# Patient Record
Sex: MALE | Race: WHITE | NOT HISPANIC OR LATINO | Employment: OTHER | URBAN - METROPOLITAN AREA
[De-identification: names, ages, dates, MRNs, and addresses within clinical notes are randomized per-mention and may not be internally consistent; named-entity substitution may affect disease eponyms.]

---

## 2018-02-15 ENCOUNTER — OFFICE VISIT (OUTPATIENT)
Dept: UROLOGY | Facility: CLINIC | Age: 38
End: 2018-02-15
Payer: MEDICARE

## 2018-02-15 VITALS
WEIGHT: 170.19 LBS | BODY MASS INDEX: 25.79 KG/M2 | DIASTOLIC BLOOD PRESSURE: 75 MMHG | SYSTOLIC BLOOD PRESSURE: 109 MMHG | HEIGHT: 68 IN | HEART RATE: 91 BPM

## 2018-02-15 DIAGNOSIS — R39.89 BLADDER PAIN: Primary | ICD-10-CM

## 2018-02-15 DIAGNOSIS — R30.0 DYSURIA: ICD-10-CM

## 2018-02-15 LAB
BILIRUB SERPL-MCNC: ABNORMAL MG/DL
BLOOD URINE, POC: ABNORMAL
COLOR, POC UA: ABNORMAL
GLUCOSE UR QL STRIP: 500
KETONES UR QL STRIP: 15
LEUKOCYTE ESTERASE URINE, POC: ABNORMAL
NITRITE, POC UA: ABNORMAL
PH, POC UA: 5
PROTEIN, POC: 300
SPECIFIC GRAVITY, POC UA: 1
UROBILINOGEN, POC UA: 8

## 2018-02-15 PROCEDURE — 99204 OFFICE O/P NEW MOD 45 MIN: CPT | Mod: S$PBB,,, | Performed by: UROLOGY

## 2018-02-15 PROCEDURE — 87086 URINE CULTURE/COLONY COUNT: CPT

## 2018-02-15 PROCEDURE — 99214 OFFICE O/P EST MOD 30 MIN: CPT | Mod: PBBFAC,PO | Performed by: UROLOGY

## 2018-02-15 PROCEDURE — 81002 URINALYSIS NONAUTO W/O SCOPE: CPT | Mod: PBBFAC,PO | Performed by: UROLOGY

## 2018-02-15 PROCEDURE — 99999 PR PBB SHADOW E&M-EST. PATIENT-LVL IV: CPT | Mod: PBBFAC,,, | Performed by: UROLOGY

## 2018-02-15 RX ORDER — OLANZAPINE 15 MG/1
15 TABLET ORAL NIGHTLY
Status: ON HOLD | COMMUNITY
Start: 2018-01-30 | End: 2018-12-12

## 2018-02-15 RX ORDER — CYCLOBENZAPRINE HCL 10 MG
10 TABLET ORAL 2 TIMES DAILY
COMMUNITY
Start: 2017-12-02 | End: 2020-08-01 | Stop reason: CLARIF

## 2018-02-15 RX ORDER — GABAPENTIN 600 MG/1
900 TABLET ORAL 3 TIMES DAILY
Status: ON HOLD | COMMUNITY
Start: 2015-06-30 | End: 2018-12-12 | Stop reason: CLARIF

## 2018-02-15 RX ORDER — DOCUSATE SODIUM AND SENNOSIDES 8.6; 5 MG/1; MG/1
TABLET ORAL
Refills: 0 | Status: ON HOLD | COMMUNITY
Start: 2018-01-30 | End: 2018-12-12

## 2018-02-15 RX ORDER — DOXYCYCLINE HYCLATE 100 MG
TABLET ORAL
COMMUNITY
Start: 2018-02-06 | End: 2018-03-02

## 2018-02-15 RX ORDER — OXCARBAZEPINE 600 MG/1
300 TABLET, FILM COATED ORAL 2 TIMES DAILY
Status: ON HOLD | COMMUNITY
Start: 2018-01-30 | End: 2018-12-16 | Stop reason: SDUPTHER

## 2018-02-15 RX ORDER — SERTRALINE HYDROCHLORIDE 100 MG/1
100 TABLET, FILM COATED ORAL NIGHTLY
Status: ON HOLD | COMMUNITY
Start: 2018-01-30 | End: 2018-12-12

## 2018-02-15 RX ORDER — IBUPROFEN 400 MG/1
400 TABLET ORAL 2 TIMES DAILY PRN
Status: ON HOLD | COMMUNITY
Start: 2018-01-30 | End: 2018-12-12 | Stop reason: CLARIF

## 2018-02-15 RX ORDER — ACYCLOVIR 400 MG/1
400 TABLET ORAL 3 TIMES DAILY
Status: ON HOLD | COMMUNITY
Start: 2018-01-30 | End: 2018-12-16 | Stop reason: HOSPADM

## 2018-02-15 RX ORDER — PROPRANOLOL HYDROCHLORIDE 10 MG/1
10 TABLET ORAL DAILY
Status: ON HOLD | COMMUNITY
Start: 2018-01-30 | End: 2018-12-12

## 2018-02-15 RX ORDER — NAPROXEN 500 MG/1
500 TABLET ORAL DAILY
Status: ON HOLD | COMMUNITY
Start: 2018-01-29 | End: 2018-12-12

## 2018-02-15 RX ORDER — AMITRIPTYLINE HYDROCHLORIDE 50 MG/1
100 TABLET, FILM COATED ORAL NIGHTLY PRN
Status: ON HOLD | COMMUNITY
End: 2018-12-12 | Stop reason: CLARIF

## 2018-02-15 RX ORDER — MULTIVIT-MINS/IRON/FOLIC/LYCOP 8-200-600
TABLET ORAL
Refills: 0 | Status: ON HOLD | COMMUNITY
Start: 2018-01-30 | End: 2018-12-12 | Stop reason: CLARIF

## 2018-02-15 RX ORDER — BUPROPION HYDROCHLORIDE 300 MG/1
450 TABLET ORAL DAILY
Status: ON HOLD | COMMUNITY
Start: 2018-01-30 | End: 2018-12-16 | Stop reason: SDUPTHER

## 2018-02-15 RX ORDER — IRON,CARB/VIT C/VIT B12/FOLIC 100-250-1
TABLET ORAL
Refills: 0 | Status: ON HOLD | COMMUNITY
Start: 2018-01-31 | End: 2018-12-12 | Stop reason: CLARIF

## 2018-02-15 RX ORDER — DULOXETIN HYDROCHLORIDE 30 MG/1
30 CAPSULE, DELAYED RELEASE ORAL DAILY
Status: ON HOLD | COMMUNITY
Start: 2018-01-30 | End: 2018-12-12

## 2018-02-15 NOTE — PROGRESS NOTES
"Subjective:       Patient ID: Ashwin Madrid is a 37 y.o. male.    Chief Complaint: Advice Only (bladder pain)    HPI     37 year old with "bladder pain"  This has been an ongoing problem  He was previously treated for an infection with a course of Cipro.  He was seeing a a Urologist in [place] but has relocated.  Cystoscopy apparently was normal.  He says there is pain at the base of his penis and radiates through the length of the penis.  He was seen in the ER and is now taking Doxycyline.  Seen in ER [facility].  He also complains of painful urination.  He is taking AZO which he feels is helpful.  He denies gross hematuria.   GC and chlamydia cultures are neg.  AUA symptom score is 13.     UA on AZO,  Urine  Micro exam: 0-2 RBC's per HPF.      Past Medical History:   Diagnosis Date    ADHD (attention deficit hyperactivity disorder) 2/7/2012    Anxiety     Bipolar 1 disorder 2/7/2012    Depression      History reviewed. No pertinent surgical history.      Current Outpatient Prescriptions:     acyclovir (ZOVIRAX) 400 MG tablet, , Disp: , Rfl:     amitriptyline (ELAVIL) 50 MG tablet, Take 50 mg by mouth., Disp: , Rfl:     buPROPion (WELLBUTRIN XL) 300 MG 24 hr tablet, , Disp: , Rfl:     CENTRUM MEN 8 mg iron- 200 mcg-600 mcg Tab, TK 1 T PO D, Disp: , Rfl: 0    cyclobenzaprine (FLEXERIL) 10 MG tablet, , Disp: , Rfl:     doxycycline (VIBRA-TABS) 100 MG tablet, , Disp: , Rfl:     DULoxetine (CYMBALTA) 30 MG capsule, , Disp: , Rfl:     FE C PLUS Tab, TK 1 T PO  D, Disp: , Rfl: 0    gabapentin (NEURONTIN) 600 MG tablet, Take by mouth., Disp: , Rfl:     ibuprofen (ADVIL,MOTRIN) 400 MG tablet, , Disp: , Rfl:     naproxen (NAPROSYN) 500 MG tablet, , Disp: , Rfl:     OLANZapine (ZYPREXA) 15 MG Tab, , Disp: , Rfl:     OXcarbazepine (TRILEPTAL) 600 MG Tab, , Disp: , Rfl:     propranolol (INDERAL) 10 MG tablet, , Disp: , Rfl:     SENNA-S 8.6-50 mg per tablet, TK 1 T PO QD, Disp: , Rfl: 0    sertraline " (ZOLOFT) 100 MG tablet, , Disp: , Rfl:       Review of Systems   Constitutional: Negative for fever.   Eyes: Negative for visual disturbance.   Respiratory: Negative for shortness of breath.    Cardiovascular: Negative for chest pain.   Gastrointestinal: Negative for nausea.   Genitourinary: Positive for dysuria. Negative for hematuria.   Musculoskeletal: Negative for gait problem.   Skin: Negative for rash.   Neurological: Negative for seizures.   Psychiatric/Behavioral: Negative for confusion.       Objective:      Physical Exam   Constitutional: He is oriented to person, place, and time. He appears well-developed and well-nourished.   HENT:   Head: Normocephalic and atraumatic.   Eyes: Conjunctivae are normal.   Cardiovascular: Normal rate.    Pulmonary/Chest: Effort normal.   Abdominal: Hernia confirmed negative in the right inguinal area and confirmed negative in the left inguinal area.   Genitourinary: Testes normal and penis normal.         Musculoskeletal: Normal range of motion.   Neurological: He is alert and oriented to person, place, and time.   Skin: Skin is warm and dry. No rash noted.   Psychiatric: He has a normal mood and affect.   Vitals reviewed.      Assessment:       1. Bladder pain    2. Dysuria        Plan:       Bladder pain  -     POCT URINE DIPSTICK WITHOUT MICROSCOPE  -     US Retroperitoneal Complete (Kidney and; Future; Expected date: 02/15/2018    Dysuria  -     Urine culture      Plan Renal US.  Continue Doxycyline.  F/u urine culture.

## 2018-02-17 LAB — BACTERIA UR CULT: NO GROWTH

## 2018-02-19 ENCOUNTER — HOSPITAL ENCOUNTER (OUTPATIENT)
Dept: RADIOLOGY | Facility: HOSPITAL | Age: 38
Discharge: HOME | End: 2018-02-19
Attending: UROLOGY
Payer: MEDICARE

## 2018-02-19 DIAGNOSIS — R39.89 BLADDER PAIN: ICD-10-CM

## 2018-02-19 PROCEDURE — 76770 US EXAM ABDO BACK WALL COMP: CPT | Mod: 26,,, | Performed by: RADIOLOGY

## 2018-02-19 PROCEDURE — 76770 US EXAM ABDO BACK WALL COMP: CPT | Mod: TC,PO

## 2018-03-02 ENCOUNTER — OFFICE VISIT (OUTPATIENT)
Dept: UROLOGY | Facility: CLINIC | Age: 38
End: 2018-03-02
Payer: MEDICARE

## 2018-03-02 VITALS
BODY MASS INDEX: 24.89 KG/M2 | DIASTOLIC BLOOD PRESSURE: 69 MMHG | HEIGHT: 68 IN | HEART RATE: 80 BPM | SYSTOLIC BLOOD PRESSURE: 113 MMHG | WEIGHT: 164.19 LBS

## 2018-03-02 DIAGNOSIS — R39.89 BLADDER PAIN: Primary | ICD-10-CM

## 2018-03-02 DIAGNOSIS — R30.0 DYSURIA: ICD-10-CM

## 2018-03-02 LAB
BILIRUB SERPL-MCNC: NORMAL MG/DL
BLOOD URINE, POC: NORMAL
COLOR, POC UA: NORMAL
GLUCOSE UR QL STRIP: NORMAL
KETONES UR QL STRIP: NORMAL
LEUKOCYTE ESTERASE URINE, POC: NORMAL
NITRITE, POC UA: POSITIVE
PH, POC UA: 6
PROTEIN, POC: NORMAL
SPECIFIC GRAVITY, POC UA: 1.02
UROBILINOGEN, POC UA: 0.2

## 2018-03-02 PROCEDURE — 81002 URINALYSIS NONAUTO W/O SCOPE: CPT | Mod: PBBFAC,PO | Performed by: UROLOGY

## 2018-03-02 PROCEDURE — 99999 PR PBB SHADOW E&M-EST. PATIENT-LVL III: CPT | Mod: PBBFAC,,, | Performed by: UROLOGY

## 2018-03-02 PROCEDURE — 99213 OFFICE O/P EST LOW 20 MIN: CPT | Mod: S$PBB,,, | Performed by: UROLOGY

## 2018-03-02 PROCEDURE — 99213 OFFICE O/P EST LOW 20 MIN: CPT | Mod: PBBFAC,PO | Performed by: UROLOGY

## 2018-03-02 NOTE — PROGRESS NOTES
Subjective:       Patient ID: Ashwin Madrid is a 37 y.o. male.    Chief Complaint: Bladder Pain    HPI     He completed the antibiotics and still has persistent pain which is constant.  The pain is worse in the morning.  Sometime taking a bath relieves the pain briefly.  More painful when he voids.  No change with cough and sneeze.  Urine culture was negative.  Renal US noted a right renal cyst with septation.  UA today reviewed.  Nitrate positive but he has been taking AZO.  Micro noted crystals and micro blood.    Review of Systems   Constitutional: Negative for fever.   Genitourinary: Positive for dysuria. Negative for hematuria.       Objective:      Physical Exam   Constitutional: He is oriented to person, place, and time. He appears well-developed and well-nourished.   HENT:   Head: Normocephalic and atraumatic.   Eyes: Conjunctivae are normal.   Cardiovascular: Normal rate.    Pulmonary/Chest: Effort normal.   Abdominal: Hernia confirmed negative in the right inguinal area and confirmed negative in the left inguinal area.   Genitourinary: Testes normal and penis normal. Rectal exam shows no mass and anal tone normal. Prostate is enlarged. Prostate is not tender.   Musculoskeletal: Normal range of motion.   Neurological: He is alert and oriented to person, place, and time.   Skin: Skin is warm and dry. No rash noted.   Psychiatric: He has a normal mood and affect.   Vitals reviewed.      Assessment:       1. Bladder pain    2. Dysuria        Plan:       Bladder pain  -     POCT URINE DIPSTICK WITHOUT MICROSCOPE  -     Cystoscopy; Future    Dysuria      f/u for cystoscopy.

## 2018-11-14 ENCOUNTER — HOSPITAL ENCOUNTER (EMERGENCY)
Facility: HOSPITAL | Age: 38
Discharge: HOME | End: 2018-11-14
Attending: EMERGENCY MEDICINE
Payer: MEDICARE

## 2018-11-14 VITALS
RESPIRATION RATE: 18 BRPM | DIASTOLIC BLOOD PRESSURE: 90 MMHG | HEART RATE: 95 BPM | OXYGEN SATURATION: 99 % | SYSTOLIC BLOOD PRESSURE: 130 MMHG | TEMPERATURE: 99 F

## 2018-11-14 DIAGNOSIS — N50.819 TESTICULAR PAIN: Primary | ICD-10-CM

## 2018-11-14 DIAGNOSIS — Z87.440 HISTORY OF UTI: ICD-10-CM

## 2018-11-14 LAB
BILIRUB UR QL STRIP: NEGATIVE
BILIRUB UR QL STRIP: NEGATIVE
CLARITY UR: CLEAR
CLARITY UR: CLEAR
COLOR UR: YELLOW
COLOR UR: YELLOW
GLUCOSE UR QL STRIP: NEGATIVE
GLUCOSE UR QL STRIP: NEGATIVE
HGB UR QL STRIP: NEGATIVE
HGB UR QL STRIP: NEGATIVE
KETONES UR QL STRIP: ABNORMAL
KETONES UR QL STRIP: ABNORMAL
LEUKOCYTE ESTERASE UR QL STRIP: NEGATIVE
LEUKOCYTE ESTERASE UR QL STRIP: NEGATIVE
NITRITE UR QL STRIP: NEGATIVE
NITRITE UR QL STRIP: NEGATIVE
PH UR STRIP: 7 [PH] (ref 5–8)
PH UR STRIP: 7 [PH] (ref 5–8)
PROT UR QL STRIP: NEGATIVE
PROT UR QL STRIP: NEGATIVE
SP GR UR STRIP: 1.01 (ref 1–1.03)
SP GR UR STRIP: 1.01 (ref 1–1.03)
URN SPEC COLLECT METH UR: ABNORMAL
URN SPEC COLLECT METH UR: ABNORMAL
UROBILINOGEN UR STRIP-ACNC: NEGATIVE EU/DL
UROBILINOGEN UR STRIP-ACNC: NEGATIVE EU/DL

## 2018-11-14 PROCEDURE — 81003 URINALYSIS AUTO W/O SCOPE: CPT

## 2018-11-14 PROCEDURE — 99284 EMERGENCY DEPT VISIT MOD MDM: CPT | Mod: 25

## 2018-11-14 RX ORDER — IBUPROFEN 600 MG/1
600 TABLET ORAL EVERY 6 HOURS PRN
Qty: 20 TABLET | Refills: 0 | Status: ON HOLD | OUTPATIENT
Start: 2018-11-14 | End: 2018-12-12

## 2018-11-14 RX ORDER — HYDROCODONE BITARTRATE AND ACETAMINOPHEN 10; 325 MG/1; MG/1
1 TABLET ORAL
Status: DISCONTINUED | OUTPATIENT
Start: 2018-11-14 | End: 2018-11-14

## 2018-11-14 NOTE — ED PROVIDER NOTES
SCRIBE #1 NOTE: I, Solange Wright, am scribing for, and in the presence of, Luke Harper NP . I have scribed the entire note.       History     Chief Complaint   Patient presents with    Testicle Pain     recently seen for same. [place] x3h. Received 80mcg fentanyl en route     Review of patient's allergies indicates:  No Known Allergies      History of Present Illness     HPI    11/14/2018, 5:47 PM  History obtained from the patient      History of Present Illness: Ashwin Madrid is a 38 y.o. male patient with a PMHx including anxiety who presents to the Emergency Department for evaluation of testicular pain which onset suddenly today. Pt states his testicle went up to his stomach. Symptoms are constant and moderate in severity. Pt was worked up at [facility] on 11/11/18 with negative chlamydia test, nitrite positive urine, and elevated WBC count of 148[address]. Pt's CT showed nonspecific mild thickening of the wall of the urinary bladder, raising the possibility of mild cystitis and herniation of a small amount fat along the right inguinal canal.  Pt states he did not get any prescriptions filled.  No mitigating or exacerbating factors reported. Associated sxs include nausea and emesis. Patient denies any dysuria, hematuria, penile pain/discharge, scrotal swelling, abd pain, diarrhea, fever, chills, and all other sxs at this time. Prior Tx includes 80 mcg fentanyl en route via EMS. No further complaints or concerns at this time.         Arrival mode:  EMS      PCP: Primary Doctor No        Past Medical History:  Past Medical History:   Diagnosis Date    ADHD (attention deficit hyperactivity disorder) 2/7/2012    Anxiety     Bipolar 1 disorder 2/7/2012    Depression        Past Surgical History:  Past Surgical History:   Procedure Laterality Date    EGD (ESOPHAGOGASTRODUODENOSCOPY) N/A 9/14/2012    Performed by Juancarlos So Jr., MD at [facility]         Family History:  Family History   Problem Relation Age of Onset     ADD / ADHD Neg Hx     Alcohol abuse Neg Hx     Anxiety disorder Neg Hx     Bipolar disorder Neg Hx     Dementia Neg Hx     Depression Neg Hx     Drug abuse Neg Hx     OCD Neg Hx     Paranoid behavior Neg Hx     Physical abuse Neg Hx     Schizophrenia Neg Hx     Seizures Neg Hx     Self injury Neg Hx     Sexual abuse Neg Hx     Suicide Neg Hx        Social History:  Social History     Tobacco Use    Smoking status: Current Every Day Smoker     Packs/day: 1.00     Years: 10.00     Pack years: 10.00     Types: Cigarettes    Smokeless tobacco: Never Used   Substance and Sexual Activity    Alcohol use: No    Drug use: No     Comment: history of MJ at age 17, pain pills, mushrooms, Sober for last 1.5 years    Sexual activity: Yes     Partners: Female        Review of Systems     Review of Systems   Constitutional: Negative for chills and fever.   HENT: Negative for congestion and sore throat.    Respiratory: Negative for cough and shortness of breath.    Cardiovascular: Negative for chest pain and leg swelling.   Gastrointestinal: Positive for nausea and vomiting. Negative for abdominal pain, blood in stool, constipation and diarrhea.   Genitourinary: Positive for testicular pain. Negative for discharge, dysuria, flank pain, hematuria, penile pain, penile swelling and scrotal swelling.   Musculoskeletal: Negative for back pain and myalgias.   Skin: Negative for rash and wound.   Neurological: Negative for dizziness, light-headedness and headaches.   Psychiatric/Behavioral: Negative for agitation and confusion.   All other systems reviewed and are negative.       Physical Exam     Initial Vitals [11/14/18 1631]   BP Pulse Resp Temp SpO2   (!) 130/90 95 18 98.8 °F (37.1 °C) 99 %      MAP       --          Physical Exam  Nursing Notes and Vital Signs Reviewed.  Constitutional: Patient is in no apparent distress. Well-developed and well-nourished.  Head: Atraumatic. Normocephalic.  Eyes: PERRL. EOM  intact. Conjunctivae are not pale. No scleral icterus.  ENT: Mucous membranes are moist. Oropharynx is clear and symmetric.    Neck: Supple. Full ROM. No lymphadenopathy.  Cardiovascular: Regular rate. Regular rhythm. No murmurs, rubs, or gallops. Distal pulses are 2+ and symmetric.  Pulmonary/Chest: No respiratory distress. Clear to auscultation bilaterally. No wheezing or rales.  Abdominal: Soft and non-distended.  There is no tenderness.  No rebound, guarding, or rigidity. Good bowel sounds.  Genitourinary: No CVA tenderness  : External inspection is normal.   No erythema, rash, or lesions. No penile discharge. Normal bilateral testicular lie and position. Scrotum and testes appear normal with no discoloration. No scrotal, testicular, or epididymal tenderness. No masses or hernias around the scrotum, testicles, or inguinal canal.  Musculoskeletal: Moves all extremities. No obvious deformities. No edema. No calf tenderness.  Skin: Warm and dry.  Neurological:  Alert, awake, and appropriate.  Normal speech.  No acute focal neurological deficits are appreciated.  Psychiatric: Normal affect. Good eye contact. Appropriate in content.     ED Course   Procedures  ED Vital Signs:  Vitals:    11/14/18 1631   BP: (!) 130/90   Pulse: 95   Resp: 18   Temp: 98.8 °F (37.1 °C)   TempSrc: Oral   SpO2: 99%       Abnormal Lab Results:  Labs Reviewed   URINALYSIS - Abnormal; Notable for the following components:       Result Value    Ketones, UA 1+ (*)     All other components within normal limits    Narrative:     Urine Macroscopic was cancelled on 11/14/2018 at 17:14 by SEG;   absorbed by other test UAR   URINALYSIS, REFLEX TO URINE CULTURE - Abnormal; Notable for the following components:    Ketones, UA 1+ (*)     All other components within normal limits   URINALYSIS, REFLEX TO URINE CULTURE        All Lab Results:  Results for orders placed or performed during the hospital encounter of 11/14/18   Urinalysis   Result Value  Ref Range    Specimen UA Urine, Clean Catch     Color, UA Yellow Yellow, Straw, Aniya    Appearance, UA Clear Clear    pH, UA 7.0 5.0 - 8.0    Specific Gravity, UA 1.010 1.005 - 1.030    Protein, UA Negative Negative    Glucose, UA Negative Negative    Ketones, UA 1+ (A) Negative    Bilirubin (UA) Negative Negative    Occult Blood UA Negative Negative    Nitrite, UA Negative Negative    Urobilinogen, UA Negative <2.0 EU/dL    Leukocytes, UA Negative Negative   Urinalysis, Reflex to Urine Culture   Result Value Ref Range    Specimen UA Urine, Clean Catch     Color, UA Yellow Yellow, Straw, Aniya    Appearance, UA Clear Clear    pH, UA 7.0 5.0 - 8.0    Specific Gravity, UA 1.010 1.005 - 1.030    Protein, UA Negative Negative    Glucose, UA Negative Negative    Ketones, UA 1+ (A) Negative    Bilirubin (UA) Negative Negative    Occult Blood UA Negative Negative    Nitrite, UA Negative Negative    Urobilinogen, UA Negative <2.0 EU/dL    Leukocytes, UA Negative Negative         Imaging Results:  Imaging Results          US Scrotum And Testicles (Final result)  Result time 11/14/18 18:46:43    Final result by Winston Dillard MD (11/14/18 18:46:43)                 Impression:      Normal testicular ultrasound.  No evidence for testicular torsion or mass.      Electronically signed by: Winston Dillard MD  Date:    11/14/2018  Time:    18:46             Narrative:    EXAMINATION:  US SCROTUM AND TESTICLES    CLINICAL HISTORY:  Testicular pain, unspecified    TECHNIQUE:  Sonography of the scrotum and testes.    COMPARISON:  None.    FINDINGS:  Right Testicle:    Size: 4.2 x 3.0 x 2.4 cm    Appearance: Normal    Flow: Normal arterial and venous flow    Epididymis: Normal    Hydrocele: None    Varicocele: None    Left Testicle:    Size: 4.6 x 2.6 x 2.5 cm    Appearance: Normal    Flow: Normal arterial and venous flow    Epididymis: Normal    Hydrocele: None    Varicocele: None                                      The Emergency  Provider reviewed the vital signs and test results, which are outlined above.     ED Discussion     6:54 PM: Discussed with pt all pertinent ED information and results. Discussed pt dx of and plan of tx. Advised pt to f/u with PCP in one week. Gave pt all f/u and return to the ED instructions. All questions and concerns were addressed at this time. Pt expresses understanding of information and instructions, and is comfortable with plan to discharge. Pt is stable for discharge.      I discussed with patient and/or family/caretaker that evaluation in the ED does not suggest any emergent or life threatening medical conditions requiring immediate intervention beyond what was provided in the ED, and I believe patient is safe for discharge.  Regardless, an unremarkable evaluation in the ED does not preclude the development or presence of a serious of life threatening condition. As such, patient was instructed to return immediately for any worsening or change in current symptoms.      ED Medication(s):  Medications   HYDROcodone-acetaminophen  mg per tablet 1 tablet (not administered)              Current Discharge Medication List      START taking these medications    Details   !! ibuprofen (ADVIL,MOTRIN) 600 MG tablet Take 1 tablet (600 mg total) by mouth every 6 (six) hours as needed for Pain.  Qty: 20 tablet, Refills: 0                  Follow-up Information     Primary doctor In 1 week.                        Medical Decision Making     Medical Decision Making:   Clinical Tests:   Lab Tests: Ordered and Reviewed  Radiological Study: Ordered and Reviewed             Scribe Attestation:   Scribe #1: I performed the above scribed service and the documentation accurately describes the services I performed. I attest to the accuracy of the note. 11/14/2018 5:47 PM    Attending:   Physician Attestation Statement for Scribe #1: I, Luke Harper NP, personally performed the services described in this documentation,  as scribed by Solange Wright, in my presence, and it is both accurate and complete.           Clinical Impression       ICD-10-CM ICD-9-CM   1. Testicular pain N50.819 608.9   2. History of UTI Z87.440 V13.02       Disposition:   Disposition: Discharged  Condition: Stable               Luke Harper Jr., [facility]  11/14/18 1958

## 2018-11-15 NOTE — ED NOTES
"Pt came by ambulance and states he will get a cab voucher to go home. Pt also requested a sandwich. Informed pt of NPO status. Luke MENG made aware of pt having "no ride home" Told to hold the hydrocodone  "

## 2018-12-08 PROBLEM — J18.9 PNEUMONIA: Status: ACTIVE | Noted: 2018-12-08

## 2018-12-09 ENCOUNTER — HOSPITAL ENCOUNTER (INPATIENT)
Facility: HOSPITAL | Age: 38
LOS: 7 days | Discharge: HOME | DRG: 208 | End: 2018-12-16
Attending: INTERNAL MEDICINE | Admitting: INTERNAL MEDICINE
Payer: MEDICARE

## 2018-12-09 ENCOUNTER — HOSPITAL ENCOUNTER (OUTPATIENT)
Dept: RADIOLOGY | Facility: HOSPITAL | Age: 38
Discharge: HOME | DRG: 208 | End: 2018-12-09
Attending: INTERNAL MEDICINE
Payer: MEDICARE

## 2018-12-09 DIAGNOSIS — K75.9 HEPATITIS: ICD-10-CM

## 2018-12-09 DIAGNOSIS — J80 ARDS (ADULT RESPIRATORY DISTRESS SYNDROME): ICD-10-CM

## 2018-12-09 DIAGNOSIS — J18.9 PNEUMONIA: ICD-10-CM

## 2018-12-09 DIAGNOSIS — R94.31 QT PROLONGATION: ICD-10-CM

## 2018-12-09 PROBLEM — E87.20 METABOLIC ACIDOSIS: Status: ACTIVE | Noted: 2018-12-09

## 2018-12-09 PROBLEM — F51.01 PRIMARY INSOMNIA: Status: ACTIVE | Noted: 2018-12-09

## 2018-12-09 PROBLEM — R74.01 TRANSAMINITIS: Status: ACTIVE | Noted: 2018-12-09

## 2018-12-09 PROBLEM — E78.1 HYPERTRIGLYCERIDEMIA: Status: ACTIVE | Noted: 2018-12-09

## 2018-12-09 PROBLEM — E87.29 RESPIRATORY ACIDOSIS: Status: ACTIVE | Noted: 2018-12-09

## 2018-12-09 PROBLEM — G44.009 CLUSTER HEADACHES: Status: ACTIVE | Noted: 2018-12-09

## 2018-12-09 PROBLEM — F41.9 ANXIETY: Status: ACTIVE | Noted: 2018-12-09

## 2018-12-09 LAB
ALBUMIN SERPL BCP-MCNC: 2.2 G/DL
ALLENS TEST: ABNORMAL
ALP SERPL-CCNC: 64 U/L
ALT SERPL W/O P-5'-P-CCNC: 59 U/L
ANION GAP SERPL CALC-SCNC: 9 MMOL/L
AST SERPL-CCNC: 159 U/L
BASOPHILS # BLD AUTO: 0.01 K/UL
BASOPHILS NFR BLD: 0.1 %
BILIRUB SERPL-MCNC: 0.3 MG/DL
BUN SERPL-MCNC: 14 MG/DL
CALCIUM SERPL-MCNC: 7.9 MG/DL
CHLORIDE SERPL-SCNC: 107 MMOL/L
CHOLEST SERPL-MCNC: 127 MG/DL
CHOLEST/HDLC SERPL: 9.1 {RATIO}
CO2 SERPL-SCNC: 21 MMOL/L
CREAT SERPL-MCNC: 0.7 MG/DL
DELSYS: ABNORMAL
DIFFERENTIAL METHOD: ABNORMAL
EOSINOPHIL # BLD AUTO: 0 K/UL
EOSINOPHIL NFR BLD: 0.1 %
ERYTHROCYTE [DISTWIDTH] IN BLOOD BY AUTOMATED COUNT: 12.7 %
ERYTHROCYTE [SEDIMENTATION RATE] IN BLOOD BY WESTERGREN METHOD: 30 MM/H
ERYTHROCYTE [SEDIMENTATION RATE] IN BLOOD BY WESTERGREN METHOD: 32 MM/H
EST. GFR  (AFRICAN AMERICAN): >60 ML/MIN/1.73 M^2
EST. GFR  (NON AFRICAN AMERICAN): >60 ML/MIN/1.73 M^2
ESTIMATED AVG GLUCOSE: 80 MG/DL
ETHANOL SERPL-MCNC: <10 MG/DL
FIO2: 100
FIO2: 35
FIO2: 40
FIO2: 45
GLUCOSE SERPL-MCNC: 141 MG/DL
HBA1C MFR BLD HPLC: 4.4 %
HCO3 UR-SCNC: 23 MMOL/L (ref 24–28)
HCO3 UR-SCNC: 24.7 MMOL/L (ref 24–28)
HCO3 UR-SCNC: 24.9 MMOL/L (ref 24–28)
HCO3 UR-SCNC: 27.1 MMOL/L (ref 24–28)
HCO3 UR-SCNC: 27.7 MMOL/L (ref 24–28)
HCO3 UR-SCNC: 28.1 MMOL/L (ref 24–28)
HCT VFR BLD AUTO: 30.6 %
HDLC SERPL-MCNC: 14 MG/DL
HDLC SERPL: 11 %
HGB BLD-MCNC: 10 G/DL
HIV1+2 IGG SERPL QL IA.RAPID: NEGATIVE
HYPOCHROMIA BLD QL SMEAR: ABNORMAL
IMM GRANULOCYTES # BLD AUTO: 0.27 K/UL
IMM GRANULOCYTES NFR BLD AUTO: 1.8 %
INR PPP: 1
LACTATE SERPL-SCNC: 0.8 MMOL/L
LDLC SERPL CALC-MCNC: ABNORMAL MG/DL
LIPASE SERPL-CCNC: 4 U/L
LYMPHOCYTES # BLD AUTO: 0.3 K/UL
LYMPHOCYTES NFR BLD: 1.8 %
MAGNESIUM SERPL-MCNC: 2.2 MG/DL
MCH RBC QN AUTO: 31.7 PG
MCHC RBC AUTO-ENTMCNC: 32.7 G/DL
MCV RBC AUTO: 97 FL
MIN VOL: 11.3
MIN VOL: 11.4
MIN VOL: 11.9
MODE: ABNORMAL
MONOCYTES # BLD AUTO: 0.5 K/UL
MONOCYTES NFR BLD: 3.1 %
NEUTROPHILS # BLD AUTO: 13.7 K/UL
NEUTROPHILS NFR BLD: 93.1 %
NONHDLC SERPL-MCNC: 113 MG/DL
NRBC BLD-RTO: 0 /100 WBC
OSMOLALITY SERPL: 293 MOSM/KG
PCO2 BLDA: 54.9 MMHG (ref 35–45)
PCO2 BLDA: 58.1 MMHG (ref 35–45)
PCO2 BLDA: 59.1 MMHG (ref 35–45)
PCO2 BLDA: 61.9 MMHG (ref 35–45)
PCO2 BLDA: 61.9 MMHG (ref 35–45)
PCO2 BLDA: 63 MMHG (ref 35–45)
PEEP: 10
PEEP: 15
PH SMN: 7.21 [PH] (ref 7.35–7.45)
PH SMN: 7.23 [PH] (ref 7.35–7.45)
PH SMN: 7.23 [PH] (ref 7.35–7.45)
PH SMN: 7.25 [PH] (ref 7.35–7.45)
PH SMN: 7.26 [PH] (ref 7.35–7.45)
PH SMN: 7.28 [PH] (ref 7.35–7.45)
PHOSPHATE SERPL-MCNC: 1.8 MG/DL
PIP: 29
PIP: 30
PIP: 30
PIP: 31
PIP: 31
PIP: 32
PLATELET # BLD AUTO: 148 K/UL
PLATELET BLD QL SMEAR: ABNORMAL
PMV BLD AUTO: 9.7 FL
PO2 BLDA: 204 MMHG (ref 80–100)
PO2 BLDA: 57 MMHG (ref 80–100)
PO2 BLDA: 68 MMHG (ref 80–100)
PO2 BLDA: 69 MMHG (ref 80–100)
PO2 BLDA: 81 MMHG (ref 80–100)
PO2 BLDA: 97 MMHG (ref 80–100)
POC BE: -3 MMOL/L
POC BE: -3 MMOL/L
POC BE: -4 MMOL/L
POC BE: 0 MMOL/L
POC BE: 1 MMOL/L
POC BE: 1 MMOL/L
POC SATURATED O2: 100 % (ref 95–100)
POC SATURATED O2: 82 % (ref 95–100)
POC SATURATED O2: 89 % (ref 95–100)
POC SATURATED O2: 90 % (ref 95–100)
POC SATURATED O2: 94 % (ref 95–100)
POC SATURATED O2: 96 % (ref 95–100)
POC TCO2: 25 MMOL/L (ref 23–27)
POC TCO2: 26 MMOL/L (ref 23–27)
POC TCO2: 27 MMOL/L (ref 23–27)
POC TCO2: 29 MMOL/L (ref 23–27)
POC TCO2: 30 MMOL/L (ref 23–27)
POC TCO2: 30 MMOL/L (ref 23–27)
POTASSIUM SERPL-SCNC: 4.4 MMOL/L
PROCALCITONIN SERPL IA-MCNC: 5.07 NG/ML
PROT SERPL-MCNC: 6 G/DL
PROTHROMBIN TIME: 10.6 SEC
RBC # BLD AUTO: 3.15 M/UL
SAMPLE: ABNORMAL
SITE: ABNORMAL
SODIUM SERPL-SCNC: 137 MMOL/L
SODIUM UR-SCNC: <20 MMOL/L
SP02: 100
SP02: 92
SP02: 92
SP02: 97
SP02: 97
SP02: 98
T4 FREE SERPL-MCNC: 0.85 NG/DL
TRIGL SERPL-MCNC: 481 MG/DL
TSH SERPL DL<=0.005 MIU/L-ACNC: 0.1 UIU/ML
UUN UR-MCNC: 1038 MG/DL
VANCOMYCIN SERPL-MCNC: 12.7 UG/ML
VANCOMYCIN SERPL-MCNC: 6.7 UG/ML
VT: 380
VT: 400
VT: 400
WBC # BLD AUTO: 14.72 K/UL

## 2018-12-09 PROCEDURE — 87205 SMEAR GRAM STAIN: CPT

## 2018-12-09 PROCEDURE — 99292 CRITICAL CARE ADDL 30 MIN: CPT | Mod: GC,,, | Performed by: INTERNAL MEDICINE

## 2018-12-09 PROCEDURE — 25000003 PHARM REV CODE 250: Performed by: STUDENT IN AN ORGANIZED HEALTH CARE EDUCATION/TRAINING PROGRAM

## 2018-12-09 PROCEDURE — 84300 ASSAY OF URINE SODIUM: CPT

## 2018-12-09 PROCEDURE — 37799 UNLISTED PX VASCULAR SURGERY: CPT

## 2018-12-09 PROCEDURE — 84100 ASSAY OF PHOSPHORUS: CPT

## 2018-12-09 PROCEDURE — 99900035 HC TECH TIME PER 15 MIN (STAT)

## 2018-12-09 PROCEDURE — 94761 N-INVAS EAR/PLS OXIMETRY MLT: CPT

## 2018-12-09 PROCEDURE — 80061 LIPID PANEL: CPT

## 2018-12-09 PROCEDURE — 87040 BLOOD CULTURE FOR BACTERIA: CPT | Mod: 59

## 2018-12-09 PROCEDURE — S0028 INJECTION, FAMOTIDINE, 20 MG: HCPCS | Performed by: STUDENT IN AN ORGANIZED HEALTH CARE EDUCATION/TRAINING PROGRAM

## 2018-12-09 PROCEDURE — 71045 X-RAY EXAM CHEST 1 VIEW: CPT | Mod: TC,FY

## 2018-12-09 PROCEDURE — 84443 ASSAY THYROID STIM HORMONE: CPT

## 2018-12-09 PROCEDURE — 71045 X-RAY EXAM CHEST 1 VIEW: CPT | Mod: 26,,, | Performed by: RADIOLOGY

## 2018-12-09 PROCEDURE — 80202 ASSAY OF VANCOMYCIN: CPT | Mod: 91

## 2018-12-09 PROCEDURE — 80053 COMPREHEN METABOLIC PANEL: CPT

## 2018-12-09 PROCEDURE — 84540 ASSAY OF URINE/UREA-N: CPT

## 2018-12-09 PROCEDURE — 94002 VENT MGMT INPAT INIT DAY: CPT

## 2018-12-09 PROCEDURE — 83605 ASSAY OF LACTIC ACID: CPT

## 2018-12-09 PROCEDURE — 83690 ASSAY OF LIPASE: CPT

## 2018-12-09 PROCEDURE — 63600175 PHARM REV CODE 636 W HCPCS: Performed by: STUDENT IN AN ORGANIZED HEALTH CARE EDUCATION/TRAINING PROGRAM

## 2018-12-09 PROCEDURE — 85610 PROTHROMBIN TIME: CPT

## 2018-12-09 PROCEDURE — 87070 CULTURE OTHR SPECIMN AEROBIC: CPT

## 2018-12-09 PROCEDURE — 83930 ASSAY OF BLOOD OSMOLALITY: CPT

## 2018-12-09 PROCEDURE — 82803 BLOOD GASES ANY COMBINATION: CPT

## 2018-12-09 PROCEDURE — 5A1945Z RESPIRATORY VENTILATION, 24-96 CONSECUTIVE HOURS: ICD-10-PCS | Performed by: INTERNAL MEDICINE

## 2018-12-09 PROCEDURE — 86703 HIV-1/HIV-2 1 RESULT ANTBDY: CPT

## 2018-12-09 PROCEDURE — 80202 ASSAY OF VANCOMYCIN: CPT

## 2018-12-09 PROCEDURE — 99291 CRITICAL CARE FIRST HOUR: CPT | Mod: GC,,, | Performed by: INTERNAL MEDICINE

## 2018-12-09 PROCEDURE — 63600175 PHARM REV CODE 636 W HCPCS

## 2018-12-09 PROCEDURE — 84145 PROCALCITONIN (PCT): CPT

## 2018-12-09 PROCEDURE — 83036 HEMOGLOBIN GLYCOSYLATED A1C: CPT

## 2018-12-09 PROCEDURE — 80320 DRUG SCREEN QUANTALCOHOLS: CPT

## 2018-12-09 PROCEDURE — 85025 COMPLETE CBC W/AUTO DIFF WBC: CPT

## 2018-12-09 PROCEDURE — 84439 ASSAY OF FREE THYROXINE: CPT

## 2018-12-09 PROCEDURE — 36415 COLL VENOUS BLD VENIPUNCTURE: CPT

## 2018-12-09 PROCEDURE — 25000003 PHARM REV CODE 250: Performed by: INTERNAL MEDICINE

## 2018-12-09 PROCEDURE — 99900026 HC AIRWAY MAINTENANCE (STAT)

## 2018-12-09 PROCEDURE — 20000000 HC ICU ROOM

## 2018-12-09 PROCEDURE — 83735 ASSAY OF MAGNESIUM: CPT

## 2018-12-09 RX ORDER — FAMOTIDINE 10 MG/ML
20 INJECTION INTRAVENOUS 2 TIMES DAILY
Status: DISCONTINUED | OUTPATIENT
Start: 2018-12-09 | End: 2018-12-12

## 2018-12-09 RX ORDER — OSELTAMIVIR PHOSPHATE 6 MG/ML
75 FOR SUSPENSION ORAL 2 TIMES DAILY
Status: DISCONTINUED | OUTPATIENT
Start: 2018-12-09 | End: 2018-12-10

## 2018-12-09 RX ORDER — FENTANYL CITRATE-0.9 % NACL/PF 10 MCG/ML
25 PLASTIC BAG, INJECTION (ML) INTRAVENOUS CONTINUOUS
Status: DISCONTINUED | OUTPATIENT
Start: 2018-12-09 | End: 2018-12-09

## 2018-12-09 RX ORDER — FENTANYL CITRATE 50 UG/ML
50 INJECTION, SOLUTION INTRAMUSCULAR; INTRAVENOUS
Status: DISCONTINUED | OUTPATIENT
Start: 2018-12-22 | End: 2018-12-13

## 2018-12-09 RX ORDER — PROPOFOL 10 MG/ML
INJECTION, EMULSION INTRAVENOUS
Status: DISPENSED
Start: 2018-12-09 | End: 2018-12-09

## 2018-12-09 RX ORDER — ENOXAPARIN SODIUM 100 MG/ML
40 INJECTION SUBCUTANEOUS EVERY 24 HOURS
Status: DISCONTINUED | OUTPATIENT
Start: 2018-12-09 | End: 2018-12-16 | Stop reason: HOSPADM

## 2018-12-09 RX ORDER — NOREPINEPHRINE BITARTRATE/D5W 4MG/250ML
0.02 PLASTIC BAG, INJECTION (ML) INTRAVENOUS CONTINUOUS
Status: DISCONTINUED | OUTPATIENT
Start: 2018-12-09 | End: 2018-12-10

## 2018-12-09 RX ORDER — FENTANYL CITRATE-0.9 % NACL/PF 10 MCG/ML
25 PLASTIC BAG, INJECTION (ML) INTRAVENOUS CONTINUOUS
Status: DISCONTINUED | OUTPATIENT
Start: 2018-12-09 | End: 2018-12-12

## 2018-12-09 RX ORDER — HEPARIN SODIUM 5000 [USP'U]/ML
5000 INJECTION, SOLUTION INTRAVENOUS; SUBCUTANEOUS EVERY 8 HOURS
Status: DISCONTINUED | OUTPATIENT
Start: 2018-12-09 | End: 2018-12-09

## 2018-12-09 RX ORDER — METHYLPREDNISOLONE SOD SUCC 125 MG
80 VIAL (EA) INJECTION EVERY 8 HOURS
Status: DISCONTINUED | OUTPATIENT
Start: 2018-12-09 | End: 2018-12-12

## 2018-12-09 RX ORDER — FOLIC ACID 1 MG/1
1 TABLET ORAL DAILY
Status: DISCONTINUED | OUTPATIENT
Start: 2018-12-09 | End: 2018-12-12

## 2018-12-09 RX ORDER — METHYLPREDNISOLONE SOD SUCC 125 MG
125 VIAL (EA) INJECTION EVERY 8 HOURS
Status: DISCONTINUED | OUTPATIENT
Start: 2018-12-09 | End: 2018-12-09

## 2018-12-09 RX ORDER — PROPOFOL 10 MG/ML
5 INJECTION, EMULSION INTRAVENOUS CONTINUOUS
Status: DISCONTINUED | OUTPATIENT
Start: 2018-12-09 | End: 2018-12-09

## 2018-12-09 RX ORDER — DEXMEDETOMIDINE HYDROCHLORIDE 4 UG/ML
0.2 INJECTION, SOLUTION INTRAVENOUS CONTINUOUS
Status: DISCONTINUED | OUTPATIENT
Start: 2018-12-09 | End: 2018-12-09

## 2018-12-09 RX ORDER — CHLORHEXIDINE GLUCONATE ORAL RINSE 1.2 MG/ML
15 SOLUTION DENTAL 2 TIMES DAILY
Status: DISCONTINUED | OUTPATIENT
Start: 2018-12-09 | End: 2018-12-12

## 2018-12-09 RX ORDER — FENTANYL CITRATE 50 UG/ML
50 INJECTION, SOLUTION INTRAMUSCULAR; INTRAVENOUS
Status: DISCONTINUED | OUTPATIENT
Start: 2018-12-09 | End: 2018-12-13

## 2018-12-09 RX ADMIN — PIPERACILLIN AND TAZOBACTAM 4.5 G: 4; .5 INJECTION, POWDER, LYOPHILIZED, FOR SOLUTION INTRAVENOUS; PARENTERAL at 02:12

## 2018-12-09 RX ADMIN — MINERAL OIL AND WHITE PETROLATUM: 150; 830 OINTMENT OPHTHALMIC at 09:12

## 2018-12-09 RX ADMIN — PIPERACILLIN AND TAZOBACTAM 4.5 G: 4; .5 INJECTION, POWDER, LYOPHILIZED, FOR SOLUTION INTRAVENOUS; PARENTERAL at 06:12

## 2018-12-09 RX ADMIN — CISATRACURIUM BESYLATE 5 MCG/KG/MIN: 10 INJECTION INTRAVENOUS at 05:12

## 2018-12-09 RX ADMIN — FAMOTIDINE 20 MG: 10 INJECTION, SOLUTION INTRAVENOUS at 08:12

## 2018-12-09 RX ADMIN — ENOXAPARIN SODIUM 40 MG: 100 INJECTION SUBCUTANEOUS at 05:12

## 2018-12-09 RX ADMIN — Medication 350 MCG/HR: at 06:12

## 2018-12-09 RX ADMIN — CISATRACURIUM BESYLATE 2 MCG/KG/MIN: 10 INJECTION INTRAVENOUS at 05:12

## 2018-12-09 RX ADMIN — HEPARIN SODIUM 5000 UNITS: 5000 INJECTION, SOLUTION INTRAVENOUS; SUBCUTANEOUS at 06:12

## 2018-12-09 RX ADMIN — VANCOMYCIN HYDROCHLORIDE 1250 MG: 10 INJECTION, POWDER, LYOPHILIZED, FOR SOLUTION INTRAVENOUS at 08:12

## 2018-12-09 RX ADMIN — OSELTAMIVIR PHOSPHATE 75 MG: 6 POWDER, FOR SUSPENSION ORAL at 08:12

## 2018-12-09 RX ADMIN — Medication 200 MCG/HR: at 05:12

## 2018-12-09 RX ADMIN — Medication 300 MCG/HR: at 11:12

## 2018-12-09 RX ADMIN — CHLORHEXIDINE GLUCONATE 0.12% ORAL RINSE 15 ML: 1.2 LIQUID ORAL at 08:12

## 2018-12-09 RX ADMIN — PROPOFOL 50 MCG/KG/MIN: 10 INJECTION, EMULSION INTRAVENOUS at 06:12

## 2018-12-09 RX ADMIN — MINERAL OIL AND WHITE PETROLATUM: 150; 830 OINTMENT OPHTHALMIC at 06:12

## 2018-12-09 RX ADMIN — METHYLPREDNISOLONE SODIUM SUCCINATE 80 MG: 125 INJECTION, POWDER, FOR SOLUTION INTRAMUSCULAR; INTRAVENOUS at 02:12

## 2018-12-09 RX ADMIN — AZITHROMYCIN MONOHYDRATE 500 MG: 500 INJECTION, POWDER, LYOPHILIZED, FOR SOLUTION INTRAVENOUS at 09:12

## 2018-12-09 RX ADMIN — THIAMINE HYDROCHLORIDE 500 MG: 100 INJECTION, SOLUTION INTRAMUSCULAR; INTRAVENOUS at 09:12

## 2018-12-09 RX ADMIN — MIDAZOLAM 0.5 MG/HR: 5 INJECTION INTRAMUSCULAR; INTRAVENOUS at 07:12

## 2018-12-09 RX ADMIN — MINERAL OIL AND WHITE PETROLATUM: 150; 830 OINTMENT OPHTHALMIC at 02:12

## 2018-12-09 RX ADMIN — FOLIC ACID 1 MG: 1 TABLET ORAL at 08:12

## 2018-12-09 RX ADMIN — PIPERACILLIN AND TAZOBACTAM 4.5 G: 4; .5 INJECTION, POWDER, LYOPHILIZED, FOR SOLUTION INTRAVENOUS; PARENTERAL at 10:12

## 2018-12-09 RX ADMIN — METHYLPREDNISOLONE SODIUM SUCCINATE 80 MG: 125 INJECTION, POWDER, FOR SOLUTION INTRAMUSCULAR; INTRAVENOUS at 09:12

## 2018-12-09 RX ADMIN — Medication 0.1 MCG/KG/MIN: at 05:12

## 2018-12-09 NOTE — H&P
[facility]  Critical Care Medicine  History & Physical    Patient Name: Ashwin Madrid  MRN: [id number]  Admission Date: 12/9/2018  Hospital Length of Stay: 0 days  Code Status: No Order  Attending Physician: Chester Morton MD   Primary Care Provider: Primary Doctor No   Principal Problem: ARDS (adult respiratory distress syndrome)    Subjective:     HPI:  Mr Madrid is a 37 yo M w PMHx significant for COPD (nil PFT), bipolar disorder, ADHD, polysubstance abuse and GERD who was transferred to [facility] from [facility] d/t concern for ARDS requiring proning.     As pt was intubated on arrival, the majority of the history was collected from transfer paperwork and chart review. Pt presented to OSH with 3-day history of progressively worsening cough, fever and shortness of breath with associated decrease in PO intake and urine output. On admission, he denied IVDU and stated that he had abstained from his drug of choice (methamphetamine). On admission to OSH, he was noted to have leukocytosis with WBC of 21.4, fever of 102.7F, tachycardia of 127, tachypnea of 41 and hypoxemia with SpO2 of 84%. Imaging studies revealed RLL infiltrate concerning for community-acquired pneumonia.     Over the course of his 24 hours at OSH, his supplementary oxygen requirements steadily increased from nasal cannula to nonrebreather to BiPAP to ultimately intubation. Repeat CXR revealed development of bilateral infiltrates concerning for ARDS; FwT3-vf-VbO5 ratio was 54 and he was paralyzed with intermittent rocuronium.     Labs at OSH also revealed a transaminitis with AST of 186 and ALT of 48.     Hospital/ICU Course:  No notes on file     Past Medical History:   Diagnosis Date    ADHD (attention deficit hyperactivity disorder) 2/7/2012    Anxiety     Bipolar 1 disorder 2/7/2012    Depression     GERD (gastroesophageal reflux disease)        Past Surgical History:   Procedure Laterality Date    EGD  (ESOPHAGOGASTRODUODENOSCOPY) N/A 9/14/2012    Performed by Juancarlos So Jr., [facility] at [facility] ENDO    ESOPHAGOGASTRODUODENOSCOPY         Review of patient's allergies indicates:  No Known Allergies    Family History     None        Tobacco Use    Smoking status: Current Every Day Smoker     Packs/day: 1.00     Years: 10.00     Pack years: 10.00     Types: Cigarettes    Smokeless tobacco: Never Used   Substance and Sexual Activity    Alcohol use: No    Drug use: No     Comment: history of MJ at age 17, pain pills, mushrooms, Sober for last 1.5 years    Sexual activity: Yes     Partners: Female      Review of Systems   Unable to perform ROS: Intubated     Objective:     Vital Signs (Most Recent):  Temp: 97.9 °F (36.6 °C) (12/09/18 0515)  Pulse: 104 (12/09/18 0526)  Resp: (!) 32 (12/09/18 0515)  BP: 118/77 (12/09/18 0515)  SpO2: 97 % (12/09/18 0526) Vital Signs (24h Range):  Temp:  [97.9 °F (36.6 °C)-100.3 °F (37.9 °C)] 97.9 °F (36.6 °C)  Pulse:  [] 104  Resp:  [20-32] 32  SpO2:  [90 %-99 %] 97 %  BP: (110-129)/(66-77) 118/77  Arterial Line BP: (113)/(64) 113/64   Weight: 74 kg (163 lb 2.3 oz)  Body mass index is 22.75 kg/m².      Intake/Output Summary (Last 24 hours) at 12/9/2018 0546  Last data filed at 12/9/2018 0500  Gross per 24 hour   Intake --   Output 200 ml   Net -200 ml       Physical Exam   Constitutional: He is oriented to person, place, and time. He appears well-developed and well-nourished. He appears ill. He is sedated and intubated.   HENT:   Head: Normocephalic and atraumatic.   Eyes: Conjunctivae and EOM are normal. Pupils are equal, round, and reactive to light.   Neck: Normal range of motion. Neck supple.   Cardiovascular: Normal rate, regular rhythm, normal heart sounds and intact distal pulses.   No murmur heard.  Pulmonary/Chest: Effort normal and breath sounds normal. He is intubated. No respiratory distress. He has no wheezes.   Abdominal: Soft. Bowel sounds are normal. He exhibits no  distension and no mass.   Lymphadenopathy:     He has no cervical adenopathy.   Neurological: He is oriented to person, place, and time. He is unresponsive.   Skin: Skin is warm and dry.   Vitals reviewed.      Vents:  Vent Mode: A/C (12/09/18 0526)  Ventilator Initiated: Yes (12/09/18 0200)  Set Rate: 32 bmp (12/09/18 0526)  Vt Set: 400 mL (12/09/18 0526)  Pressure Support: 0 cmH20 (12/09/18 0526)  PEEP/CPAP: 10 cmH20 (12/09/18 0526)  Oxygen Concentration (%): 45 (12/09/18 0526)  Peak Airway Pressure: 31 cmH2O (12/09/18 0526)  Plateau Pressure: 27 cmH20 (12/09/18 0526)  Total Ve: 12 mL (12/09/18 0526)  Lines/Drains/Airways     Central Venous Catheter Line                 Percutaneous Central Line Insertion/Assessment - triple lumen  12/09/18 0525 right internal jugular less than 1 day          Drain                 NG/OG Tube 12/09/18 0528 [place] sump 14 Fr. Right mouth less than 1 day         Urethral Catheter 12/09/18 0523 Latex 18 Fr. less than 1 day          Airway                 Airway - Non-Surgical 12/09/18 0530 Endotracheal Tube less than 1 day          Arterial Line                 Arterial Line 12/09/18 0521 Left Radial less than 1 day          Peripheral Intravenous Line                 Peripheral IV - Single Lumen 12/09/18 0522 Left Hand less than 1 day         Peripheral IV - Single Lumen 12/09/18 0523 Left Antecubital less than 1 day         Peripheral IV - Single Lumen 12/09/18 0532 Right Hand less than 1 day              Significant Labs:    CBC/Anemia Profile:  No results for input(s): WBC, HGB, HCT, PLT, MCV, RDW, IRON, FERRITIN, RETIC, FOLATE, [id number], OCCULTBLOOD in the last 48 hours.     Chemistries:  Recent Labs   Lab 12/09/18  0245      K 4.4      CO2 21*   BUN 14   CREATININE 0.7   CALCIUM 7.9*   ALBUMIN 2.2*   PROT 6.0   BILITOT 0.3   ALKPHOS 64   ALT 59*   *   MG 2.2   PHOS 1.8*       All pertinent labs within the past 24 hours have been reviewed.    Significant  Imaging: I have reviewed all pertinent imaging results/findings within the past 24 hours.    Assessment/Plan:     Psychiatric   Anxiety    Home regimen includes propranolol and escitalopram    - Will hold while sedated     Bipolar 1 disorder    Pt's outpatient regimen includes oxcarbazepine 300 mg BID and olanzapine 10 mg QHS.     - Will hold while sedated      Pulmonary   * ARDS (adult respiratory distress syndrome)    PNEUMONIA    Pt presented to OSH with 3 day history of shortness of breath, fever and cough concerning with new RLL opacification noted on CXR. Over the course of first day of admission, respiratory distress worsened until he ultimately required intubation. CXR after intubation revealed bilateral diffuse infiltrates concerning for ARDS and P-to-F ratio of 54. He was transferred to [facility] for possible prone positioning to increase recruitment.     - F/u cultures from OSH  - Sputum cultures; blood cultures x2  - Respiratory viral panel  - Vancomycin 1250 mg IV Q12  - Pip/tazo 4.5 mg IV Q8  - Azithromycin 500 mg daily   - Oseltamivir 75 mg oral suspension per OG tube BID  - IV solumedrol 125 mg Q8 for CAP   - Invasive mechanical ventilation; Vt of 6cc/kg of ideal body weight (roughly 400 cc); high-PEEP, low FiO2 strategy for oxygenation; permissive hypercapnea with goal pH of at least 7.2; initial P-to-F ratio of 200;  - If P:F ratio drops below 150 for at least 12 hours, will initiate prone positioning  - Continue cisatracurium (as P:F is near 150) to decrease transpulmonary pressures and for potential anti-inflammatory benefit  - No role for VV ECMO at this time     Cardiac/Vascular   Hypertriglyceridemia    Pt presents with ARDS and transaminitis consistent with alcoholic etiology. Initial triglycerides of 481 raise concern for possible pancreatitis.     - Lipase, pending  - Abdomen ultrasound complete pending  - Nil propofol for sedation      Renal/   Respiratory acidosis    METABOLIC ACIDOSIS    Pt  presents with ARDS and acute hypercapnic respiratory failure; AB.229 / 59.1 / 204 / 100%; bicarbonate of 21. Pt without diarrhea or other electrolyte abnormalities. Suspect iatrogenic hyperchloremic metabolic acidosis on top of respiratory acidosis.    - Permissive hypercapnia and pH goal >7.2      GI   Transaminitis    AST of 159 and ALT of 59; suggestive of hepatitis d/t alcoholic etiology.     - EtOH serum  - Thiamine 500 mg IV x3 days  - Folate 1 mg IV daily  - Abdominal ultrasound complete  - CIWA-Ar once sedation removed  - Systemic corticosteroids  - Nil pentoxifylline      Other   Primary insomnia    Home regimen includes amitriptyline 10 QHS PRN and melatonin QHS     - Will hold while sedated         Critical Care Daily Checklist:    A: Awake: RASS Goal/Actual Goal:    Actual: Calix Agitation Sedation Scale (RASS): Unarousable   B: Spontaneous Breathing Trial Performed?     C: SAT & SBT Coordinated?  na                      D: Delirium: CAM-ICU     E: Early Mobility Performed? Yes   F: Feeding Goal:    Status:     Current Diet Order   Procedures    Diet NPO      AS: Analgesia/Sedation Fentanyl and versed   T: Thromboembolic Prophylaxis Lovenox 40 QD   H: HOB > 300 Yes   U: Stress Ulcer Prophylaxis (if needed) Famotidine IV 20 mg    G: Glucose Control na   B: Bowel Function     I: Indwelling Catheter (Lines & Neri) Necessity RIJ central line from OSH   D: De-escalation of Antimicrobials/Pharmacotherapies Azithromycin, vancomycin, pip/tazo and oseltamivir    Plan for the day/ETD Follow up Q4 ABG    Code Status:  Family/Goals of Care: Full Code         Critical secondary to Patient has a condition that poses threat to life and bodily function: Severe Respiratory Distress d/t ARDS     Critical care was time spent personally by me on the following activities: development of treatment plan with patient or surrogate and bedside caregivers, discussions with consultants, evaluation of patient's response to  treatment, examination of patient, ordering and performing treatments and interventions, ordering and review of laboratory studies, ordering and review of radiographic studies, pulse oximetry, re-evaluation of patient's condition. This critical care time did not overlap with that of any other provider or involve time for any procedures.     Behram Khan, MD  Critical Care Medicine  [facility]

## 2018-12-09 NOTE — ASSESSMENT & PLAN NOTE
Pt's outpatient regimen includes oxcarbazepine 300 mg BID and olanzapine 10 mg QHS.     - Will hold while sedated

## 2018-12-09 NOTE — ASSESSMENT & PLAN NOTE
PNEUMONIA    Pt presented to OSH with 3 day history of shortness of breath, fever and cough concerning with new RLL opacification noted on CXR. Over the course of first day of admission, respiratory distress worsened until he ultimately required intubation. CXR after intubation revealed bilateral diffuse infiltrates concerning for ARDS and P-to-F ratio of 54. He was transferred to [facility] for possible prone positioning to increase recruitment.     - F/u cultures from OSH  - Sputum cultures; blood cultures x2  - Respiratory viral panel  - Vancomycin 1250 mg IV Q12  - Pip/tazo 4.5 mg IV Q8  - Azithromycin 500 mg daily   - Oseltamivir 75 mg oral suspension per OG tube BID  - IV solumedrol 125 mg Q8 for CAP   - Invasive mechanical ventilation; Vt of 6cc/kg of ideal body weight (roughly 400 cc); high-PEEP, low FiO2 strategy for oxygenation; permissive hypercapnea with goal pH of at least 7.2; initial P-to-F ratio of 200;  - If P:F ratio drops below 150 for at least 12 hours, will initiate prone positioning  - Continue cisatracurium (as P:F is near 150) to decrease transpulmonary pressures and for potential anti-inflammatory benefit  - No role for VV ECMO at this time

## 2018-12-09 NOTE — PLAN OF CARE
Problem: Adult Inpatient Plan of Care  Goal: Plan of Care Review  Outcome: Ongoing (interventions implemented as appropriate)    No acute events throughout day. See vital signs and assessments in flowsheets. See below for updates on today's progress.     Pulmonary: remains intubated in proned position - P/F ratio improved since proning; attempted to wean FiO2 many times - pt very sensitive to decreases: FiO2 now 35% with SpO2 92%    Cardiovascular: remains ST low 100s; 110s assumed during pain; Levo weaned off and maintaining MAPs >65 throughout shift    Neurological: medically paralyzed/sedated with Nimbex, Fentanyl, and Versed; BIS maintained between 40-60 with TOF 0/4 twitches, but not breathing over vent; pupillary reflexes remain present and equal; afebrile    Gastrointestinal: OGT in place with minimal green residuals; no BM this shift    Genitourinary: lagunas in place with UOP 35-185mL/hr    Integumentary/Other: CDI; foam dressings applied to all bony prominences; heel boots in place; head turned Q2 hrs    Infusions: Nimbex, Fentanyl, Versed, and abx infusing    Patient progressing towards goals as tolerated, plan of care communicated and reviewed with Ashwin Madrid and family. All concerns addressed. Will continue to monitor.

## 2018-12-09 NOTE — ASSESSMENT & PLAN NOTE
AST of 159 and ALT of 59; suggestive of hepatitis d/t alcoholic etiology.     - EtOH serum  - Thiamine 500 mg IV x3 days  - Folate 1 mg IV daily  - Abdominal ultrasound complete  - CIWA-Ar once sedation removed  - Systemic corticosteroids  - Nil pentoxifylline

## 2018-12-09 NOTE — PROGRESS NOTES
Received patient from EMT transport intubated with a size 7.5 ET-tube, secured 24cm abdulaziz@ the lip with a commercial tube taveras, patient placed on mechanical ventilation with physician prescribed settings. NO ADVERSE REACTION NOTED.

## 2018-12-09 NOTE — PLAN OF CARE
Outside Transfer Acceptance Note    Transferring Physician or Mid Level Provider/Speciality: Dr. Carpio / Family medicine     Accepting Physician: Jewell Car     Date of Acceptance: 12/08/2018     Code Status: Full    Transferring Facility/Hospital: [facility]     Reason for Transfer [place] [facility]: ARDS, mechanical ventilation, higher level of care     Report from Transferring Physician or Mid-Level provider/Hospital course:     Patient is a 38 year old male with PMH of anxiety, depression, bipolar disorder, ADHD, polysubstance abuse who was [facility] ICU on 12/07/18 for acute respiratory failure from ARDS and pneumonia requiring mechanical ventilation. He initially presented to ED last night with SOB and fever. Imaging showed RML and RLL pneumonia. He was started on rocephin and azithromycin for pneumonia, and kept on NRB supplemental oxygen overnight. He was transitioned to BIPAP with 100% FiO2 this morning for worsening respiratory distress. He became more lethargic later in the day and placed on mechanical ventilation after intubation earlier this afternoon. Blood culture NGTD, renal functional wnl. Antibiotics broadened to vancomycin and zosyn.    Current Vent setting:  Rate 20 PEEP 12 FiO2 100%  Sedation : received rocuronium and on propofol 35 mcg/kg/min  Pressors : levophed started at  0.05 mcg/kg/min due to drop in BP after propofol     ABG : PH 7.32, PCO2 49, PO2 54 on ventilator     Lines : TLC and A-line     VS: Temp 100.3   /67  O2 sat 97%    I have discussed the case with Dr. Nolasco from EICU who accepted patient for ICU at [facility] Referring physician reports that patient's O2 sat improved after a dose of rocuronium and patient is stable for transfer. I asked to check for influenza >> empiric tamiflu, increase PEEP to 15 as suggested by Dr. Nolasco. Requested hospital record including imaging on disc to be sent during transfer.         To do list  upon patient arrival:   -admit to ICU at [facility] main  -resume care per [facility]      DO CARL Nice   Attending Staff Physician   [facility]

## 2018-12-09 NOTE — ASSESSMENT & PLAN NOTE
Pt presents with ARDS and transaminitis consistent with alcoholic etiology. Initial triglycerides of 481 raise concern for possible pancreatitis.     - Lipase, pending  - Abdomen ultrasound complete pending  - Nil propofol for sedation

## 2018-12-09 NOTE — HPI
Mr Julius is a 39 yo M w PMHx significant for polysubstance abuse, bipolar disorder, ADHD, and GERD who was transferred to [facility] on 12/9  from [facility] for higher level of care due to ARDS and need for proning.      As patient was intubated on arrival, the majority of the history was collected from transfer paperwork and chart review. Patient presented to OSH with 3-day history of progressively worsening cough, fever and shortness of breath with associated decrease in PO intake and urine output. On admission, he denied IVDU and stated that he had abstained from his drug of choice (methamphetamine). On admission to OSH, he was noted to have leukocytosis with WBC of 21.4, fever of 102.7F, tachycardia of 127, tachypnea of 41 and hypoxemia with SpO2 of 84%. Imaging studies revealed RLL infiltrate concerning for community-acquired pneumonia.     Over the course of his 24 hours at OSH, his supplementary oxygen requirements steadily increased from nasal cannula to nonrebreather to BiPAP to ultimately intubation. Repeat CXR revealed development of bilateral infiltrates concerning for ARDS; RuA5-ro-FxG4 ratio was 54 and he was paralyzed with intermittent rocuronium.     Labs at OSH also revealed a transaminitis with AST of 186 and ALT of 48.

## 2018-12-09 NOTE — PROGRESS NOTES
Pt placed in prone position with RTx2, charge RN, and MD Morton at bedside. Pt successfully flipped. ETT secure. Bony prominences covered with foam dressings. Will continue to monitor.

## 2018-12-09 NOTE — PROGRESS NOTES
Pts head turned from L to R with RT at bedside. ETT remains secured at previous marking. Will continue to monitor.

## 2018-12-09 NOTE — ASSESSMENT & PLAN NOTE
METABOLIC ACIDOSIS    Pt presents with ARDS and acute hypercapnic respiratory failure; AB.229 / 59.1 / 204 / 100%; bicarbonate of 21. Pt without diarrhea or other electrolyte abnormalities. Suspect iatrogenic hyperchloremic metabolic acidosis on top of respiratory acidosis.    - Permissive hypercapnia and pH goal >7.2

## 2018-12-09 NOTE — SUBJECTIVE & OBJECTIVE
Past Medical History:   Diagnosis Date    ADHD (attention deficit hyperactivity disorder) 2/7/2012    Anxiety     Bipolar 1 disorder 2/7/2012    Depression     GERD (gastroesophageal reflux disease)        Past Surgical History:   Procedure Laterality Date    EGD (ESOPHAGOGASTRODUODENOSCOPY) N/A 9/14/2012    Performed by Juancarlos So Jr., [facility] ENDO    ESOPHAGOGASTRODUODENOSCOPY         Review of patient's allergies indicates:  No Known Allergies    Family History     None        Tobacco Use    Smoking status: Current Every Day Smoker     Packs/day: 1.00     Years: 10.00     Pack years: 10.00     Types: Cigarettes    Smokeless tobacco: Never Used   Substance and Sexual Activity    Alcohol use: No    Drug use: No     Comment: history of MJ at age 17, pain pills, mushrooms, Sober for last 1.5 years    Sexual activity: Yes     Partners: Female      Review of Systems   Unable to perform ROS: Intubated     Objective:     Vital Signs (Most Recent):  Temp: 97.9 °F (36.6 °C) (12/09/18 0515)  Pulse: 104 (12/09/18 0526)  Resp: (!) 32 (12/09/18 0515)  BP: 118/77 (12/09/18 0515)  SpO2: 97 % (12/09/18 0526) Vital Signs (24h Range):  Temp:  [97.9 °F (36.6 °C)-100.3 °F (37.9 °C)] 97.9 °F (36.6 °C)  Pulse:  [] 104  Resp:  [20-32] 32  SpO2:  [90 %-99 %] 97 %  BP: (110-129)/(66-77) 118/77  Arterial Line BP: (113)/(64) 113/64   Weight: 74 kg (163 lb 2.3 oz)  Body mass index is 22.75 kg/m².      Intake/Output Summary (Last 24 hours) at 12/9/2018 0546  Last data filed at 12/9/2018 0500  Gross per 24 hour   Intake --   Output 200 ml   Net -200 ml       Physical Exam   Constitutional: He is oriented to person, place, and time. He appears well-developed and well-nourished. He appears ill. He is sedated and intubated.   HENT:   Head: Normocephalic and atraumatic.   Eyes: Conjunctivae and EOM are normal. Pupils are equal, round, and reactive to light.   Neck: Normal range of motion. Neck supple.   Cardiovascular:  Normal rate, regular rhythm, normal heart sounds and intact distal pulses.   No murmur heard.  Pulmonary/Chest: Effort normal and breath sounds normal. He is intubated. No respiratory distress. He has no wheezes.   Abdominal: Soft. Bowel sounds are normal. He exhibits no distension and no mass.   Lymphadenopathy:     He has no cervical adenopathy.   Neurological: He is oriented to person, place, and time. He is unresponsive.   Skin: Skin is warm and dry.   Vitals reviewed.      Vents:  Vent Mode: A/C (12/09/18 0526)  Ventilator Initiated: Yes (12/09/18 0200)  Set Rate: 32 bmp (12/09/18 0526)  Vt Set: 400 mL (12/09/18 0526)  Pressure Support: 0 cmH20 (12/09/18 0526)  PEEP/CPAP: 10 cmH20 (12/09/18 0526)  Oxygen Concentration (%): 45 (12/09/18 0526)  Peak Airway Pressure: 31 cmH2O (12/09/18 0526)  Plateau Pressure: 27 cmH20 (12/09/18 0526)  Total Ve: 12 mL (12/09/18 0526)  Lines/Drains/Airways     Central Venous Catheter Line                 Percutaneous Central Line Insertion/Assessment - triple lumen  12/09/18 0525 right internal jugular less than 1 day          Drain                 NG/OG Tube 12/09/18 0528 [place] sump 14 Fr. Right mouth less than 1 day         Urethral Catheter 12/09/18 0523 Latex 18 Fr. less than 1 day          Airway                 Airway - Non-Surgical 12/09/18 0530 Endotracheal Tube less than 1 day          Arterial Line                 Arterial Line 12/09/18 0521 Left Radial less than 1 day          Peripheral Intravenous Line                 Peripheral IV - Single Lumen 12/09/18 0522 Left Hand less than 1 day         Peripheral IV - Single Lumen 12/09/18 0523 Left Antecubital less than 1 day         Peripheral IV - Single Lumen 12/09/18 0532 Right Hand less than 1 day              Significant Labs:    CBC/Anemia Profile:  No results for input(s): WBC, HGB, HCT, PLT, MCV, RDW, IRON, FERRITIN, RETIC, FOLATE, [id number], OCCULTBLOOD in the last 48 hours.     Chemistries:  Recent Labs   Lab  12/09/18  0245      K 4.4      CO2 21*   BUN 14   CREATININE 0.7   CALCIUM 7.9*   ALBUMIN 2.2*   PROT 6.0   BILITOT 0.3   ALKPHOS 64   ALT 59*   *   MG 2.2   PHOS 1.8*       All pertinent labs within the past 24 hours have been reviewed.    Significant Imaging: I have reviewed all pertinent imaging results/findings within the past 24 hours.

## 2018-12-10 LAB
ALBUMIN SERPL BCP-MCNC: 2.2 G/DL
ALLENS TEST: ABNORMAL
ALP SERPL-CCNC: 59 U/L
ALT SERPL W/O P-5'-P-CCNC: 50 U/L
ANION GAP SERPL CALC-SCNC: 7 MMOL/L
ANION GAP SERPL CALC-SCNC: 8 MMOL/L
ANION GAP SERPL CALC-SCNC: 8 MMOL/L
ASCENDING AORTA: 3.2 CM
AST SERPL-CCNC: 59 U/L
AV INDEX (PROSTH): 0.87
AV MEAN GRADIENT: 4.18 MMHG
AV PEAK GRADIENT: 6.05 MMHG
AV VALVE AREA: 2.84 CM2
BASOPHILS # BLD AUTO: 0.02 K/UL
BASOPHILS NFR BLD: 0.2 %
BILIRUB SERPL-MCNC: 0.3 MG/DL
BSA FOR ECHO PROCEDURE: 1.93 M2
BUN SERPL-MCNC: 11 MG/DL
BUN SERPL-MCNC: 15 MG/DL
BUN SERPL-MCNC: 16 MG/DL
CALCIUM SERPL-MCNC: 8.1 MG/DL
CALCIUM SERPL-MCNC: 8.3 MG/DL
CALCIUM SERPL-MCNC: 8.6 MG/DL
CHLORIDE SERPL-SCNC: 100 MMOL/L
CHLORIDE SERPL-SCNC: 104 MMOL/L
CHLORIDE SERPL-SCNC: 99 MMOL/L
CO2 SERPL-SCNC: 27 MMOL/L
CO2 SERPL-SCNC: 31 MMOL/L
CO2 SERPL-SCNC: 32 MMOL/L
CREAT SERPL-MCNC: 0.5 MG/DL
CREAT SERPL-MCNC: 0.6 MG/DL
CREAT SERPL-MCNC: 0.7 MG/DL
CV ECHO LV RWT: 0.28 CM
DELSYS: ABNORMAL
DIFFERENTIAL METHOD: ABNORMAL
DOP CALC AO PEAK VEL: 1.23 M/S
DOP CALC AO VTI: 23.65 CM
DOP CALC LVOT AREA: 3.27 CM2
DOP CALC LVOT DIAMETER: 2.04 CM
DOP CALC LVOT STROKE VOLUME: 67.17 CM3
DOP CALCLVOT PEAK VEL VTI: 20.56 CM
E/E' RATIO: 5.77
ECHO LV POSTERIOR WALL: 0.7 CM (ref 0.6–1.1)
EOSINOPHIL # BLD AUTO: 0 K/UL
EOSINOPHIL NFR BLD: 0 %
ERYTHROCYTE [DISTWIDTH] IN BLOOD BY AUTOMATED COUNT: 12.8 %
ERYTHROCYTE [SEDIMENTATION RATE] IN BLOOD BY WESTERGREN METHOD: 32 MM/H
EST. GFR  (AFRICAN AMERICAN): >60 ML/MIN/1.73 M^2
EST. GFR  (NON AFRICAN AMERICAN): >60 ML/MIN/1.73 M^2
FIO2: 35
FRACTIONAL SHORTENING: 37 % (ref 28–44)
GLUCOSE SERPL-MCNC: 123 MG/DL
GLUCOSE SERPL-MCNC: 153 MG/DL
GLUCOSE SERPL-MCNC: 167 MG/DL
HAV IGM SERPL QL IA: NEGATIVE
HBV CORE IGM SERPL QL IA: NEGATIVE
HBV SURFACE AG SERPL QL IA: NEGATIVE
HCO3 UR-SCNC: 29 MMOL/L (ref 24–28)
HCO3 UR-SCNC: 29.5 MMOL/L (ref 24–28)
HCO3 UR-SCNC: 29.9 MMOL/L (ref 24–28)
HCO3 UR-SCNC: 30.9 MMOL/L (ref 24–28)
HCO3 UR-SCNC: 32 MMOL/L (ref 24–28)
HCO3 UR-SCNC: 34.8 MMOL/L (ref 24–28)
HCT VFR BLD AUTO: 32.1 %
HCV AB SERPL QL IA: POSITIVE
HGB BLD-MCNC: 10.3 G/DL
IMM GRANULOCYTES # BLD AUTO: 0.11 K/UL
IMM GRANULOCYTES NFR BLD AUTO: 1 %
INTERVENTRICULAR SEPTUM: 0.62 CM (ref 0.6–1.1)
LA MAJOR: 4.69 CM
LA MINOR: 5.01 CM
LA WIDTH: 3.11 CM
LEFT ATRIUM SIZE: 3.25 CM
LEFT ATRIUM VOLUME INDEX: 21.5 ML/M2
LEFT ATRIUM VOLUME: 41.62 CM3
LEFT INTERNAL DIMENSION IN SYSTOLE: 3.11 CM (ref 2.1–4)
LEFT VENTRICLE DIASTOLIC VOLUME INDEX: 59.73 ML/M2
LEFT VENTRICLE DIASTOLIC VOLUME: 115.77 ML
LEFT VENTRICLE MASS INDEX: 54.1 G/M2
LEFT VENTRICLE SYSTOLIC VOLUME INDEX: 19.7 ML/M2
LEFT VENTRICLE SYSTOLIC VOLUME: 38.14 ML
LEFT VENTRICULAR INTERNAL DIMENSION IN DIASTOLE: 4.95 CM (ref 3.5–6)
LEFT VENTRICULAR MASS: 104.77 G
LV LATERAL E/E' RATIO: 5
LV SEPTAL E/E' RATIO: 6.82
LYMPHOCYTES # BLD AUTO: 0.4 K/UL
LYMPHOCYTES NFR BLD: 3.9 %
MAGNESIUM SERPL-MCNC: 1.8 MG/DL
MAGNESIUM SERPL-MCNC: 2.1 MG/DL
MAGNESIUM SERPL-MCNC: 2.1 MG/DL
MCH RBC QN AUTO: 31.6 PG
MCHC RBC AUTO-ENTMCNC: 32.1 G/DL
MCV RBC AUTO: 99 FL
MIN VOL: 11.3
MIN VOL: 11.4
MODE: ABNORMAL
MONOCYTES # BLD AUTO: 0.4 K/UL
MONOCYTES NFR BLD: 3.4 %
MV PEAK E VEL: 0.75 M/S
NEUTROPHILS # BLD AUTO: 10.4 K/UL
NEUTROPHILS NFR BLD: 91.5 %
NRBC BLD-RTO: 0 /100 WBC
PCO2 BLDA: 40.9 MMHG (ref 35–45)
PCO2 BLDA: 47.9 MMHG (ref 35–45)
PCO2 BLDA: 48 MMHG (ref 35–45)
PCO2 BLDA: 49.8 MMHG (ref 35–45)
PCO2 BLDA: 57.2 MMHG (ref 35–45)
PCO2 BLDA: 60.6 MMHG (ref 35–45)
PEEP: 10
PEEP: 8
PEEP: 8
PH SMN: 7.29 [PH] (ref 7.35–7.45)
PH SMN: 7.31 [PH] (ref 7.35–7.45)
PH SMN: 7.4 [PH] (ref 7.35–7.45)
PH SMN: 7.4 [PH] (ref 7.35–7.45)
PH SMN: 7.43 [PH] (ref 7.35–7.45)
PH SMN: 7.54 [PH] (ref 7.35–7.45)
PHOSPHATE SERPL-MCNC: 1.1 MG/DL
PHOSPHATE SERPL-MCNC: 1.4 MG/DL
PHOSPHATE SERPL-MCNC: <1 MG/DL
PIP: 30
PIP: 31
PLATELET # BLD AUTO: 141 K/UL
PMV BLD AUTO: 10.2 FL
PO2 BLDA: 104 MMHG (ref 80–100)
PO2 BLDA: 69 MMHG (ref 80–100)
PO2 BLDA: 72 MMHG (ref 80–100)
PO2 BLDA: 77 MMHG (ref 80–100)
PO2 BLDA: 79 MMHG (ref 80–100)
PO2 BLDA: 86 MMHG (ref 80–100)
POC BE: 12 MMOL/L
POC BE: 3 MMOL/L
POC BE: 3 MMOL/L
POC BE: 5 MMOL/L
POC BE: 6 MMOL/L
POC BE: 8 MMOL/L
POC SATURATED O2: 93 % (ref 95–100)
POC SATURATED O2: 94 % (ref 95–100)
POC SATURATED O2: 95 % (ref 95–100)
POC SATURATED O2: 99 % (ref 95–100)
POC TCO2: 31 MMOL/L (ref 23–27)
POC TCO2: 32 MMOL/L (ref 23–27)
POC TCO2: 33 MMOL/L (ref 23–27)
POC TCO2: 36 MMOL/L (ref 23–27)
POCT GLUCOSE: 120 MG/DL (ref 70–110)
POCT GLUCOSE: 235 MG/DL (ref 70–110)
POTASSIUM SERPL-SCNC: 3.4 MMOL/L
POTASSIUM SERPL-SCNC: 3.6 MMOL/L
POTASSIUM SERPL-SCNC: 4.2 MMOL/L
PROT SERPL-MCNC: 6.1 G/DL
RA MAJOR: 5.19 CM
RA WIDTH: 4.12 CM
RBC # BLD AUTO: 3.26 M/UL
RIGHT VENTRICULAR END-DIASTOLIC DIMENSION: 2.72 CM
SAMPLE: ABNORMAL
SINUS: 3.25 CM
SITE: ABNORMAL
SODIUM SERPL-SCNC: 138 MMOL/L
SODIUM SERPL-SCNC: 139 MMOL/L
SODIUM SERPL-SCNC: 139 MMOL/L
SP02: 96
SP02: 98
STJ: 2.67 CM
TDI LATERAL: 0.15
TDI SEPTAL: 0.11
TDI: 0.13
TRICUSPID ANNULAR PLANE SYSTOLIC EXCURSION: 2.79 CM
TRIGL SERPL-MCNC: 431 MG/DL
VT: 380
WBC # BLD AUTO: 11.32 K/UL

## 2018-12-10 PROCEDURE — 63600175 PHARM REV CODE 636 W HCPCS: Performed by: STUDENT IN AN ORGANIZED HEALTH CARE EDUCATION/TRAINING PROGRAM

## 2018-12-10 PROCEDURE — 27000221 HC OXYGEN, UP TO 24 HOURS

## 2018-12-10 PROCEDURE — 27100171 HC OXYGEN HIGH FLOW UP TO 24 HOURS

## 2018-12-10 PROCEDURE — 25000003 PHARM REV CODE 250: Performed by: STUDENT IN AN ORGANIZED HEALTH CARE EDUCATION/TRAINING PROGRAM

## 2018-12-10 PROCEDURE — 25000003 PHARM REV CODE 250

## 2018-12-10 PROCEDURE — 94761 N-INVAS EAR/PLS OXIMETRY MLT: CPT

## 2018-12-10 PROCEDURE — 87449 NOS EACH ORGANISM AG IA: CPT

## 2018-12-10 PROCEDURE — 83735 ASSAY OF MAGNESIUM: CPT | Mod: 91

## 2018-12-10 PROCEDURE — 87632 RESP VIRUS 6-11 TARGETS: CPT

## 2018-12-10 PROCEDURE — 80053 COMPREHEN METABOLIC PANEL: CPT

## 2018-12-10 PROCEDURE — 25000003 PHARM REV CODE 250: Performed by: PHYSICIAN ASSISTANT

## 2018-12-10 PROCEDURE — 93010 ELECTROCARDIOGRAM REPORT: CPT | Mod: ,,, | Performed by: INTERNAL MEDICINE

## 2018-12-10 PROCEDURE — 99900026 HC AIRWAY MAINTENANCE (STAT)

## 2018-12-10 PROCEDURE — 20000000 HC ICU ROOM

## 2018-12-10 PROCEDURE — 84100 ASSAY OF PHOSPHORUS: CPT

## 2018-12-10 PROCEDURE — 27200966 HC CLOSED SUCTION SYSTEM

## 2018-12-10 PROCEDURE — 80048 BASIC METABOLIC PNL TOTAL CA: CPT

## 2018-12-10 PROCEDURE — 99291 CRITICAL CARE FIRST HOUR: CPT | Mod: ,,, | Performed by: NURSE PRACTITIONER

## 2018-12-10 PROCEDURE — 99292 CRITICAL CARE ADDL 30 MIN: CPT | Mod: ,,, | Performed by: INTERNAL MEDICINE

## 2018-12-10 PROCEDURE — 82803 BLOOD GASES ANY COMBINATION: CPT

## 2018-12-10 PROCEDURE — 84100 ASSAY OF PHOSPHORUS: CPT | Mod: 91

## 2018-12-10 PROCEDURE — 25000003 PHARM REV CODE 250: Performed by: INTERNAL MEDICINE

## 2018-12-10 PROCEDURE — 63600175 PHARM REV CODE 636 W HCPCS: Performed by: NURSE PRACTITIONER

## 2018-12-10 PROCEDURE — 85025 COMPLETE CBC W/AUTO DIFF WBC: CPT

## 2018-12-10 PROCEDURE — 94003 VENT MGMT INPAT SUBQ DAY: CPT

## 2018-12-10 PROCEDURE — 25000003 PHARM REV CODE 250: Performed by: NURSE PRACTITIONER

## 2018-12-10 PROCEDURE — 84478 ASSAY OF TRIGLYCERIDES: CPT

## 2018-12-10 PROCEDURE — 63600175 PHARM REV CODE 636 W HCPCS

## 2018-12-10 PROCEDURE — 37799 UNLISTED PX VASCULAR SURGERY: CPT

## 2018-12-10 PROCEDURE — 83735 ASSAY OF MAGNESIUM: CPT

## 2018-12-10 PROCEDURE — 27100092 HC HIGH FLOW DELIVERY CANNULA

## 2018-12-10 PROCEDURE — 80074 ACUTE HEPATITIS PANEL: CPT

## 2018-12-10 PROCEDURE — 99900035 HC TECH TIME PER 15 MIN (STAT)

## 2018-12-10 PROCEDURE — 93005 ELECTROCARDIOGRAM TRACING: CPT

## 2018-12-10 PROCEDURE — S0028 INJECTION, FAMOTIDINE, 20 MG: HCPCS | Performed by: STUDENT IN AN ORGANIZED HEALTH CARE EDUCATION/TRAINING PROGRAM

## 2018-12-10 RX ORDER — SODIUM,POTASSIUM PHOSPHATES 280-250MG
2 POWDER IN PACKET (EA) ORAL ONCE
Status: COMPLETED | OUTPATIENT
Start: 2018-12-11 | End: 2018-12-11

## 2018-12-10 RX ORDER — MIDAZOLAM HYDROCHLORIDE 1 MG/ML
2 INJECTION INTRAMUSCULAR; INTRAVENOUS ONCE
Status: COMPLETED | OUTPATIENT
Start: 2018-12-10 | End: 2018-12-10

## 2018-12-10 RX ORDER — DEXMEDETOMIDINE HYDROCHLORIDE 4 UG/ML
INJECTION, SOLUTION INTRAVENOUS
Status: COMPLETED
Start: 2018-12-10 | End: 2018-12-10

## 2018-12-10 RX ORDER — HYDROMORPHONE HYDROCHLORIDE 1 MG/ML
1 INJECTION, SOLUTION INTRAMUSCULAR; INTRAVENOUS; SUBCUTANEOUS ONCE
Status: COMPLETED | OUTPATIENT
Start: 2018-12-10 | End: 2018-12-10

## 2018-12-10 RX ORDER — POTASSIUM CHLORIDE 20 MEQ/15ML
40 SOLUTION ORAL
Status: DISCONTINUED | OUTPATIENT
Start: 2018-12-10 | End: 2018-12-15

## 2018-12-10 RX ORDER — MIDAZOLAM HYDROCHLORIDE 1 MG/ML
2 INJECTION INTRAMUSCULAR; INTRAVENOUS
Status: DISCONTINUED | OUTPATIENT
Start: 2018-12-10 | End: 2018-12-12

## 2018-12-10 RX ORDER — HYDROMORPHONE HYDROCHLORIDE 1 MG/ML
1 INJECTION, SOLUTION INTRAMUSCULAR; INTRAVENOUS; SUBCUTANEOUS
Status: DISCONTINUED | OUTPATIENT
Start: 2018-12-10 | End: 2018-12-12

## 2018-12-10 RX ORDER — POTASSIUM CHLORIDE 20 MEQ/15ML
60 SOLUTION ORAL
Status: DISCONTINUED | OUTPATIENT
Start: 2018-12-10 | End: 2018-12-15

## 2018-12-10 RX ORDER — OSELTAMIVIR PHOSPHATE 6 MG/ML
75 FOR SUSPENSION ORAL 2 TIMES DAILY
Status: DISCONTINUED | OUTPATIENT
Start: 2018-12-10 | End: 2018-12-11

## 2018-12-10 RX ORDER — NOREPINEPHRINE BITARTRATE/D5W 4MG/250ML
0.02 PLASTIC BAG, INJECTION (ML) INTRAVENOUS CONTINUOUS
Status: DISCONTINUED | OUTPATIENT
Start: 2018-12-10 | End: 2018-12-12

## 2018-12-10 RX ORDER — MAGNESIUM SULFATE HEPTAHYDRATE 40 MG/ML
2 INJECTION, SOLUTION INTRAVENOUS
Status: DISCONTINUED | OUTPATIENT
Start: 2018-12-10 | End: 2018-12-15

## 2018-12-10 RX ORDER — INSULIN ASPART 100 [IU]/ML
0-5 INJECTION, SOLUTION INTRAVENOUS; SUBCUTANEOUS EVERY 6 HOURS PRN
Status: DISCONTINUED | OUTPATIENT
Start: 2018-12-10 | End: 2018-12-16 | Stop reason: HOSPADM

## 2018-12-10 RX ORDER — THIAMINE HCL 100 MG
100 TABLET ORAL DAILY
Status: DISCONTINUED | OUTPATIENT
Start: 2018-12-11 | End: 2018-12-12

## 2018-12-10 RX ORDER — PROPOFOL 10 MG/ML
INJECTION, EMULSION INTRAVENOUS
Status: COMPLETED
Start: 2018-12-10 | End: 2018-12-10

## 2018-12-10 RX ORDER — MAGNESIUM SULFATE HEPTAHYDRATE 40 MG/ML
4 INJECTION, SOLUTION INTRAVENOUS
Status: DISCONTINUED | OUTPATIENT
Start: 2018-12-10 | End: 2018-12-15

## 2018-12-10 RX ORDER — DEXMEDETOMIDINE HYDROCHLORIDE 4 UG/ML
0.2 INJECTION, SOLUTION INTRAVENOUS CONTINUOUS
Status: DISCONTINUED | OUTPATIENT
Start: 2018-12-10 | End: 2018-12-14

## 2018-12-10 RX ORDER — GLUCAGON 1 MG
1 KIT INJECTION
Status: DISCONTINUED | OUTPATIENT
Start: 2018-12-10 | End: 2018-12-16 | Stop reason: HOSPADM

## 2018-12-10 RX ORDER — FUROSEMIDE 10 MG/ML
40 INJECTION INTRAMUSCULAR; INTRAVENOUS ONCE
Status: COMPLETED | OUTPATIENT
Start: 2018-12-10 | End: 2018-12-10

## 2018-12-10 RX ORDER — ACETAMINOPHEN 650 MG/20.3ML
650 LIQUID ORAL EVERY 4 HOURS PRN
Status: DISCONTINUED | OUTPATIENT
Start: 2018-12-10 | End: 2018-12-13

## 2018-12-10 RX ADMIN — DEXMEDETOMIDINE HYDROCHLORIDE 1.4 MCG/KG/HR: 100 INJECTION, SOLUTION, CONCENTRATE INTRAVENOUS at 08:12

## 2018-12-10 RX ADMIN — MAGNESIUM SULFATE IN WATER 2 G: 40 INJECTION, SOLUTION INTRAVENOUS at 10:12

## 2018-12-10 RX ADMIN — FUROSEMIDE 40 MG: 10 INJECTION, SOLUTION INTRAMUSCULAR; INTRAVENOUS at 05:12

## 2018-12-10 RX ADMIN — Medication 25 MCG/HR: at 06:12

## 2018-12-10 RX ADMIN — HYDROMORPHONE HYDROCHLORIDE 1 MG: 1 INJECTION, SOLUTION INTRAMUSCULAR; INTRAVENOUS; SUBCUTANEOUS at 03:12

## 2018-12-10 RX ADMIN — METHYLPREDNISOLONE SODIUM SUCCINATE 80 MG: 125 INJECTION, POWDER, FOR SOLUTION INTRAMUSCULAR; INTRAVENOUS at 09:12

## 2018-12-10 RX ADMIN — VANCOMYCIN HYDROCHLORIDE 1250 MG: 10 INJECTION, POWDER, LYOPHILIZED, FOR SOLUTION INTRAVENOUS at 08:12

## 2018-12-10 RX ADMIN — PIPERACILLIN AND TAZOBACTAM 4.5 G: 4; .5 INJECTION, POWDER, LYOPHILIZED, FOR SOLUTION INTRAVENOUS; PARENTERAL at 06:12

## 2018-12-10 RX ADMIN — DEXMEDETOMIDINE HYDROCHLORIDE 0.2 MCG/KG/HR: 100 INJECTION, SOLUTION, CONCENTRATE INTRAVENOUS at 03:12

## 2018-12-10 RX ADMIN — METHYLPREDNISOLONE SODIUM SUCCINATE 80 MG: 125 INJECTION, POWDER, FOR SOLUTION INTRAMUSCULAR; INTRAVENOUS at 05:12

## 2018-12-10 RX ADMIN — FAMOTIDINE 20 MG: 10 INJECTION, SOLUTION INTRAVENOUS at 08:12

## 2018-12-10 RX ADMIN — OSELTAMIVIR PHOSPHATE 75 MG: 6 POWDER, FOR SUSPENSION ORAL at 08:12

## 2018-12-10 RX ADMIN — ACETAMINOPHEN 650 MG: 650 SOLUTION ORAL at 07:12

## 2018-12-10 RX ADMIN — Medication 0.01 MCG/KG/MIN: at 04:12

## 2018-12-10 RX ADMIN — FOLIC ACID 1 MG: 1 TABLET ORAL at 08:12

## 2018-12-10 RX ADMIN — MINERAL OIL AND WHITE PETROLATUM: 150; 830 OINTMENT OPHTHALMIC at 02:12

## 2018-12-10 RX ADMIN — MINERAL OIL AND WHITE PETROLATUM: 150; 830 OINTMENT OPHTHALMIC at 09:12

## 2018-12-10 RX ADMIN — MINERAL OIL AND WHITE PETROLATUM: 150; 830 OINTMENT OPHTHALMIC at 05:12

## 2018-12-10 RX ADMIN — THIAMINE HYDROCHLORIDE 500 MG: 100 INJECTION, SOLUTION INTRAMUSCULAR; INTRAVENOUS at 12:12

## 2018-12-10 RX ADMIN — PROPOFOL 10 MCG/KG/MIN: 10 INJECTION, EMULSION INTRAVENOUS at 05:12

## 2018-12-10 RX ADMIN — MIDAZOLAM 10 MG/HR: 5 INJECTION INTRAMUSCULAR; INTRAVENOUS at 11:12

## 2018-12-10 RX ADMIN — FUROSEMIDE 40 MG: 10 INJECTION, SOLUTION INTRAMUSCULAR; INTRAVENOUS at 01:12

## 2018-12-10 RX ADMIN — MIDAZOLAM 4 MG/HR: 5 INJECTION INTRAMUSCULAR; INTRAVENOUS at 01:12

## 2018-12-10 RX ADMIN — HYDROMORPHONE HYDROCHLORIDE 1 MG: 1 INJECTION, SOLUTION INTRAMUSCULAR; INTRAVENOUS; SUBCUTANEOUS at 04:12

## 2018-12-10 RX ADMIN — METHYLPREDNISOLONE SODIUM SUCCINATE 80 MG: 125 INJECTION, POWDER, FOR SOLUTION INTRAMUSCULAR; INTRAVENOUS at 02:12

## 2018-12-10 RX ADMIN — Medication 350 MCG/HR: at 09:12

## 2018-12-10 RX ADMIN — MIDAZOLAM HYDROCHLORIDE 2 MG: 1 INJECTION, SOLUTION INTRAMUSCULAR; INTRAVENOUS at 03:12

## 2018-12-10 RX ADMIN — DIBASIC SODIUM PHOSPHATE, MONOBASIC POTASSIUM PHOSPHATE AND MONOBASIC SODIUM PHOSPHATE 2 TABLET: 852; 155; 130 TABLET ORAL at 12:12

## 2018-12-10 RX ADMIN — MIDAZOLAM 7 MG/HR: 5 INJECTION INTRAMUSCULAR; INTRAVENOUS at 05:12

## 2018-12-10 RX ADMIN — AZITHROMYCIN MONOHYDRATE 500 MG: 500 INJECTION, POWDER, LYOPHILIZED, FOR SOLUTION INTRAVENOUS at 09:12

## 2018-12-10 RX ADMIN — ENOXAPARIN SODIUM 40 MG: 100 INJECTION SUBCUTANEOUS at 04:12

## 2018-12-10 RX ADMIN — DIBASIC SODIUM PHOSPHATE, MONOBASIC POTASSIUM PHOSPHATE AND MONOBASIC SODIUM PHOSPHATE 2 TABLET: 852; 155; 130 TABLET ORAL at 05:12

## 2018-12-10 RX ADMIN — PIPERACILLIN AND TAZOBACTAM 4.5 G: 4; .5 INJECTION, POWDER, LYOPHILIZED, FOR SOLUTION INTRAVENOUS; PARENTERAL at 03:12

## 2018-12-10 RX ADMIN — SODIUM PHOSPHATE, MONOBASIC, MONOHYDRATE 30 MMOL: 276; 142 INJECTION, SOLUTION INTRAVENOUS at 10:12

## 2018-12-10 RX ADMIN — KETAMINE HYDROCHLORIDE 5 MCG/KG/MIN: 50 INJECTION, SOLUTION INTRAMUSCULAR; INTRAVENOUS at 08:12

## 2018-12-10 RX ADMIN — Medication 350 MCG/HR: at 02:12

## 2018-12-10 RX ADMIN — POTASSIUM PHOSPHATE, MONOBASIC AND POTASSIUM PHOSPHATE, DIBASIC 15 MMOL: 224; 236 INJECTION, SOLUTION, CONCENTRATE INTRAVENOUS at 10:12

## 2018-12-10 RX ADMIN — PIPERACILLIN AND TAZOBACTAM 4.5 G: 4; .5 INJECTION, POWDER, LYOPHILIZED, FOR SOLUTION INTRAVENOUS; PARENTERAL at 10:12

## 2018-12-10 RX ADMIN — CHLORHEXIDINE GLUCONATE 0.12% ORAL RINSE 15 ML: 1.2 LIQUID ORAL at 08:12

## 2018-12-10 RX ADMIN — OSELTAMIVIR PHOSPHATE 75 MG: 6 POWDER, FOR SUSPENSION ORAL at 09:12

## 2018-12-10 RX ADMIN — POTASSIUM CHLORIDE 40 MEQ: 20 SOLUTION ORAL at 04:12

## 2018-12-10 RX ADMIN — CHLORHEXIDINE GLUCONATE 0.12% ORAL RINSE 15 ML: 1.2 LIQUID ORAL at 09:12

## 2018-12-10 RX ADMIN — POTASSIUM CHLORIDE 40 MEQ: 20 SOLUTION ORAL at 10:12

## 2018-12-10 RX ADMIN — CISATRACURIUM BESYLATE 2.5 MCG/KG/MIN: 10 INJECTION INTRAVENOUS at 12:12

## 2018-12-10 RX ADMIN — DIBASIC SODIUM PHOSPHATE, MONOBASIC POTASSIUM PHOSPHATE AND MONOBASIC SODIUM PHOSPHATE 2 TABLET: 852; 155; 130 TABLET ORAL at 02:12

## 2018-12-10 NOTE — ASSESSMENT & PLAN NOTE
- AST of 159 and ALT of 59; suggestive alcoholic etiology.   - EtOH <10  - Thiamine and folate replacement  - Abdominal ultrasound pending  - CIWA-Ar once sedation removed

## 2018-12-10 NOTE — SIGNIFICANT EVENT
Procedure Note  Supine Positioning  Critical Care Medicine       Chief Complaint: ARDS (adult respiratory distress syndrome)  MRN: [id number]  LOS: 1  Sex: male  Age: 38 y.o.      Last ABG:   Recent Labs   Lab 12/10/18  0606   PH 7.312*   PO2 86   PCO2 57.2*   HCO3 29.0*   BE 3       Vital Signs (Most Recent):   Temp: 98.3 °F (36.8 °C) (12/10/18 0705)  Pulse: 94 (12/10/18 0900)  Resp: (!) 32 (12/10/18 0900)  BP: (!) 137/95 (12/10/18 0800)  SpO2: 98 % (12/10/18 0900)    Ventilator Settings:  Vent Mode: A/C  Oxygen Concentration (%):  [30-50] 35  Resp Rate Total:  [32 br/min-41 br/min] 32 br/min  Vt Set:  [380 mL] 380 mL  PEEP/CPAP:  [8 cmH20-10 cmH20] 8 cmH20  Pressure Support:  [0 cmH20] 0 cmH20  Mean Airway Pressure:  [15 tsS62-80 cmH20] 16 cmH20        Indication: Severe, refractory ARDS. High risk for deterioration during supination procedure in need of direct monitoring by Critical Care personnel.     Prior to beginning the procedure, all appropriate equipment and personnel were gathered in the room. Two individuals were placed on each side of the patient and two respiratory therapists stood at the head of the bed and was dedicated to the management of the head of the patient, the endotracheal tube, and the ventilator lines. The person at the head of the bed coordinated the steps of the supination procedure at the direction of Critical Care team members at the bedside. The patient was first log-rolled 90 degrees onto their side away from the direction of their central venous catheter. Cardiac electrodes were then moved from the patient's posterior back to the anterior. Once properly positioned in the bed, another 90 degree log roll was performed placing the patient into the supine position. The patient's arms were placed alongside the body. Continuous pulse oximetry and blood pressure monitoring was performed without any desaturation or hemodynamic instability. The patient tolerated the procedure well.     Total  Critical Care time (uninterrupted not including procedures): 15 minutes     Amanda Piper [facility]  Critical Care Medicine

## 2018-12-10 NOTE — NURSING
Pt placed in supine position with RT x2, charge nurse, 3 RNs, PCT and NPs- Janessa and Amanda at bedside. Pt successfully flipped with ET tube secure. NP ordered for ABG in an hour. Will continue to monitor patient closely.

## 2018-12-10 NOTE — PLAN OF CARE
Payor: MEDICARE / Plan: MEDICARE PART A & B / Product Type: Government /     [id number] - [facility] [place] - [address]  [address]  [facility] [place] [address]  Phone: [phone] Fax: [phone]    [id number] - [place] - [address] AT [facility] OF [facility] [address]  [address]  [facility] [place] [address]  Phone: [phone] Fax: [phone]    No future appointments.    Extended Emergency Contact Information  Primary Emergency Contact: Neema Madrid   [place]  Mobile Phone: [phone]  Relation: Mother       12/10/18 1436   Discharge Assessment   Assessment Type Discharge Planning Assessment   Confirmed/corrected address and phone number on facesheet? No   Assessment information obtained from? Medical Record   Expected Length of Stay (days) 5   Communicated expected length of stay with patient/caregiver no   Prior to hospitilization cognitive status: Alert/Oriented   Prior to hospitalization functional status: Independent   Current cognitive status: Coma/Sedated/Intubated   Current Functional Status: Completely Dependent   [facility] [place]   Lives With mother   Able to Return to Prior Arrangements other (see comments)  (ARETHA)   Is patient able to care for self after discharge? Unable to determine at this time (comments)   Who are your caregiver(s) and their phone number(s)? Neema Madrid (Mother) [phone]    Patient's perception of discharge disposition admitted as an inpatient   Readmission Within the Last 30 Days no previous admission in last 30 days   Patient currently being followed by outpatient case management? No   Patient currently receives any other outside agency services? No   Equipment Currently Used at Home none   Do you have any problems affording any of your prescribed medications? TBD   Is the patient taking medications as prescribed? (TBD)   Does the patient  have transportation home? Yes   Transportation Anticipated family or friend will provide   Does the patient receive services at the [facility]? No   Discharge Plan A Home with family   Discharge Plan B Home   Patient/Family in Agreement with Plan unable to assess   Asiya Marquez RN, BSN, CCM  Case Management  [facility]  Ext. [phone]

## 2018-12-10 NOTE — SUBJECTIVE & OBJECTIVE
Interval History/Significant Events: No acute events over night. P:F 250. Supinated at 0930 with no problems.     Review of Systems   Unable to perform ROS: Intubated     Objective:     Vital Signs (Most Recent):  Temp: 98.9 °F (37.2 °C) (12/10/18 1100)  Pulse: (!) 132 (12/10/18 1412)  Resp: (!) 32 (12/10/18 1412)  BP: 108/69 (12/10/18 1100)  SpO2: 95 % (12/10/18 1412) Vital Signs (24h Range):  Temp:  [98.1 °F (36.7 °C)-98.9 °F (37.2 °C)] 98.9 °F (37.2 °C)  Pulse:  [] 132  Resp:  [32] 32  SpO2:  [85 %-99 %] 95 %  BP: (108-146)/(69-97) 108/69  Arterial Line BP: (106-163)/(60-88) 115/66   Weight: 74.4 kg (164 lb)  Body mass index is 22.87 kg/m².      Intake/Output Summary (Last 24 hours) at 12/10/2018 1514  Last data filed at 12/10/2018 1425  Gross per 24 hour   Intake 2465.26 ml   Output 2440 ml   Net 25.26 ml       Physical Exam   Constitutional: He appears well-developed and well-nourished. No distress. He is sedated, chemically paralyzed and intubated.   HENT:   Head: Normocephalic and atraumatic.   Eyes: Pupils are equal, round, and reactive to light. No scleral icterus.   Periorbital edema   Neck: No JVD present. No tracheal deviation present.   Cardiovascular: Regular rhythm and normal heart sounds. Tachycardia present.   Pulses:       Radial pulses are 2+ on the right side, and 2+ on the left side.        Dorsalis pedis pulses are 2+ on the right side, and 2+ on the left side.   Pulmonary/Chest: Effort normal and breath sounds normal. He is intubated. He has no wheezes. He has no rhonchi. He has no rales.   Abdominal: Soft. He exhibits no distension. Bowel sounds are decreased.   Genitourinary:   Genitourinary Comments: Neri with clear yellow output    Neurological:   Patient paralyzed on nimbex and sedated on versed and fentanyl. PERRL 4mm Flip.    Skin: Skin is warm and dry. Capillary refill takes 2 to 3 seconds. No rash noted. He is not diaphoretic. No erythema.   Nursing note and vitals  reviewed.      Vents:  Vent Mode: A/C (12/10/18 1412)  Ventilator Initiated: Yes (12/09/18 0200)  Set Rate: 32 bmp (12/10/18 1412)  Vt Set: 380 mL (12/10/18 1412)  Pressure Support: 0 cmH20 (12/10/18 1412)  PEEP/CPAP: 8 cmH20 (12/10/18 1412)  Oxygen Concentration (%): 35 (12/10/18 1412)  Peak Airway Pressure: 32 cmH2O (12/10/18 1412)  Plateau Pressure: 22 cmH20 (12/10/18 1412)  Total Ve: 11.4 mL (12/10/18 1412)  F/VT Ratio<105 (RSBI): (!) 89.89 (12/10/18 1412)  Lines/Drains/Airways     Central Venous Catheter Line                 Percutaneous Central Line Insertion/Assessment - triple lumen  12/08/18 0525 right internal jugular 2 days          Drain                 NG/OG Tube 12/09/18 0528 Orla sump 14 Fr. Right mouth 1 day         Urethral Catheter 12/09/18 0523 Latex 18 Fr. 1 day          Airway                 Airway - Non-Surgical 12/09/18 0530 Endotracheal Tube 1 day          Arterial Line                 Arterial Line 12/08/18 0521 Left Radial 2 days          Peripheral Intravenous Line                 Peripheral IV - Single Lumen 12/08/18 0522 Left Hand 2 days         Peripheral IV - Single Lumen 12/08/18 0523 Left Antecubital 2 days         Peripheral IV - Single Lumen 12/08/18 0532 Right Hand 2 days              Significant Labs:    CBC/Anemia Profile:  Recent Labs   Lab 12/09/18  0555 12/10/18  0229   WBC 14.72* 11.32   HGB 10.0* 10.3*   HCT 30.6* 32.1*   * 141*   MCV 97 99*   RDW 12.7 12.8        Chemistries:  Recent Labs   Lab 12/09/18  0245 12/10/18  0229 12/10/18  1420    139 139   K 4.4 4.2 3.6    104 100   CO2 21* 27 31*   BUN 14 11 15   CREATININE 0.7 0.6 0.5   CALCIUM 7.9* 8.3* 8.6*   ALBUMIN 2.2* 2.2*  --    PROT 6.0 6.1  --    BILITOT 0.3 0.3  --    ALKPHOS 64 59  --    ALT 59* 50*  --    * 59*  --    MG 2.2 2.1 2.1   PHOS 1.8* 1.1* 1.4*       All pertinent labs within the past 24 hours have been reviewed.    Significant Imaging:  I have reviewed all pertinent imaging  results/findings within the past 24 hours.

## 2018-12-10 NOTE — ASSESSMENT & PLAN NOTE
- viral vs bacterial   - viral panel in process  - covering with vanc, zosyn, and azithro  - tamiflu   - IV solumedrol

## 2018-12-10 NOTE — PLAN OF CARE
Problem: Adult Inpatient Plan of Care  Goal: Plan of Care Review    No acute events throughout day. See vital signs and assessments in flowsheets. See below for updates on today's progress.     Pulmonary: Pt remains on vent, prone. PEEP of 10, FIO2 35, RR 32, tV 380. PH 7.2's, PO2 70's-90's, CO2 60's, spO2 mid-90's. Lungs diminished throughout without adventitious sounds. Head turned Q2 hours without incident.    Cardiovascular: 's/70's with MAP 80's, 's at beginning of shift, down to high 90's. +2 pulses in all extremities.     Neurological: Pt paralyzed with nimbex, on fentanyl and versed. TOF on both facial nerves, 2/4 at 6, BIS 30's. Pt hypertensive/tachycardic/increased BIS score with head turns.    Gastrointestinal: NPO. No BM's. Bowel sounds hypoactive.     Genitourinary: Neri in place, voiding  mL clear yellow urine/hour    Endocrine: No endocrine dysfunction noted.    Integumentary/Other: Skin intact. IV's to L AC, bilateral hands, R IJ.    Infusions: Nimbex 2.5mcg/kg/min, fentanyl 350mcg/hour, versed 4mg/hour    Patient progressing towards goals as tolerated, plan of care communicated and reviewed with Ashwin Madrid and family. All concerns addressed. Will continue to monitor.

## 2018-12-10 NOTE — PROGRESS NOTES
[facility]  Critical Care Medicine  Progress Note    Patient Name: Ashwin Madrid  MRN: [id number]  Admission Date: 12/9/2018  Hospital Length of Stay: 1 days  Code Status: Full Code  Attending Provider: Jv Nicole*  Primary Care Provider: Primary Doctor No   Principal Problem: ARDS (adult respiratory distress syndrome)    Subjective:     HPI:  Mr Madrid is a 39 yo M w PMHx significant for COPD (nil PFT), bipolar disorder, ADHD, polysubstance abuse and GERD who was transferred to [facility] d/t concern for ARDS requiring proning.     As pt was intubated on arrival, the majority of the history was collected from transfer paperwork and chart review. Pt presented to OSH with 3-day history of progressively worsening cough, fever and shortness of breath with associated decrease in PO intake and urine output. On admission, he denied IVDU and stated that he had abstained from his drug of choice (methamphetamine). On admission to OSH, he was noted to have leukocytosis with WBC of 21.4, fever of 102.7F, tachycardia of 127, tachypnea of 41 and hypoxemia with SpO2 of 84%. Imaging studies revealed RLL infiltrate concerning for community-acquired pneumonia.     Over the course of his 24 hours at OSH, his supplementary oxygen requirements steadily increased from nasal cannula to nonrebreather to BiPAP to ultimately intubation. Repeat CXR revealed development of bilateral infiltrates concerning for ARDS; RoN5-lp-MmE4 ratio was 54 and he was paralyzed with intermittent rocuronium.     Labs at OSH also revealed a transaminitis with AST of 186 and ALT of 48.     Hospital/ICU Course:  Transferred to MICU on 12/9 intubated, paralyzed, and sedated. Prone positioning for P:F 54. Supinated on 12/10 at 0930, P:F 200. Paralytic discontinued with severe agitation that improved with Dilaudid.     Interval History/Significant Events: No acute events over night. P:F 250. Supinated at 0930  with no problems.     Review of Systems   Unable to perform ROS: Intubated     Objective:     Vital Signs (Most Recent):  Temp: 98.9 °F (37.2 °C) (12/10/18 1100)  Pulse: (!) 132 (12/10/18 1412)  Resp: (!) 32 (12/10/18 1412)  BP: 108/69 (12/10/18 1100)  SpO2: 95 % (12/10/18 1412) Vital Signs (24h Range):  Temp:  [98.1 °F (36.7 °C)-98.9 °F (37.2 °C)] 98.9 °F (37.2 °C)  Pulse:  [] 132  Resp:  [32] 32  SpO2:  [85 %-99 %] 95 %  BP: (108-146)/(69-97) 108/69  Arterial Line BP: (106-163)/(60-88) 115/66   Weight: 74.4 kg (164 lb)  Body mass index is 22.87 kg/m².      Intake/Output Summary (Last 24 hours) at 12/10/2018 1514  Last data filed at 12/10/2018 1425  Gross per 24 hour   Intake 2465.26 ml   Output 2440 ml   Net 25.26 ml       Physical Exam   Constitutional: He appears well-developed and well-nourished. No distress. He is sedated, chemically paralyzed and intubated.   HENT:   Head: Normocephalic and atraumatic.   Eyes: Pupils are equal, round, and reactive to light. No scleral icterus.   Periorbital edema   Neck: No JVD present. No tracheal deviation present.   Cardiovascular: Regular rhythm and normal heart sounds. Tachycardia present.   Pulses:       Radial pulses are 2+ on the right side, and 2+ on the left side.        Dorsalis pedis pulses are 2+ on the right side, and 2+ on the left side.   Pulmonary/Chest: Effort normal and breath sounds normal. He is intubated. He has no wheezes. He has no rhonchi. He has no rales.   Abdominal: Soft. He exhibits no distension. Bowel sounds are decreased.   Genitourinary:   Genitourinary Comments: Neri with clear yellow output    Neurological:   Patient paralyzed on nimbex and sedated on versed and fentanyl. PERRL 4mm Flip.    Skin: Skin is warm and dry. Capillary refill takes 2 to 3 seconds. No rash noted. He is not diaphoretic. No erythema.   Nursing note and vitals reviewed.      Vents:  Vent Mode: A/C (12/10/18 1412)  Ventilator Initiated: Yes (12/09/18 0200)  Set  Rate: 32 bmp (12/10/18 1412)  Vt Set: 380 mL (12/10/18 1412)  Pressure Support: 0 cmH20 (12/10/18 1412)  PEEP/CPAP: 8 cmH20 (12/10/18 1412)  Oxygen Concentration (%): 35 (12/10/18 1412)  Peak Airway Pressure: 32 cmH2O (12/10/18 1412)  Plateau Pressure: 22 cmH20 (12/10/18 1412)  Total Ve: 11.4 mL (12/10/18 1412)  F/VT Ratio<105 (RSBI): (!) 89.89 (12/10/18 1412)  Lines/Drains/Airways     Central Venous Catheter Line                 Percutaneous Central Line Insertion/Assessment - triple lumen  12/08/18 0525 right internal jugular 2 days          Drain                 NG/OG Tube 12/09/18 0528 Armstrong sump 14 Fr. Right mouth 1 day         Urethral Catheter 12/09/18 0523 Latex 18 Fr. 1 day          Airway                 Airway - Non-Surgical 12/09/18 0530 Endotracheal Tube 1 day          Arterial Line                 Arterial Line 12/08/18 0521 Left Radial 2 days          Peripheral Intravenous Line                 Peripheral IV - Single Lumen 12/08/18 0522 Left Hand 2 days         Peripheral IV - Single Lumen 12/08/18 0523 Left Antecubital 2 days         Peripheral IV - Single Lumen 12/08/18 0532 Right Hand 2 days              Significant Labs:    CBC/Anemia Profile:  Recent Labs   Lab 12/09/18  0555 12/10/18  0229   WBC 14.72* 11.32   HGB 10.0* 10.3*   HCT 30.6* 32.1*   * 141*   MCV 97 99*   RDW 12.7 12.8        Chemistries:  Recent Labs   Lab 12/09/18  0245 12/10/18  0229 12/10/18  1420    139 139   K 4.4 4.2 3.6    104 100   CO2 21* 27 31*   BUN 14 11 15   CREATININE 0.7 0.6 0.5   CALCIUM 7.9* 8.3* 8.6*   ALBUMIN 2.2* 2.2*  --    PROT 6.0 6.1  --    BILITOT 0.3 0.3  --    ALKPHOS 64 59  --    ALT 59* 50*  --    * 59*  --    MG 2.2 2.1 2.1   PHOS 1.8* 1.1* 1.4*       All pertinent labs within the past 24 hours have been reviewed.    Significant Imaging:  I have reviewed all pertinent imaging results/findings within the past 24 hours.    Assessment/Plan:     Psychiatric   Anxiety    Home  regimen includes propranolol and escitalopram    - Will hold while sedated     Bipolar 1 disorder    - QTc 452  - Restart wellbutrin and zyprexa, hold other home meds for now       Pulmonary   * ARDS (adult respiratory distress syndrome)    - Likely 2/2 pneumonia; initial P:F 54  - abx, antiviral, and steroids  - ARDSnet Protocol  - Target 6-8ml/kg Ideal Body Weight.   - Plateau pressure goal <30cm H2O.  - Oxygenation goal PaO2 55-80 or SpO2 88-95%.  - Sedated, paralyzed, and proned on   - Improving - supinated and paralytic d/c on 12/10  - Diurese with goal CVP of 4       Pneumonia    - viral vs bacterial   - viral panel in process  - covering with vanc, zosyn, and azithro  - tamiflu   - IV solumedrol     Cardiac/Vascular   Hypertriglyceridemia    - Lipase WNL  - Abdomen ultrasound  pending  - No propofol for sedation      Renal/   Respiratory acidosis    METABOLIC ACIDOSIS    Pt presents with ARDS and acute hypercapnic respiratory failure; AB.229 / 59.1 / 204 / 100%; bicarbonate of 21. Pt without diarrhea or other electrolyte abnormalities. Suspect iatrogenic hyperchloremic metabolic acidosis on top of respiratory acidosis.    - Permissive hypercapnia and pH goal >7.2      GI   Transaminitis    - AST of 159 and ALT of 59; suggestive alcoholic etiology.   - EtOH <10  - Thiamine and folate replacement  - Abdominal ultrasound pending  - CIWA-Ar once sedation removed         Other   Primary insomnia    Home regimen includes amitriptyline 10 QHS PRN and melatonin QHS     - Will hold while sedated        Critical Care Daily Checklist:    A: Awake: RASS Goal/Actual Goal:  Sedated/ Paralyzed  Actual: Calix Agitation Sedation Scale (RASS): Unarousable   B: Spontaneous Breathing Trial Performed?  N/A   C: SAT & SBT Coordinated?  N/A   D: Delirium: CAM-ICU Overall CAM-ICU: Positive   E: Early Mobility Performed? Yes   F: Feeding Goal:    Status:     Current Diet Order   Procedures    Diet NPO      AS:  Analgesia/Sedation Versed, Fentanyl   T: Thromboembolic Prophylaxis Lovenox   H: HOB > 300 Yes   U: Stress Ulcer Prophylaxis (if needed) pepcid   G: Glucose Control BG q6 w/ SS   B: Bowel Function     I: Indwelling Catheter (Lines & Lagunas) Necessity TLC, lagunas   D: De-escalation of Antimicrobials/Pharmacotherapies N/A    Plan for the day/ETD Supination, d/c paralytic    Code Status:  Family/Goals of Care: Full Code       Plan of care discussed with Dr. Nicole.     Critical Care Time: 55 minutes  Critical secondary to Patient has a condition that poses threat to life and bodily function: ARDS      Critical care was time spent personally by me on the following activities: development of treatment plan with patient or surrogate and bedside caregivers, discussions with consultants, evaluation of patient's response to treatment, examination of patient, ordering and performing treatments and interventions, ordering and review of laboratory studies, ordering and review of radiographic studies, pulse oximetry, re-evaluation of patient's condition. This critical care time did not overlap with that of any other provider or involve time for any procedures.     Amanda Piper NP  Critical Care Medicine  [facility]

## 2018-12-10 NOTE — PT/OT/SLP PROGRESS
Physical Therapy  Missed Visit    Patient Name:  Ashwin Madrid   MRN:  [id number]  Admitting Diagnosis:  ARDS (adult respiratory distress syndrome)   Recent Surgery: * No surgery found *      Patient not seen today secondary to Unavailable (Comment)(Pt failed MOVE screen, supinated at 930am. Pt not appropriate for mobility on this date due to medical complexity. ). Will follow-up as POC allows.    Lisa López PT, DPT  12/10/2018  Pager: [phone]

## 2018-12-10 NOTE — ASSESSMENT & PLAN NOTE
- Likely 2/2 pneumonia; initial P:F 54  - abx, antiviral, and steroids  - ARDSnet Protocol  - Target 6-8ml/kg Ideal Body Weight.   - Plateau pressure goal <30cm H2O.  - Oxygenation goal PaO2 55-80 or SpO2 88-95%.  - Sedated, paralyzed, and proned on 12/9  - Improving - supinated and paralytic d/c on 12/10  - Diurese with goal CVP of 4

## 2018-12-10 NOTE — HOSPITAL COURSE
Transferred to MICU on 12/9 intubated, paralyzed, and sedated. Prone positioning for P:F 54. Supinated on 12/10 at 0930, P:F 200. Paralytic discontinued with severe agitation on fentanyl, versed, precedex, and dilaudid pushes. Low dose propofol used for a short time, then changed to ketamine. Vent weaned according to ARDSnet protocol. Bipolar meds restarted on 12/11. Diuresed aggressively with electrolyte replacement. Versed discontinued on 12/11. Extubated to comfort flow on 12/12 and oxygen requirements continued to improve. He is now on room air. Intermittent agitation requiring precedex infusion and sitter. Psych consulted for management of bipolar medications and regimen restarted. He began to exhibit signs of possible benzo or alcohol withdrawal on 12/14 concern for symptoms of alcohol withdrawal so benzo taper started with improvement in symptoms.

## 2018-12-11 PROBLEM — R76.8 HEPATITIS C ANTIBODY POSITIVE IN BLOOD: Status: ACTIVE | Noted: 2018-12-11

## 2018-12-11 LAB
ALBUMIN SERPL BCP-MCNC: 2 G/DL
ALLENS TEST: ABNORMAL
ALP SERPL-CCNC: 55 U/L
ALT SERPL W/O P-5'-P-CCNC: 48 U/L
ANION GAP SERPL CALC-SCNC: 8 MMOL/L
ANION GAP SERPL CALC-SCNC: 9 MMOL/L
ANION GAP SERPL CALC-SCNC: 9 MMOL/L
AST SERPL-CCNC: 41 U/L
BACTERIA SPEC AEROBE CULT: NO GROWTH
BASOPHILS # BLD AUTO: 0.01 K/UL
BASOPHILS NFR BLD: 0.1 %
BILIRUB SERPL-MCNC: 0.4 MG/DL
BUN SERPL-MCNC: 18 MG/DL
BUN SERPL-MCNC: 19 MG/DL
BUN SERPL-MCNC: 22 MG/DL
CALCIUM SERPL-MCNC: 8.1 MG/DL
CALCIUM SERPL-MCNC: 8.3 MG/DL
CALCIUM SERPL-MCNC: 8.8 MG/DL
CHLORIDE SERPL-SCNC: 100 MMOL/L
CHLORIDE SERPL-SCNC: 97 MMOL/L
CHLORIDE SERPL-SCNC: 99 MMOL/L
CO2 SERPL-SCNC: 31 MMOL/L
CO2 SERPL-SCNC: 31 MMOL/L
CO2 SERPL-SCNC: 34 MMOL/L
CREAT SERPL-MCNC: 0.6 MG/DL
CREAT SERPL-MCNC: 0.6 MG/DL
CREAT SERPL-MCNC: 0.8 MG/DL
DELSYS: ABNORMAL
DIFFERENTIAL METHOD: ABNORMAL
ENTEROVIRUS: NOT DETECTED
EOSINOPHIL # BLD AUTO: 0 K/UL
EOSINOPHIL NFR BLD: 0 %
ERYTHROCYTE [DISTWIDTH] IN BLOOD BY AUTOMATED COUNT: 12.8 %
ERYTHROCYTE [SEDIMENTATION RATE] IN BLOOD BY WESTERGREN METHOD: 22 MM/H
ERYTHROCYTE [SEDIMENTATION RATE] IN BLOOD BY WESTERGREN METHOD: 22 MM/H
ERYTHROCYTE [SEDIMENTATION RATE] IN BLOOD BY WESTERGREN METHOD: 28 MM/H
ERYTHROCYTE [SEDIMENTATION RATE] IN BLOOD BY WESTERGREN METHOD: 28 MM/H
ERYTHROCYTE [SEDIMENTATION RATE] IN BLOOD BY WESTERGREN METHOD: 32 MM/H
EST. GFR  (AFRICAN AMERICAN): >60 ML/MIN/1.73 M^2
EST. GFR  (NON AFRICAN AMERICAN): >60 ML/MIN/1.73 M^2
FIO2: 30
FIO2: 35
FIO2: 35
FIO2: 40
FIO2: 40
GLUCOSE SERPL-MCNC: 155 MG/DL
GLUCOSE SERPL-MCNC: 165 MG/DL
GLUCOSE SERPL-MCNC: 174 MG/DL
GRAM STN SPEC: NORMAL
HCO3 UR-SCNC: 28.9 MMOL/L (ref 24–28)
HCO3 UR-SCNC: 30.6 MMOL/L (ref 24–28)
HCO3 UR-SCNC: 32.2 MMOL/L (ref 24–28)
HCO3 UR-SCNC: 32.9 MMOL/L (ref 24–28)
HCO3 UR-SCNC: 33.3 MMOL/L (ref 24–28)
HCO3 UR-SCNC: 34 MMOL/L (ref 24–28)
HCT VFR BLD AUTO: 28.9 %
HGB BLD-MCNC: 9.4 G/DL
HUMAN BOCAVIRUS: NOT DETECTED
HUMAN CORONAVIRUS, COMMON COLD VIRUS: NOT DETECTED
IMM GRANULOCYTES # BLD AUTO: 0.07 K/UL
IMM GRANULOCYTES NFR BLD AUTO: 1 %
INFLUENZA A - H1N1-09: NOT DETECTED
LYMPHOCYTES # BLD AUTO: 0.5 K/UL
LYMPHOCYTES NFR BLD: 7.5 %
MAGNESIUM SERPL-MCNC: 2.1 MG/DL
MAGNESIUM SERPL-MCNC: 2.2 MG/DL
MAGNESIUM SERPL-MCNC: 2.4 MG/DL
MCH RBC QN AUTO: 30.5 PG
MCHC RBC AUTO-ENTMCNC: 32.5 G/DL
MCV RBC AUTO: 94 FL
MIN VOL: 12.5
MIN VOL: 9.39
MIN VOL: 9.92
MIN VOL: 9.95
MODE: ABNORMAL
MONOCYTES # BLD AUTO: 0.3 K/UL
MONOCYTES NFR BLD: 4.1 %
NEUTROPHILS # BLD AUTO: 6.2 K/UL
NEUTROPHILS NFR BLD: 87.3 %
NRBC BLD-RTO: 0 /100 WBC
PARAINFLUENZA: NOT DETECTED
PCO2 BLDA: 39.5 MMHG (ref 35–45)
PCO2 BLDA: 41.2 MMHG (ref 35–45)
PCO2 BLDA: 44.2 MMHG (ref 35–45)
PCO2 BLDA: 44.6 MMHG (ref 35–45)
PCO2 BLDA: 46.3 MMHG (ref 35–45)
PCO2 BLDA: 47.3 MMHG (ref 35–45)
PEEP: 10
PEEP: 14
PEEP: 14
PEEP: 8
PEEP: 8
PH SMN: 7.45 [PH] (ref 7.35–7.45)
PH SMN: 7.46 [PH] (ref 7.35–7.45)
PH SMN: 7.47 [PH] (ref 7.35–7.45)
PH SMN: 7.48 [PH] (ref 7.35–7.45)
PH SMN: 7.48 [PH] (ref 7.35–7.45)
PH SMN: 7.49 [PH] (ref 7.35–7.45)
PHOSPHATE SERPL-MCNC: 2.1 MG/DL
PHOSPHATE SERPL-MCNC: 2.4 MG/DL
PHOSPHATE SERPL-MCNC: 3.4 MG/DL
PIP: 28
PIP: 33
PIP: 34
PIP: 40
PLATELET # BLD AUTO: 161 K/UL
PMV BLD AUTO: 9.9 FL
PO2 BLDA: 114 MMHG (ref 80–100)
PO2 BLDA: 56 MMHG (ref 80–100)
PO2 BLDA: 70 MMHG (ref 80–100)
PO2 BLDA: 75 MMHG (ref 80–100)
PO2 BLDA: 85 MMHG (ref 80–100)
PO2 BLDA: 94 MMHG (ref 80–100)
POC BE: 10 MMOL/L
POC BE: 10 MMOL/L
POC BE: 5 MMOL/L
POC BE: 7 MMOL/L
POC BE: 8 MMOL/L
POC BE: 9 MMOL/L
POC SATURATED O2: 90 % (ref 95–100)
POC SATURATED O2: 95 % (ref 95–100)
POC SATURATED O2: 96 % (ref 95–100)
POC SATURATED O2: 97 % (ref 95–100)
POC SATURATED O2: 98 % (ref 95–100)
POC SATURATED O2: 99 % (ref 95–100)
POC TCO2: 30 MMOL/L (ref 23–27)
POC TCO2: 32 MMOL/L (ref 23–27)
POC TCO2: 34 MMOL/L (ref 23–27)
POC TCO2: 34 MMOL/L (ref 23–27)
POC TCO2: 35 MMOL/L (ref 23–27)
POC TCO2: 35 MMOL/L (ref 23–27)
POCT GLUCOSE: 136 MG/DL (ref 70–110)
POCT GLUCOSE: 141 MG/DL (ref 70–110)
POCT GLUCOSE: 161 MG/DL (ref 70–110)
POCT GLUCOSE: 165 MG/DL (ref 70–110)
POCT GLUCOSE: 170 MG/DL (ref 70–110)
POTASSIUM SERPL-SCNC: 3.7 MMOL/L
POTASSIUM SERPL-SCNC: 3.9 MMOL/L
POTASSIUM SERPL-SCNC: 4 MMOL/L
PROT SERPL-MCNC: 5.5 G/DL
RBC # BLD AUTO: 3.08 M/UL
RVP - ADENOVIRUS: NOT DETECTED
RVP - HUMAN METAPNEUMOVIRUS (HMPV): NOT DETECTED
RVP - INFLUENZA A: NOT DETECTED
RVP - INFLUENZA B: NOT DETECTED
RVP - RESPIRATORY SYNCTIAL VIRUS (RSV) A: NOT DETECTED
RVP - RESPIRATORY VIRAL PANEL, SOURCE: NORMAL
RVP - RHINOVIRUS: NOT DETECTED
SAMPLE: ABNORMAL
SITE: ABNORMAL
SODIUM SERPL-SCNC: 139 MMOL/L
SODIUM SERPL-SCNC: 139 MMOL/L
SODIUM SERPL-SCNC: 140 MMOL/L
SP02: 92
SP02: 93
SP02: 96
SP02: 97
VANCOMYCIN TROUGH SERPL-MCNC: 7 UG/ML
VT: 380
WBC # BLD AUTO: 7.11 K/UL

## 2018-12-11 PROCEDURE — 80048 BASIC METABOLIC PNL TOTAL CA: CPT

## 2018-12-11 PROCEDURE — S0028 INJECTION, FAMOTIDINE, 20 MG: HCPCS | Performed by: STUDENT IN AN ORGANIZED HEALTH CARE EDUCATION/TRAINING PROGRAM

## 2018-12-11 PROCEDURE — 25000003 PHARM REV CODE 250: Performed by: INTERNAL MEDICINE

## 2018-12-11 PROCEDURE — 83735 ASSAY OF MAGNESIUM: CPT | Mod: 91

## 2018-12-11 PROCEDURE — 82803 BLOOD GASES ANY COMBINATION: CPT

## 2018-12-11 PROCEDURE — 80202 ASSAY OF VANCOMYCIN: CPT

## 2018-12-11 PROCEDURE — 63600175 PHARM REV CODE 636 W HCPCS: Performed by: NURSE PRACTITIONER

## 2018-12-11 PROCEDURE — 83735 ASSAY OF MAGNESIUM: CPT

## 2018-12-11 PROCEDURE — 37799 UNLISTED PX VASCULAR SURGERY: CPT

## 2018-12-11 PROCEDURE — 84100 ASSAY OF PHOSPHORUS: CPT | Mod: 91

## 2018-12-11 PROCEDURE — 80053 COMPREHEN METABOLIC PANEL: CPT

## 2018-12-11 PROCEDURE — 25000003 PHARM REV CODE 250: Performed by: NURSE PRACTITIONER

## 2018-12-11 PROCEDURE — 99900026 HC AIRWAY MAINTENANCE (STAT)

## 2018-12-11 PROCEDURE — 99292 CRITICAL CARE ADDL 30 MIN: CPT | Mod: ,,, | Performed by: INTERNAL MEDICINE

## 2018-12-11 PROCEDURE — 99900035 HC TECH TIME PER 15 MIN (STAT)

## 2018-12-11 PROCEDURE — 99291 CRITICAL CARE FIRST HOUR: CPT | Mod: ,,, | Performed by: NURSE PRACTITIONER

## 2018-12-11 PROCEDURE — 85025 COMPLETE CBC W/AUTO DIFF WBC: CPT

## 2018-12-11 PROCEDURE — 25000003 PHARM REV CODE 250: Performed by: STUDENT IN AN ORGANIZED HEALTH CARE EDUCATION/TRAINING PROGRAM

## 2018-12-11 PROCEDURE — 63600175 PHARM REV CODE 636 W HCPCS: Performed by: STUDENT IN AN ORGANIZED HEALTH CARE EDUCATION/TRAINING PROGRAM

## 2018-12-11 PROCEDURE — 94761 N-INVAS EAR/PLS OXIMETRY MLT: CPT

## 2018-12-11 PROCEDURE — 84100 ASSAY OF PHOSPHORUS: CPT

## 2018-12-11 PROCEDURE — 20000000 HC ICU ROOM

## 2018-12-11 PROCEDURE — 27200966 HC CLOSED SUCTION SYSTEM

## 2018-12-11 PROCEDURE — 94003 VENT MGMT INPAT SUBQ DAY: CPT

## 2018-12-11 PROCEDURE — 87522 HEPATITIS C REVRS TRNSCRPJ: CPT

## 2018-12-11 RX ORDER — BUPROPION HYDROCHLORIDE 75 MG/1
150 TABLET ORAL 2 TIMES DAILY
Status: DISCONTINUED | OUTPATIENT
Start: 2018-12-11 | End: 2018-12-12

## 2018-12-11 RX ORDER — FUROSEMIDE 10 MG/ML
40 INJECTION INTRAMUSCULAR; INTRAVENOUS ONCE
Status: COMPLETED | OUTPATIENT
Start: 2018-12-11 | End: 2018-12-11

## 2018-12-11 RX ORDER — SODIUM,POTASSIUM PHOSPHATES 280-250MG
2 POWDER IN PACKET (EA) ORAL
Status: DISCONTINUED | OUTPATIENT
Start: 2018-12-11 | End: 2018-12-15

## 2018-12-11 RX ORDER — GABAPENTIN 250 MG/5ML
600 SOLUTION ORAL EVERY 8 HOURS
Status: DISCONTINUED | OUTPATIENT
Start: 2018-12-11 | End: 2018-12-12

## 2018-12-11 RX ORDER — AMITRIPTYLINE HYDROCHLORIDE 25 MG/1
50 TABLET, FILM COATED ORAL NIGHTLY
Status: DISCONTINUED | OUTPATIENT
Start: 2018-12-11 | End: 2018-12-12

## 2018-12-11 RX ORDER — OLANZAPINE 7.5 MG/1
15 TABLET ORAL NIGHTLY
Status: DISCONTINUED | OUTPATIENT
Start: 2018-12-11 | End: 2018-12-12

## 2018-12-11 RX ORDER — SODIUM,POTASSIUM PHOSPHATES 280-250MG
2 POWDER IN PACKET (EA) ORAL ONCE
Status: COMPLETED | OUTPATIENT
Start: 2018-12-11 | End: 2018-12-11

## 2018-12-11 RX ADMIN — PIPERACILLIN AND TAZOBACTAM 4.5 G: 4; .5 INJECTION, POWDER, LYOPHILIZED, FOR SOLUTION INTRAVENOUS; PARENTERAL at 03:12

## 2018-12-11 RX ADMIN — METHYLPREDNISOLONE SODIUM SUCCINATE 80 MG: 125 INJECTION, POWDER, FOR SOLUTION INTRAMUSCULAR; INTRAVENOUS at 09:12

## 2018-12-11 RX ADMIN — MINERAL OIL AND WHITE PETROLATUM: 150; 830 OINTMENT OPHTHALMIC at 05:12

## 2018-12-11 RX ADMIN — PIPERACILLIN AND TAZOBACTAM 4.5 G: 4; .5 INJECTION, POWDER, LYOPHILIZED, FOR SOLUTION INTRAVENOUS; PARENTERAL at 06:12

## 2018-12-11 RX ADMIN — OLANZAPINE 15 MG: 7.5 TABLET ORAL at 10:12

## 2018-12-11 RX ADMIN — DEXMEDETOMIDINE HYDROCHLORIDE 1.4 MCG/KG/HR: 100 INJECTION, SOLUTION, CONCENTRATE INTRAVENOUS at 10:12

## 2018-12-11 RX ADMIN — FUROSEMIDE 40 MG: 10 INJECTION, SOLUTION INTRAMUSCULAR; INTRAVENOUS at 08:12

## 2018-12-11 RX ADMIN — OSELTAMIVIR PHOSPHATE 75 MG: 6 POWDER, FOR SUSPENSION ORAL at 08:12

## 2018-12-11 RX ADMIN — POTASSIUM CHLORIDE 40 MEQ: 20 SOLUTION ORAL at 02:12

## 2018-12-11 RX ADMIN — AMITRIPTYLINE HYDROCHLORIDE 50 MG: 25 TABLET, FILM COATED ORAL at 11:12

## 2018-12-11 RX ADMIN — FUROSEMIDE 40 MG: 10 INJECTION, SOLUTION INTRAMUSCULAR; INTRAVENOUS at 11:12

## 2018-12-11 RX ADMIN — POTASSIUM CHLORIDE 40 MEQ: 20 SOLUTION ORAL at 12:12

## 2018-12-11 RX ADMIN — GABAPENTIN 600 MG: 250 SOLUTION ORAL at 10:12

## 2018-12-11 RX ADMIN — METHYLPREDNISOLONE SODIUM SUCCINATE 80 MG: 125 INJECTION, POWDER, FOR SOLUTION INTRAMUSCULAR; INTRAVENOUS at 02:12

## 2018-12-11 RX ADMIN — VANCOMYCIN HYDROCHLORIDE 1250 MG: 10 INJECTION, POWDER, LYOPHILIZED, FOR SOLUTION INTRAVENOUS at 08:12

## 2018-12-11 RX ADMIN — DEXMEDETOMIDINE HYDROCHLORIDE 1.4 MCG/KG/HR: 100 INJECTION, SOLUTION, CONCENTRATE INTRAVENOUS at 12:12

## 2018-12-11 RX ADMIN — HYDROMORPHONE HYDROCHLORIDE 1 MG: 1 INJECTION, SOLUTION INTRAMUSCULAR; INTRAVENOUS; SUBCUTANEOUS at 04:12

## 2018-12-11 RX ADMIN — POTASSIUM & SODIUM PHOSPHATES POWDER PACK 280-160-250 MG 2 PACKET: 280-160-250 PACK at 07:12

## 2018-12-11 RX ADMIN — POTASSIUM & SODIUM PHOSPHATES POWDER PACK 280-160-250 MG 2 PACKET: 280-160-250 PACK at 11:12

## 2018-12-11 RX ADMIN — FAMOTIDINE 20 MG: 10 INJECTION, SOLUTION INTRAVENOUS at 09:12

## 2018-12-11 RX ADMIN — GABAPENTIN 600 MG: 250 SOLUTION ORAL at 02:12

## 2018-12-11 RX ADMIN — CHLORHEXIDINE GLUCONATE 0.12% ORAL RINSE 15 ML: 1.2 LIQUID ORAL at 09:12

## 2018-12-11 RX ADMIN — Medication 100 MG: at 08:12

## 2018-12-11 RX ADMIN — FUROSEMIDE 40 MG: 10 INJECTION, SOLUTION INTRAMUSCULAR; INTRAVENOUS at 04:12

## 2018-12-11 RX ADMIN — KETAMINE HYDROCHLORIDE 15 MCG/KG/MIN: 50 INJECTION, SOLUTION INTRAMUSCULAR; INTRAVENOUS at 09:12

## 2018-12-11 RX ADMIN — DEXMEDETOMIDINE HYDROCHLORIDE 1.4 MCG/KG/HR: 100 INJECTION, SOLUTION, CONCENTRATE INTRAVENOUS at 02:12

## 2018-12-11 RX ADMIN — Medication 0.02 MCG/KG/MIN: at 05:12

## 2018-12-11 RX ADMIN — AZITHROMYCIN MONOHYDRATE 500 MG: 500 INJECTION, POWDER, LYOPHILIZED, FOR SOLUTION INTRAVENOUS at 08:12

## 2018-12-11 RX ADMIN — POTASSIUM & SODIUM PHOSPHATES POWDER PACK 280-160-250 MG 2 PACKET: 280-160-250 PACK at 12:12

## 2018-12-11 RX ADMIN — DEXMEDETOMIDINE HYDROCHLORIDE 1.4 MCG/KG/HR: 100 INJECTION, SOLUTION, CONCENTRATE INTRAVENOUS at 05:12

## 2018-12-11 RX ADMIN — BUPROPION HYDROCHLORIDE 150 MG: 75 TABLET, FILM COATED ORAL at 11:12

## 2018-12-11 RX ADMIN — GABAPENTIN 600 MG: 250 SOLUTION ORAL at 09:12

## 2018-12-11 RX ADMIN — PIPERACILLIN AND TAZOBACTAM 4.5 G: 4; .5 INJECTION, POWDER, LYOPHILIZED, FOR SOLUTION INTRAVENOUS; PARENTERAL at 11:12

## 2018-12-11 RX ADMIN — POTASSIUM & SODIUM PHOSPHATES POWDER PACK 280-160-250 MG 2 PACKET: 280-160-250 PACK at 04:12

## 2018-12-11 RX ADMIN — BUPROPION HYDROCHLORIDE 150 MG: 75 TABLET, FILM COATED ORAL at 09:12

## 2018-12-11 RX ADMIN — FAMOTIDINE 20 MG: 10 INJECTION, SOLUTION INTRAVENOUS at 08:12

## 2018-12-11 RX ADMIN — FOLIC ACID 1 MG: 1 TABLET ORAL at 08:12

## 2018-12-11 RX ADMIN — ENOXAPARIN SODIUM 40 MG: 100 INJECTION SUBCUTANEOUS at 04:12

## 2018-12-11 RX ADMIN — POTASSIUM & SODIUM PHOSPHATES POWDER PACK 280-160-250 MG 2 PACKET: 280-160-250 PACK at 05:12

## 2018-12-11 RX ADMIN — METHYLPREDNISOLONE SODIUM SUCCINATE 80 MG: 125 INJECTION, POWDER, FOR SOLUTION INTRAMUSCULAR; INTRAVENOUS at 05:12

## 2018-12-11 NOTE — ASSESSMENT & PLAN NOTE
- viral vs bacterial   - viral panel negative   - covering with vanc, zosyn, and azithro  - tamiflu d/c   - IV solumedrol

## 2018-12-11 NOTE — PLAN OF CARE
Problem: Adult Inpatient Plan of Care  Goal: Plan of Care Review  Outcome: Ongoing (interventions implemented as appropriate)   See vital signs and assessments in flowsheets. See below for updates on today's progress.     Pulmonary: pt was placed supine today at 0930. Nimbex paralytic off at 1425. Pt remains intubated on AC mode 32/380/35/8. ABGs Q4h.    Cardiovascular: SR-ST with HR ranging from 80s-130s. BP labile, SBP gets up to 170s when agitated.     Neurological: opens eyes to pain/voice. remains agitated, moves all extremities spontaneously with BUE and BLE restraints for safety precautions.     Gastrointestinal: OGT at 55cm lip    Genitourinary: Neri with good urine response to Lasix IVP      Endocrine: BG wnl    Integumentary/Other: Mepilex and heel foam dressing in place    Infusions: Versed, Fentanyl, Precedex, Propofol, Levophed, and antibiotics    Patient progressing towards goals as tolerated, plan of care communicated and reviewed with Amanda Hodges and pt's aunt with all questions and concerns addressed. Will continue to monitor.

## 2018-12-11 NOTE — NURSING
Critical care service Janessa,NP and Feliz NP at bedside, aware that pt's is very agitated, attempting to pull out ET tube. At 1630, pt was started on precedex and was given Dilaudid 1mg PRN, versed and fentanyl gtts were increased. Despite this, pt continued to be very agitated and needed multiple people to hold and calm him down. Per NP, BUE restraints were started and stated okay to start propofol gtt at low dose infusion and to restart Levophed gtt to maintain MAP goal >65mmHg. Will continue to monitor patient very closely.

## 2018-12-11 NOTE — SUBJECTIVE & OBJECTIVE
Interval History/Significant Events: Agitation persistent, but better over night with ketamine. P:F 225    Review of Systems   Unable to perform ROS: Intubated     Objective:     Vital Signs (Most Recent):  Temp: 98.4 °F (36.9 °C) (12/11/18 0700)  Pulse: 72 (12/11/18 1319)  Resp: (!) 28 (12/11/18 1319)  BP: 130/79 (12/11/18 0400)  SpO2: (!) 83 % (12/11/18 1319) Vital Signs (24h Range):  Temp:  [98 °F (36.7 °C)-100.1 °F (37.8 °C)] 98.4 °F (36.9 °C)  Pulse:  [] 72  Resp:  [12-50] 28  SpO2:  [73 %-100 %] 83 %  BP: (101-169)/() 130/79  Arterial Line BP: ()/(48-97) 94/55   Weight: 74.4 kg (164 lb)  Body mass index is 22.87 kg/m².      Intake/Output Summary (Last 24 hours) at 12/11/2018 1340  Last data filed at 12/11/2018 1300  Gross per 24 hour   Intake 3626.83 ml   Output 7500 ml   Net -3873.17 ml       Physical Exam   Constitutional: He appears well-developed and well-nourished. No distress. He is sedated, intubated and restrained.   HENT:   Head: Normocephalic and atraumatic.   Eyes: Pupils are equal, round, and reactive to light. No scleral icterus.   Periorbital edema   Neck: No JVD present. No tracheal deviation present.   Cardiovascular: Normal rate, regular rhythm and normal heart sounds.   Pulses:       Radial pulses are 2+ on the right side, and 2+ on the left side.        Dorsalis pedis pulses are 2+ on the right side, and 2+ on the left side.   Pulmonary/Chest: Effort normal and breath sounds normal. He is intubated. He has no wheezes. He has no rhonchi. He has no rales.   Abdominal: Soft. He exhibits no distension. Bowel sounds are decreased.   Genitourinary:   Genitourinary Comments: Neri with clear yellow output    Neurological:   Patient sedated on ketamine, precedex, versed, and fentanyl. PERRL 2mm beau. Moves all extremities purposefully, does not follow commands. Easily agitated.     Skin: Skin is warm and dry. Capillary refill takes 2 to 3 seconds. No rash noted. He is not  diaphoretic. No erythema.   Nursing note and vitals reviewed.      Vents:  Vent Mode: A/C (12/11/18 1319)  Ventilator Initiated: Yes (12/09/18 0200)  Set Rate: 28 bmp (12/11/18 1319)  Vt Set: 380 mL (12/11/18 1319)  Pressure Support: 0 cmH20 (12/11/18 1319)  PEEP/CPAP: 5 cmH20 (12/11/18 1319)  Oxygen Concentration (%): 30 (12/11/18 1319)  Peak Airway Pressure: 30 cmH2O (12/11/18 1319)  Plateau Pressure: 23 cmH20 (12/11/18 1319)  Total Ve: 9.96 mL (12/11/18 1319)  F/VT Ratio<105 (RSBI): (!) 82.11 (12/11/18 1319)  Lines/Drains/Airways     Central Venous Catheter Line                 Percutaneous Central Line Insertion/Assessment - triple lumen  12/08/18 0525 right internal jugular 3 days          Drain                 NG/OG Tube 12/09/18 0528 [place] sump 14 Fr. Right mouth 2 days         Urethral Catheter 12/09/18 0523 Latex 18 Fr. 2 days          Airway                 Airway - Non-Surgical 12/09/18 0530 Endotracheal Tube 2 days          Arterial Line                 Arterial Line 12/08/18 0521 Left Radial 3 days          Peripheral Intravenous Line                 Peripheral IV - Single Lumen 12/08/18 0522 Left Hand 3 days         Peripheral IV - Single Lumen 12/08/18 0523 Left Antecubital 3 days         Peripheral IV - Single Lumen 12/08/18 0532 Right Hand 3 days              Significant Labs:    CBC/Anemia Profile:  Recent Labs   Lab 12/10/18  0229 12/11/18  0320   WBC 11.32 7.11   HGB 10.3* 9.4*   HCT 32.1* 28.9*   * 161   MCV 99* 94   RDW 12.8 12.8        Chemistries:  Recent Labs   Lab 12/10/18  0229  12/10/18  2137 12/11/18  0320 12/11/18  1111      < > 138 139 139   K 4.2   < > 3.4* 3.9 3.7      < > 99 100 99   CO2 27   < > 32* 31* 31*   BUN 11   < > 16 18 19   CREATININE 0.6   < > 0.7 0.6 0.6   CALCIUM 8.3*   < > 8.1* 8.1* 8.3*   ALBUMIN 2.2*  --   --  2.0*  --    PROT 6.1  --   --  5.5*  --    BILITOT 0.3  --   --  0.4  --    ALKPHOS 59  --   --  55  --    ALT 50*  --   --  48*  --    AST  59*  --   --  41*  --    MG 2.1   < > 1.8 2.4 2.1   PHOS 1.1*   < > <1.0* 2.4* 2.1*    < > = values in this interval not displayed.       All pertinent labs within the past 24 hours have been reviewed.    Significant Imaging:  I have reviewed all pertinent imaging results/findings within the past 24 hours.

## 2018-12-11 NOTE — ASSESSMENT & PLAN NOTE
- AST of 159 and ALT of 59; suggestive alcoholic etiology.   - EtOH <10  - Thiamine and folate replacement  - Abdominal ultrasound pending  - CIWA-Ar once sedation removed\  - AST and ALT improved to 40's

## 2018-12-11 NOTE — PROGRESS NOTES
[facility]  Critical Care Medicine  Progress Note    Patient Name: Ashwin Madrid  MRN: [id number]  Admission Date: 12/9/2018  Hospital Length of Stay: 2 days  Code Status: Full Code  Attending Provider: Jv Nicole*  Primary Care Provider: Primary Doctor No   Principal Problem: ARDS (adult respiratory distress syndrome)    Subjective:     HPI:  Mr Madrid is a 37 yo M w PMHx significant for COPD (nil PFT), bipolar disorder, ADHD, polysubstance abuse and GERD who was transferred to [facility] from [facility] d/t concern for ARDS requiring proning.     As pt was intubated on arrival, the majority of the history was collected from transfer paperwork and chart review. Pt presented to OSH with 3-day history of progressively worsening cough, fever and shortness of breath with associated decrease in PO intake and urine output. On admission, he denied IVDU and stated that he had abstained from his drug of choice (methamphetamine). On admission to OSH, he was noted to have leukocytosis with WBC of 21.4, fever of 102.7F, tachycardia of 127, tachypnea of 41 and hypoxemia with SpO2 of 84%. Imaging studies revealed RLL infiltrate concerning for community-acquired pneumonia.     Over the course of his 24 hours at OSH, his supplementary oxygen requirements steadily increased from nasal cannula to nonrebreather to BiPAP to ultimately intubation. Repeat CXR revealed development of bilateral infiltrates concerning for ARDS; MxP8-[id number]-MbG8 ratio was 54 and he was paralyzed with intermittent rocuronium.     Labs at OSH also revealed a transaminitis with AST of 186 and ALT of 48.     Hospital/ICU Course:  Transferred to MICU on 12/9 intubated, paralyzed, and sedated. Prone positioning for P:F 54. Supinated on 12/10 at 0930, P:F 200. Paralytic discontinued with severe agitation on fentanyl, versed, precedex, and dilaudid pushes. Low dose propofol used for a short time, then changed to ketamine. Weaning  vent according to ARDSnet protocol. Bipolar meds restarted. Diuresing aggressively and replacing lytes accordingly.         Interval History/Significant Events: Agitation persistent, but better over night with ketamine. P:F 225    Review of Systems   Unable to perform ROS: Intubated     Objective:     Vital Signs (Most Recent):  Temp: 98.4 °F (36.9 °C) (12/11/18 0700)  Pulse: 72 (12/11/18 1319)  Resp: (!) 28 (12/11/18 1319)  BP: 130/79 (12/11/18 0400)  SpO2: (!) 83 % (12/11/18 1319) Vital Signs (24h Range):  Temp:  [98 °F (36.7 °C)-100.1 °F (37.8 °C)] 98.4 °F (36.9 °C)  Pulse:  [] 72  Resp:  [12-50] 28  SpO2:  [73 %-100 %] 83 %  BP: (101-169)/() 130/79  Arterial Line BP: ()/(48-97) 94/55   Weight: 74.4 kg (164 lb)  Body mass index is 22.87 kg/m².      Intake/Output Summary (Last 24 hours) at 12/11/2018 1340  Last data filed at 12/11/2018 1300  Gross per 24 hour   Intake 3626.83 ml   Output 7500 ml   Net -3873.17 ml       Physical Exam   Constitutional: He appears well-developed and well-nourished. No distress. He is sedated, intubated and restrained.   HENT:   Head: Normocephalic and atraumatic.   Eyes: Pupils are equal, round, and reactive to light. No scleral icterus.   Periorbital edema   Neck: No JVD present. No tracheal deviation present.   Cardiovascular: Normal rate, regular rhythm and normal heart sounds.   Pulses:       Radial pulses are 2+ on the right side, and 2+ on the left side.        Dorsalis pedis pulses are 2+ on the right side, and 2+ on the left side.   Pulmonary/Chest: Effort normal and breath sounds normal. He is intubated. He has no wheezes. He has no rhonchi. He has no rales.   Abdominal: Soft. He exhibits no distension. Bowel sounds are decreased.   Genitourinary:   Genitourinary Comments: Neri with clear yellow output    Neurological:   Patient sedated on ketamine, precedex, versed, and fentanyl. PERRL 2mm beau. Moves all extremities purposefully, does not follow commands.  Easily agitated.     Skin: Skin is warm and dry. Capillary refill takes 2 to 3 seconds. No rash noted. He is not diaphoretic. No erythema.   Nursing note and vitals reviewed.      Vents:  Vent Mode: A/C (12/11/18 1319)  Ventilator Initiated: Yes (12/09/18 0200)  Set Rate: 28 bmp (12/11/18 1319)  Vt Set: 380 mL (12/11/18 1319)  Pressure Support: 0 cmH20 (12/11/18 1319)  PEEP/CPAP: 5 cmH20 (12/11/18 1319)  Oxygen Concentration (%): 30 (12/11/18 1319)  Peak Airway Pressure: 30 cmH2O (12/11/18 1319)  Plateau Pressure: 23 cmH20 (12/11/18 1319)  Total Ve: 9.96 mL (12/11/18 1319)  F/VT Ratio<105 (RSBI): (!) 82.11 (12/11/18 1319)  Lines/Drains/Airways     Central Venous Catheter Line                 Percutaneous Central Line Insertion/Assessment - triple lumen  12/08/18 0525 right internal jugular 3 days          Drain                 NG/OG Tube 12/09/18 0528 [place] sump 14 Fr. Right mouth 2 days         Urethral Catheter 12/09/18 0523 Latex 18 Fr. 2 days          Airway                 Airway - Non-Surgical 12/09/18 0530 Endotracheal Tube 2 days          Arterial Line                 Arterial Line 12/08/18 0521 Left Radial 3 days          Peripheral Intravenous Line                 Peripheral IV - Single Lumen 12/08/18 0522 Left Hand 3 days         Peripheral IV - Single Lumen 12/08/18 0523 Left Antecubital 3 days         Peripheral IV - Single Lumen 12/08/18 0532 Right Hand 3 days              Significant Labs:    CBC/Anemia Profile:  Recent Labs   Lab 12/10/18  0229 12/11/18  0320   WBC 11.32 7.11   HGB 10.3* 9.4*   HCT 32.1* 28.9*   * 161   MCV 99* 94   RDW 12.8 12.8        Chemistries:  Recent Labs   Lab 12/10/18  0229  12/10/18  2137 12/11/18  0320 12/11/18  1111      < > 138 139 139   K 4.2   < > 3.4* 3.9 3.7      < > 99 100 99   CO2 27   < > 32* 31* 31*   BUN 11   < > 16 18 19   CREATININE 0.6   < > 0.7 0.6 0.6   CALCIUM 8.3*   < > 8.1* 8.1* 8.3*   ALBUMIN 2.2*  --   --  2.0*  --    PROT 6.1  --    --  5.5*  --    BILITOT 0.3  --   --  0.4  --    ALKPHOS 59  --   --  55  --    ALT 50*  --   --  48*  --    AST 59*  --   --  41*  --    MG 2.1   < > 1.8 2.4 2.1   PHOS 1.1*   < > <1.0* 2.4* 2.1*    < > = values in this interval not displayed.       All pertinent labs within the past 24 hours have been reviewed.    Significant Imaging:  I have reviewed all pertinent imaging results/findings within the past 24 hours.    Assessment/Plan:     Psychiatric   Anxiety    Home regimen includes propranolol and escitalopram    - Will hold while sedated     Bipolar 1 disorder    - QTc 452  - Home meds restarted except Cymbalta (unable to give through OG tube)      Pulmonary   * ARDS (adult respiratory distress syndrome)    - Likely 2/2 pneumonia; initial P:F 54  - abx, antiviral, and steroids  - ARDSnet Protocol  - Target 6-8ml/kg Ideal Body Weight.   - Plateau pressure goal <30cm H2O.  - Oxygenation goal PaO2 55-80 or SpO2 88-95%.  - Sedated, paralyzed, and proned on   - Improving - supinated and paralytic d/c on 12/10  - Aggressive diurese with goal CVP of 4       Pneumonia    - viral vs bacterial   - viral panel negative   - covering with vanc, zosyn, and azithro  - tamiflu d/c   - IV solumedrol     Cardiac/Vascular   Hypertriglyceridemia    - Lipase WNL  - Abdomen ultrasound  pending  - No propofol for sedation      Renal/   Respiratory acidosis    METABOLIC ACIDOSIS    Pt presents with ARDS and acute hypercapnic respiratory failure; AB.229 / 59.1 / 204 / 100%; bicarbonate of 21. Pt without diarrhea or other electrolyte abnormalities. Suspect iatrogenic hyperchloremic metabolic acidosis on top of respiratory acidosis.    - Permissive hypercapnia and pH goal >7.2      Immunology/Multi System   Hepatitis C antibody positive in blood    - Hep C workup ordered      GI   Transaminitis    - AST of 159 and ALT of 59; suggestive alcoholic etiology.   - EtOH <10  - Thiamine and folate replacement  - Abdominal ultrasound  pending  - RAYNE-Ar once sedation removed\  - AST and ALT improved to 40's         Other   Primary insomnia    Home regimen includes amitriptyline 10 QHS PRN and melatonin QHS     - Will hold while sedated        Critical Care Daily Checklist:    A: Awake: RASS Goal/Actual Goal: RASS Goal: (P) -2-->light sedation  Actual: Calix Agitation Sedation Scale (RASS): Light sedation   B: Spontaneous Breathing Trial Performed?  N/A   C: SAT & SBT Coordinated?  N/A                      D: Delirium: CAM-ICU Overall CAM-ICU: Positive   E: Early Mobility Performed? Yes   F: Feeding Goal:    Status:     Current Diet Order   Procedures    Diet NPO      AS: Analgesia/Sedation Ketamine, precedex. Fentanyl, & versed   T: Thromboembolic Prophylaxis lovenox   H: HOB > 300 Yes   U: Stress Ulcer Prophylaxis (if needed) pepcid   G: Glucose Control SS   B: Bowel Function  N/A   I: Indwelling Catheter (Lines & Neri) Necessity TLC, Neri   D: De-escalation of Antimicrobials/Pharmacotherapies N/A    Plan for the day/ETD Wean sedation and vent    Code Status:  Family/Goals of Care: Full Code       Critical Care Time: 60 minutes  Critical secondary to Patient has a condition that poses threat to life and bodily function: ARDS      Critical care was time spent personally by me on the following activities: development of treatment plan with patient or surrogate and bedside caregivers, discussions with consultants, evaluation of patient's response to treatment, examination of patient, ordering and performing treatments and interventions, ordering and review of laboratory studies, ordering and review of radiographic studies, pulse oximetry, re-evaluation of patient's condition. This critical care time did not overlap with that of any other provider or involve time for any procedures.     Amanda Piper, NP  Critical Care Medicine  [facility]

## 2018-12-11 NOTE — ASSESSMENT & PLAN NOTE
- Likely 2/2 pneumonia; initial P:F 54  - abx, antiviral, and steroids  - ARDSnet Protocol  - Target 6-8ml/kg Ideal Body Weight.   - Plateau pressure goal <30cm H2O.  - Oxygenation goal PaO2 55-80 or SpO2 88-95%.  - Sedated, paralyzed, and proned on 12/9  - Improving - supinated and paralytic d/c on 12/10  - Aggressive diurese with goal CVP of 4

## 2018-12-11 NOTE — PLAN OF CARE
Problem: Adult Inpatient Plan of Care  Goal: Plan of Care Review  Outcome: Ongoing (interventions implemented as appropriate)  No acute events throughout shift. Pt started on ketamine gtt at 5 mcg/kg/min. Weaned off propofol and weaning off versed and fentanyl gtts. Pt remains intubated. K, Phos, and Mag replaced. CVP 8-12. VS and assessment per flow sheet, patient progressing towards goals as tolerated, plan of care reviewed with Ashwin Madrid and family, all concerns addressed, will continue to monitor.

## 2018-12-12 PROBLEM — G93.41 ACUTE METABOLIC ENCEPHALOPATHY: Status: ACTIVE | Noted: 2018-12-12

## 2018-12-12 PROBLEM — J96.01 ACUTE HYPOXEMIC RESPIRATORY FAILURE: Status: ACTIVE | Noted: 2018-12-12

## 2018-12-12 LAB
ALBUMIN SERPL BCP-MCNC: 2.5 G/DL
ALBUMIN SERPL BCP-MCNC: 2.6 G/DL
ALLENS TEST: ABNORMAL
ALP SERPL-CCNC: 45 U/L
ALT SERPL W/O P-5'-P-CCNC: 70 U/L
ANION GAP SERPL CALC-SCNC: 6 MMOL/L
ANION GAP SERPL CALC-SCNC: 7 MMOL/L
AST SERPL-CCNC: 42 U/L
BASOPHILS # BLD AUTO: 0.02 K/UL
BASOPHILS NFR BLD: 0.2 %
BILIRUB SERPL-MCNC: 0.6 MG/DL
BUN SERPL-MCNC: 20 MG/DL
BUN SERPL-MCNC: 23 MG/DL
CALCIUM SERPL-MCNC: 8.4 MG/DL
CALCIUM SERPL-MCNC: 8.6 MG/DL
CHLORIDE SERPL-SCNC: 100 MMOL/L
CHLORIDE SERPL-SCNC: 102 MMOL/L
CO2 SERPL-SCNC: 31 MMOL/L
CO2 SERPL-SCNC: 34 MMOL/L
CREAT SERPL-MCNC: 0.7 MG/DL
CREAT SERPL-MCNC: 0.7 MG/DL
DELSYS: ABNORMAL
DIFFERENTIAL METHOD: ABNORMAL
EOSINOPHIL # BLD AUTO: 0 K/UL
EOSINOPHIL NFR BLD: 0 %
ERYTHROCYTE [DISTWIDTH] IN BLOOD BY AUTOMATED COUNT: 13.2 %
ERYTHROCYTE [SEDIMENTATION RATE] IN BLOOD BY WESTERGREN METHOD: 12 MM/H
EST. GFR  (AFRICAN AMERICAN): >60 ML/MIN/1.73 M^2
EST. GFR  (AFRICAN AMERICAN): >60 ML/MIN/1.73 M^2
EST. GFR  (NON AFRICAN AMERICAN): >60 ML/MIN/1.73 M^2
EST. GFR  (NON AFRICAN AMERICAN): >60 ML/MIN/1.73 M^2
GLUCOSE SERPL-MCNC: 124 MG/DL
GLUCOSE SERPL-MCNC: 90 MG/DL
HCO3 UR-SCNC: 32 MMOL/L (ref 24–28)
HCT VFR BLD AUTO: 29.9 %
HGB BLD-MCNC: 9.5 G/DL
IMM GRANULOCYTES # BLD AUTO: 0.15 K/UL
IMM GRANULOCYTES NFR BLD AUTO: 1.4 %
L PNEUMO AG UR QL IA: NOT DETECTED
LYMPHOCYTES # BLD AUTO: 0.9 K/UL
LYMPHOCYTES NFR BLD: 9 %
MAGNESIUM SERPL-MCNC: 1.5 MG/DL
MAGNESIUM SERPL-MCNC: 2.1 MG/DL
MCH RBC QN AUTO: 30.4 PG
MCHC RBC AUTO-ENTMCNC: 31.8 G/DL
MCV RBC AUTO: 96 FL
MODE: ABNORMAL
MONOCYTES # BLD AUTO: 0.9 K/UL
MONOCYTES NFR BLD: 9 %
NEUTROPHILS # BLD AUTO: 8.4 K/UL
NEUTROPHILS NFR BLD: 80.4 %
NRBC BLD-RTO: 0 /100 WBC
PCO2 BLDA: 41.8 MMHG (ref 35–45)
PEEP: 10
PH SMN: 7.49 [PH] (ref 7.35–7.45)
PHOSPHATE SERPL-MCNC: 1.9 MG/DL
PHOSPHATE SERPL-MCNC: 2.7 MG/DL
PHOSPHATE SERPL-MCNC: 2.7 MG/DL
PLATELET # BLD AUTO: 251 K/UL
PMV BLD AUTO: 9.6 FL
PO2 BLDA: 70 MMHG (ref 80–100)
POC BE: 9 MMOL/L
POC SATURATED O2: 95 % (ref 95–100)
POC TCO2: 33 MMOL/L (ref 23–27)
POCT GLUCOSE: 106 MG/DL (ref 70–110)
POCT GLUCOSE: 112 MG/DL (ref 70–110)
POCT GLUCOSE: 125 MG/DL (ref 70–110)
POTASSIUM SERPL-SCNC: 3.2 MMOL/L
POTASSIUM SERPL-SCNC: 3.6 MMOL/L
PROT SERPL-MCNC: 5.9 G/DL
RBC # BLD AUTO: 3.13 M/UL
SAMPLE: ABNORMAL
SITE: ABNORMAL
SODIUM SERPL-SCNC: 140 MMOL/L
SODIUM SERPL-SCNC: 140 MMOL/L
TRIGL SERPL-MCNC: 304 MG/DL
VT: 380
WBC # BLD AUTO: 10.39 K/UL

## 2018-12-12 PROCEDURE — 80053 COMPREHEN METABOLIC PANEL: CPT

## 2018-12-12 PROCEDURE — 84478 ASSAY OF TRIGLYCERIDES: CPT

## 2018-12-12 PROCEDURE — 84100 ASSAY OF PHOSPHORUS: CPT | Mod: 91

## 2018-12-12 PROCEDURE — 84100 ASSAY OF PHOSPHORUS: CPT

## 2018-12-12 PROCEDURE — S0028 INJECTION, FAMOTIDINE, 20 MG: HCPCS | Performed by: STUDENT IN AN ORGANIZED HEALTH CARE EDUCATION/TRAINING PROGRAM

## 2018-12-12 PROCEDURE — 25000003 PHARM REV CODE 250: Performed by: INTERNAL MEDICINE

## 2018-12-12 PROCEDURE — 63600175 PHARM REV CODE 636 W HCPCS

## 2018-12-12 PROCEDURE — 25000003 PHARM REV CODE 250: Performed by: NURSE PRACTITIONER

## 2018-12-12 PROCEDURE — 99900035 HC TECH TIME PER 15 MIN (STAT)

## 2018-12-12 PROCEDURE — 63600175 PHARM REV CODE 636 W HCPCS: Performed by: STUDENT IN AN ORGANIZED HEALTH CARE EDUCATION/TRAINING PROGRAM

## 2018-12-12 PROCEDURE — 37799 UNLISTED PX VASCULAR SURGERY: CPT

## 2018-12-12 PROCEDURE — 83735 ASSAY OF MAGNESIUM: CPT

## 2018-12-12 PROCEDURE — 63600175 PHARM REV CODE 636 W HCPCS: Performed by: NURSE PRACTITIONER

## 2018-12-12 PROCEDURE — 93010 ELECTROCARDIOGRAM REPORT: CPT | Mod: ,,, | Performed by: INTERNAL MEDICINE

## 2018-12-12 PROCEDURE — 99292 CRITICAL CARE ADDL 30 MIN: CPT | Mod: ,,, | Performed by: INTERNAL MEDICINE

## 2018-12-12 PROCEDURE — 94761 N-INVAS EAR/PLS OXIMETRY MLT: CPT

## 2018-12-12 PROCEDURE — 93005 ELECTROCARDIOGRAM TRACING: CPT

## 2018-12-12 PROCEDURE — 63600175 PHARM REV CODE 636 W HCPCS: Performed by: INTERNAL MEDICINE

## 2018-12-12 PROCEDURE — 25000003 PHARM REV CODE 250: Performed by: STUDENT IN AN ORGANIZED HEALTH CARE EDUCATION/TRAINING PROGRAM

## 2018-12-12 PROCEDURE — 20000000 HC ICU ROOM

## 2018-12-12 PROCEDURE — 99900017 HC EXTUBATION W/PARAMETERS (STAT)

## 2018-12-12 PROCEDURE — 99291 CRITICAL CARE FIRST HOUR: CPT | Mod: ,,, | Performed by: NURSE PRACTITIONER

## 2018-12-12 PROCEDURE — 85025 COMPLETE CBC W/AUTO DIFF WBC: CPT

## 2018-12-12 PROCEDURE — 80069 RENAL FUNCTION PANEL: CPT

## 2018-12-12 PROCEDURE — 99900026 HC AIRWAY MAINTENANCE (STAT)

## 2018-12-12 PROCEDURE — 83735 ASSAY OF MAGNESIUM: CPT | Mod: 91

## 2018-12-12 PROCEDURE — 82803 BLOOD GASES ANY COMBINATION: CPT

## 2018-12-12 RX ORDER — TOPIRAMATE 25 MG/1
25 TABLET ORAL 2 TIMES DAILY
Status: ON HOLD | COMMUNITY
End: 2018-12-16 | Stop reason: SDUPTHER

## 2018-12-12 RX ORDER — FUROSEMIDE 10 MG/ML
40 INJECTION INTRAMUSCULAR; INTRAVENOUS ONCE
Status: COMPLETED | OUTPATIENT
Start: 2018-12-12 | End: 2018-12-12

## 2018-12-12 RX ORDER — GABAPENTIN 300 MG/1
900 CAPSULE ORAL 3 TIMES DAILY
Status: ON HOLD | COMMUNITY
End: 2018-12-16 | Stop reason: SDUPTHER

## 2018-12-12 RX ORDER — AMITRIPTYLINE HYDROCHLORIDE 25 MG/1
100 TABLET, FILM COATED ORAL NIGHTLY PRN
Status: DISCONTINUED | OUTPATIENT
Start: 2018-12-12 | End: 2018-12-14

## 2018-12-12 RX ORDER — TAMSULOSIN HYDROCHLORIDE 0.4 MG/1
0.4 CAPSULE ORAL DAILY
Status: ON HOLD | COMMUNITY
End: 2018-12-16 | Stop reason: HOSPADM

## 2018-12-12 RX ORDER — HALOPERIDOL 5 MG/ML
2 INJECTION INTRAMUSCULAR EVERY 4 HOURS PRN
Status: DISCONTINUED | OUTPATIENT
Start: 2018-12-12 | End: 2018-12-13

## 2018-12-12 RX ORDER — LORAZEPAM 2 MG/ML
4 INJECTION INTRAMUSCULAR
Status: DISCONTINUED | OUTPATIENT
Start: 2018-12-12 | End: 2018-12-12

## 2018-12-12 RX ORDER — GABAPENTIN 300 MG/1
900 CAPSULE ORAL 3 TIMES DAILY
Status: DISCONTINUED | OUTPATIENT
Start: 2018-12-12 | End: 2018-12-16 | Stop reason: HOSPADM

## 2018-12-12 RX ORDER — FOLIC ACID 1 MG/1
1 TABLET ORAL DAILY
Status: DISCONTINUED | OUTPATIENT
Start: 2018-12-13 | End: 2018-12-16 | Stop reason: HOSPADM

## 2018-12-12 RX ORDER — MULTIVITAMIN
1 TABLET ORAL DAILY
Status: ON HOLD | COMMUNITY
End: 2018-12-16 | Stop reason: SDUPTHER

## 2018-12-12 RX ORDER — SULFAMETHOXAZOLE AND TRIMETHOPRIM 800; 160 MG/1; MG/1
1 TABLET ORAL
Status: ON HOLD | COMMUNITY
End: 2018-12-16 | Stop reason: HOSPADM

## 2018-12-12 RX ORDER — OMEPRAZOLE 20 MG/1
20 CAPSULE, DELAYED RELEASE ORAL EVERY MORNING
Status: ON HOLD | COMMUNITY
End: 2018-12-16 | Stop reason: SDUPTHER

## 2018-12-12 RX ORDER — ZIPRASIDONE HYDROCHLORIDE 80 MG/1
80 CAPSULE ORAL 2 TIMES DAILY WITH MEALS
Status: ON HOLD | COMMUNITY
End: 2018-12-16 | Stop reason: SDUPTHER

## 2018-12-12 RX ORDER — THIAMINE HCL 100 MG
100 TABLET ORAL DAILY
Status: DISCONTINUED | OUTPATIENT
Start: 2018-12-13 | End: 2018-12-16 | Stop reason: HOSPADM

## 2018-12-12 RX ORDER — TALC
3 POWDER (GRAM) TOPICAL NIGHTLY
Status: ON HOLD | COMMUNITY
End: 2018-12-16 | Stop reason: SDUPTHER

## 2018-12-12 RX ORDER — ZIPRASIDONE HYDROCHLORIDE 20 MG/1
80 CAPSULE ORAL 2 TIMES DAILY
Status: DISCONTINUED | OUTPATIENT
Start: 2018-12-12 | End: 2018-12-14

## 2018-12-12 RX ORDER — LORAZEPAM 2 MG/ML
2 INJECTION INTRAMUSCULAR
Status: DISCONTINUED | OUTPATIENT
Start: 2018-12-12 | End: 2018-12-12

## 2018-12-12 RX ORDER — LANOLIN ALCOHOL/MO/W.PET/CERES
5000 CREAM (GRAM) TOPICAL DAILY
COMMUNITY
End: 2020-08-01 | Stop reason: CLARIF

## 2018-12-12 RX ORDER — ESCITALOPRAM OXALATE 10 MG/1
10 TABLET ORAL DAILY
Status: DISCONTINUED | OUTPATIENT
Start: 2018-12-13 | End: 2018-12-16 | Stop reason: HOSPADM

## 2018-12-12 RX ORDER — BUPROPION HYDROCHLORIDE 75 MG/1
150 TABLET ORAL 2 TIMES DAILY
Status: COMPLETED | OUTPATIENT
Start: 2018-12-12 | End: 2018-12-12

## 2018-12-12 RX ORDER — LORAZEPAM 2 MG/ML
INJECTION INTRAMUSCULAR
Status: COMPLETED
Start: 2018-12-12 | End: 2018-12-12

## 2018-12-12 RX ORDER — FERROUS SULFATE 324(65)MG
325 TABLET, DELAYED RELEASE (ENTERIC COATED) ORAL DAILY
Status: ON HOLD | COMMUNITY
End: 2018-12-16 | Stop reason: SDUPTHER

## 2018-12-12 RX ORDER — FAMOTIDINE 20 MG/1
20 TABLET, FILM COATED ORAL 2 TIMES DAILY
Status: DISCONTINUED | OUTPATIENT
Start: 2018-12-12 | End: 2018-12-12

## 2018-12-12 RX ORDER — BUPROPION HYDROCHLORIDE 150 MG/1
450 TABLET ORAL DAILY
Status: DISCONTINUED | OUTPATIENT
Start: 2018-12-13 | End: 2018-12-16 | Stop reason: HOSPADM

## 2018-12-12 RX ORDER — LORAZEPAM 2 MG/ML
4 INJECTION INTRAMUSCULAR ONCE
Status: DISCONTINUED | OUTPATIENT
Start: 2018-12-12 | End: 2018-12-12

## 2018-12-12 RX ORDER — POTASSIUM CHLORIDE 29.8 MG/ML
40 INJECTION INTRAVENOUS EVERY 4 HOURS
Status: COMPLETED | OUTPATIENT
Start: 2018-12-12 | End: 2018-12-13

## 2018-12-12 RX ORDER — AMITRIPTYLINE HYDROCHLORIDE 100 MG/1
100 TABLET ORAL NIGHTLY PRN
Status: ON HOLD | COMMUNITY
End: 2018-12-16 | Stop reason: HOSPADM

## 2018-12-12 RX ORDER — ESCITALOPRAM OXALATE 10 MG/1
10 TABLET ORAL DAILY
Status: ON HOLD | COMMUNITY
End: 2018-12-16 | Stop reason: HOSPADM

## 2018-12-12 RX ORDER — FINASTERIDE 5 MG/1
5 TABLET, FILM COATED ORAL DAILY
Status: ON HOLD | COMMUNITY
End: 2018-12-16 | Stop reason: SDUPTHER

## 2018-12-12 RX ORDER — HALOPERIDOL 5 MG/ML
2 INJECTION INTRAMUSCULAR ONCE
Status: DISCONTINUED | OUTPATIENT
Start: 2018-12-12 | End: 2018-12-13

## 2018-12-12 RX ORDER — SUMATRIPTAN 50 MG/1
50 TABLET, FILM COATED ORAL
Status: ON HOLD | COMMUNITY
End: 2018-12-16 | Stop reason: SDUPTHER

## 2018-12-12 RX ADMIN — POTASSIUM & SODIUM PHOSPHATES POWDER PACK 280-160-250 MG 2 PACKET: 280-160-250 PACK at 06:12

## 2018-12-12 RX ADMIN — GABAPENTIN 600 MG: 250 SOLUTION ORAL at 06:12

## 2018-12-12 RX ADMIN — FAMOTIDINE 20 MG: 10 INJECTION, SOLUTION INTRAVENOUS at 08:12

## 2018-12-12 RX ADMIN — POTASSIUM & SODIUM PHOSPHATES POWDER PACK 280-160-250 MG 2 PACKET: 280-160-250 PACK at 10:12

## 2018-12-12 RX ADMIN — PIPERACILLIN AND TAZOBACTAM 4.5 G: 4; .5 INJECTION, POWDER, LYOPHILIZED, FOR SOLUTION INTRAVENOUS; PARENTERAL at 03:12

## 2018-12-12 RX ADMIN — CHLORHEXIDINE GLUCONATE 0.12% ORAL RINSE 15 ML: 1.2 LIQUID ORAL at 08:12

## 2018-12-12 RX ADMIN — FUROSEMIDE 40 MG: 10 INJECTION, SOLUTION INTRAMUSCULAR; INTRAVENOUS at 08:12

## 2018-12-12 RX ADMIN — POTASSIUM CHLORIDE 40 MEQ: 400 INJECTION, SOLUTION INTRAVENOUS at 10:12

## 2018-12-12 RX ADMIN — FOLIC ACID 1 MG: 1 TABLET ORAL at 08:12

## 2018-12-12 RX ADMIN — GABAPENTIN 900 MG: 300 CAPSULE ORAL at 08:12

## 2018-12-12 RX ADMIN — HALOPERIDOL LACTATE 2 MG: 5 INJECTION, SOLUTION INTRAMUSCULAR at 04:12

## 2018-12-12 RX ADMIN — LORAZEPAM 4 MG: 2 INJECTION INTRAMUSCULAR at 02:12

## 2018-12-12 RX ADMIN — DEXMEDETOMIDINE HYDROCHLORIDE 1.4 MCG/KG/HR: 100 INJECTION, SOLUTION, CONCENTRATE INTRAVENOUS at 12:12

## 2018-12-12 RX ADMIN — METHYLPREDNISOLONE SODIUM SUCCINATE 80 MG: 125 INJECTION, POWDER, FOR SOLUTION INTRAMUSCULAR; INTRAVENOUS at 06:12

## 2018-12-12 RX ADMIN — BUPROPION HYDROCHLORIDE 150 MG: 75 TABLET, FILM COATED ORAL at 08:12

## 2018-12-12 RX ADMIN — MAGNESIUM SULFATE IN WATER 2 G: 40 INJECTION, SOLUTION INTRAVENOUS at 10:12

## 2018-12-12 RX ADMIN — AZITHROMYCIN MONOHYDRATE 500 MG: 500 INJECTION, POWDER, LYOPHILIZED, FOR SOLUTION INTRAVENOUS at 08:12

## 2018-12-12 RX ADMIN — DEXMEDETOMIDINE HYDROCHLORIDE 1.4 MCG/KG/HR: 100 INJECTION, SOLUTION, CONCENTRATE INTRAVENOUS at 08:12

## 2018-12-12 RX ADMIN — HYDROMORPHONE HYDROCHLORIDE 1 MG: 1 INJECTION, SOLUTION INTRAMUSCULAR; INTRAVENOUS; SUBCUTANEOUS at 12:12

## 2018-12-12 RX ADMIN — ENOXAPARIN SODIUM 40 MG: 100 INJECTION SUBCUTANEOUS at 05:12

## 2018-12-12 RX ADMIN — Medication 312.5 MCG/HR: at 07:12

## 2018-12-12 RX ADMIN — LORAZEPAM 4 MG: 2 INJECTION INTRAMUSCULAR; INTRAVENOUS at 02:12

## 2018-12-12 RX ADMIN — DEXMEDETOMIDINE HYDROCHLORIDE 1.4 MCG/KG/HR: 100 INJECTION, SOLUTION, CONCENTRATE INTRAVENOUS at 04:12

## 2018-12-12 RX ADMIN — ZIPRASIDONE HYDROCHLORIDE 80 MG: 20 CAPSULE ORAL at 08:12

## 2018-12-12 RX ADMIN — PIPERACILLIN AND TAZOBACTAM 4.5 G: 4; .5 INJECTION, POWDER, LYOPHILIZED, FOR SOLUTION INTRAVENOUS; PARENTERAL at 06:12

## 2018-12-12 RX ADMIN — Medication 100 MG: at 08:12

## 2018-12-12 RX ADMIN — HALOPERIDOL LACTATE 2 MG: 5 INJECTION, SOLUTION INTRAMUSCULAR at 12:12

## 2018-12-12 RX ADMIN — POTASSIUM CHLORIDE 40 MEQ: 20 SOLUTION ORAL at 06:12

## 2018-12-12 RX ADMIN — DEXMEDETOMIDINE HYDROCHLORIDE 1.4 MCG/KG/HR: 100 INJECTION, SOLUTION, CONCENTRATE INTRAVENOUS at 03:12

## 2018-12-12 RX ADMIN — DEXMEDETOMIDINE HYDROCHLORIDE 1.4 MCG/KG/HR: 100 INJECTION, SOLUTION, CONCENTRATE INTRAVENOUS at 06:12

## 2018-12-12 RX ADMIN — LORAZEPAM 2 MG: 2 INJECTION INTRAMUSCULAR; INTRAVENOUS at 09:12

## 2018-12-12 RX ADMIN — LORAZEPAM 2 MG: 2 INJECTION INTRAMUSCULAR; INTRAVENOUS at 11:12

## 2018-12-12 RX ADMIN — PIPERACILLIN AND TAZOBACTAM 4.5 G: 4; .5 INJECTION, POWDER, LYOPHILIZED, FOR SOLUTION INTRAVENOUS; PARENTERAL at 10:12

## 2018-12-12 NOTE — PLAN OF CARE
Problem: Adult Inpatient Plan of Care  Goal: Plan of Care Review  Outcome: Ongoing (interventions implemented as appropriate)  No acute events throughout shift. VSS. Precedex gtt infusing per MD order. Pt able to follow simple commands but gets agitated and anxious easily. Managing agitation with haldol q4h PRN. Bed alarm on, restraints d/c this AM. Pt extubated to comfort flow, currently on 25L 50%. Maintaining sats >92% and will wean comfort flow as tolerated. UO good. Remains NPO until pt can pass bedside swallow study. Accuchecks q6h. Plan to wean comfort flow and monitor resp status. Plan of care reviewed with Ashwin Madrid at the bedside. All questions and concerns addressed. Will continue to monitor.

## 2018-12-12 NOTE — SUBJECTIVE & OBJECTIVE
Interval History/Significant Events: No acute events overnight.  On precedex, ketamine, and fentanyl infusion.  Intermittently requiring lorazepam IVP for agitation.     Review of Systems   Unable to perform ROS: Intubated     Objective:     Vital Signs (Most Recent):  Temp: 98.1 °F (36.7 °C) (12/12/18 0700)  Pulse: 96 (12/12/18 0800)  Resp: (!) 22 (12/12/18 0800)  BP: 114/68 (12/12/18 0400)  SpO2: (!) 90 % (12/12/18 0800) Vital Signs (24h Range):  Temp:  [98.1 °F (36.7 °C)-99.3 °F (37.4 °C)] 98.1 °F (36.7 °C)  Pulse:  [] 96  Resp:  [19-50] 22  SpO2:  [83 %-100 %] 90 %  BP: (112-114)/(68-71) 114/68  Arterial Line BP: ()/(49-82) 120/64   Weight: 74.4 kg (164 lb)  Body mass index is 22.87 kg/m².      Intake/Output Summary (Last 24 hours) at 12/12/2018 0829  Last data filed at 12/12/2018 0800  Gross per 24 hour   Intake 2709.27 ml   Output 5535 ml   Net -2825.73 ml       Physical Exam   Constitutional: He appears well-developed and well-nourished. No distress. He is sedated, intubated and restrained.   HENT:   Head: Normocephalic and atraumatic.   Eyes: Pupils are equal, round, and reactive to light. No scleral icterus.   Periorbital edema   Neck: No JVD present. No tracheal deviation present.   Cardiovascular: Normal rate, regular rhythm and normal heart sounds.   Pulses:       Radial pulses are 2+ on the right side, and 2+ on the left side.        Dorsalis pedis pulses are 2+ on the right side, and 2+ on the left side.   Pulmonary/Chest: Effort normal and breath sounds normal. He is intubated. He has no wheezes. He has no rhonchi. He has no rales.   Abdominal: Soft. Bowel sounds are normal. He exhibits no distension.   Genitourinary:   Genitourinary Comments: Neri with clear yellow output    Neurological:   Lethargic on sedation however following request, thumbs up, wiggling toes, attempting eye opening.  PERRL, midline, no nystagmus.  Moving all extremities equally and symmetrically.    Skin: Skin is  warm and dry. No rash noted. He is not diaphoretic. No erythema.   Nursing note and vitals reviewed.      Vents:  Vent Mode: A/C (12/12/18 0716)  Ventilator Initiated: Yes (12/09/18 0200)  Set Rate: 22 bmp (12/12/18 0716)  Vt Set: 380 mL (12/12/18 0716)  Pressure Support: 0 cmH20 (12/12/18 0716)  PEEP/CPAP: 14 cmH20 (12/12/18 0716)  Oxygen Concentration (%): 40 (12/12/18 0800)  Peak Airway Pressure: 22 cmH2O (12/12/18 0716)  Plateau Pressure: 21 cmH20 (12/12/18 0716)  Total Ve: 7.46 mL (12/12/18 0716)  F/VT Ratio<105 (RSBI): (!) 70.22 (12/12/18 0504)  Lines/Drains/Airways     Central Venous Catheter Line                 Percutaneous Central Line Insertion/Assessment - triple lumen  12/08/18 0525 right internal jugular 4 days          Drain                 NG/OG Tube 12/09/18 0528 [place] sump 14 Fr. Right mouth 3 days         Urethral Catheter 12/09/18 0523 Latex 18 Fr. 3 days          Airway                 Airway - Non-Surgical 12/09/18 0530 Endotracheal Tube 3 days          Arterial Line                 Arterial Line 12/08/18 0521 Left Radial 4 days          Peripheral Intravenous Line                 Peripheral IV - Single Lumen 12/08/18 0523 Left Antecubital 4 days         Peripheral IV - Single Lumen 12/08/18 0532 Right Hand 4 days              Significant Labs:    CBC/Anemia Profile:  Recent Labs   Lab 12/11/18  0320 12/12/18  0352   WBC 7.11 10.39   HGB 9.4* 9.5*   HCT 28.9* 29.9*    251   MCV 94 96   RDW 12.8 13.2        Chemistries:  Recent Labs   Lab 12/11/18  0320 12/11/18  1111 12/11/18  2019 12/12/18  0352    139 140 140   K 3.9 3.7 4.0 3.6    99 97 100   CO2 31* 31* 34* 34*   BUN 18 19 22* 23*   CREATININE 0.6 0.6 0.8 0.7   CALCIUM 8.1* 8.3* 8.8 8.6*   ALBUMIN 2.0*  --   --  2.5*   PROT 5.5*  --   --  5.9*   BILITOT 0.4  --   --  0.6   ALKPHOS 55  --   --  45*   ALT 48*  --   --  70*   AST 41*  --   --  42*   MG 2.4 2.1 2.2 2.1   PHOS 2.4* 2.1* 3.4 2.7       All pertinent labs within  the past 24 hours have been reviewed.    Significant Imaging:  I have reviewed and interpreted all pertinent imaging results/findings within the past 24 hours.

## 2018-12-12 NOTE — PLAN OF CARE
Problem: Adult Inpatient Plan of Care  Goal: Plan of Care Review  No acute events throughout night, remains on Ketamine, Precedex and Fentanyl for sedation - pt intermittently extremely agitated/combative - Ativan PRN. Levophed off. VS and assessment per flow sheet, patient progressing towards goals as tolerated, plan of care reviewed with Ashwin Madrid and family, all concerns addressed, will continue to monitor.

## 2018-12-12 NOTE — PLAN OF CARE
Problem: Adult Inpatient Plan of Care  Goal: Plan of Care Review  Outcome: Ongoing (interventions implemented as appropriate)  No acute events throughout day. See vital signs and assessments in flowsheets. See below for updates on today's progress.     Pulmonary: Vent settings inc to 40% 14 PEEP RR 22 according to ARDSnet protocol d/t sats maintaining mid-high 80s. Currently sating 95%. Minimal secretions, CXR x2 done    Cardiovascular: NSR 60-70s while calm, ST 120s when agitated. MAPs maintained >65, levo restarted around 1700 @ 0.01mcg/kg/min. CVP 17 --> 8    Neurological: Pt intermittently agitated, ketamine, precedex, fentanyl needed to maintain adequate sedation. Ketamine inc to 15mcg/kg/min per Amanda Piper, NP, fentanyl @ 175mcg/hr, versed weaned off, precedex remains @ 1.4mcg/kg/hr. Able to follow commands w/ all extremities while awake.    Gastrointestinal: No BM today, OGT in place and clamped.    Genitourinary: Neri output 4.8L for shift. 3x doses 40mg lasix given.    Endocrine: BG monitored, no coverage needed    Infusions: Precedex, fentanyl, ketamine, norepinephrine, abx    Patient progressing towards goals as tolerated, plan of care communicated and reviewed with Aswhin Madrid and family. All concerns addressed. Will continue to monitor.

## 2018-12-12 NOTE — CARE UPDATE
Extubated to comfort flow.  Precedex continued.  All other sedating medications discontinued.      Updated sister over phone regarding extubation.     PRAKASH MATSON, ACNP-BC  Critical Care Medicine  [phone]

## 2018-12-12 NOTE — ASSESSMENT & PLAN NOTE
- QTc 457  - restarted on home medications per new list provided by family  - haldol Q 2 hours as needed for agitation  - daily EKGs to evaluate QTc

## 2018-12-13 LAB
ALBUMIN SERPL BCP-MCNC: 2.6 G/DL
ALP SERPL-CCNC: 51 U/L
ALT SERPL W/O P-5'-P-CCNC: 80 U/L
ANION GAP SERPL CALC-SCNC: 10 MMOL/L
AST SERPL-CCNC: 50 U/L
BASOPHILS # BLD AUTO: 0.01 K/UL
BASOPHILS NFR BLD: 0.1 %
BILIRUB SERPL-MCNC: 0.8 MG/DL
BUN SERPL-MCNC: 16 MG/DL
CALCIUM SERPL-MCNC: 8.3 MG/DL
CHLORIDE SERPL-SCNC: 106 MMOL/L
CO2 SERPL-SCNC: 23 MMOL/L
CREAT SERPL-MCNC: 0.6 MG/DL
DIFFERENTIAL METHOD: ABNORMAL
EOSINOPHIL # BLD AUTO: 0.1 K/UL
EOSINOPHIL NFR BLD: 1.6 %
ERYTHROCYTE [DISTWIDTH] IN BLOOD BY AUTOMATED COUNT: 12.7 %
EST. GFR  (AFRICAN AMERICAN): >60 ML/MIN/1.73 M^2
EST. GFR  (NON AFRICAN AMERICAN): >60 ML/MIN/1.73 M^2
GLUCOSE SERPL-MCNC: 77 MG/DL
HCT VFR BLD AUTO: 30.1 %
HCV RNA SERPL NAA+PROBE-LOG IU: <1.08 LOG (10) IU/ML
HCV RNA SERPL QL NAA+PROBE: NOT DETECTED IU/ML
HCV RNA SPEC NAA+PROBE-ACNC: <12 IU/ML
HGB BLD-MCNC: 10 G/DL
IMM GRANULOCYTES # BLD AUTO: 0.24 K/UL
IMM GRANULOCYTES NFR BLD AUTO: 2.9 %
LYMPHOCYTES # BLD AUTO: 1.6 K/UL
LYMPHOCYTES NFR BLD: 19.5 %
MAGNESIUM SERPL-MCNC: 1.9 MG/DL
MCH RBC QN AUTO: 31.3 PG
MCHC RBC AUTO-ENTMCNC: 33.2 G/DL
MCV RBC AUTO: 94 FL
MONOCYTES # BLD AUTO: 0.8 K/UL
MONOCYTES NFR BLD: 9 %
NEUTROPHILS # BLD AUTO: 5.6 K/UL
NEUTROPHILS NFR BLD: 66.9 %
NRBC BLD-RTO: 0 /100 WBC
PHOSPHATE SERPL-MCNC: 1.9 MG/DL
PLATELET # BLD AUTO: 293 K/UL
PMV BLD AUTO: 9.2 FL
POCT GLUCOSE: 84 MG/DL (ref 70–110)
POCT GLUCOSE: 89 MG/DL (ref 70–110)
POTASSIUM SERPL-SCNC: 4.1 MMOL/L
PROT SERPL-MCNC: 5.7 G/DL
RBC # BLD AUTO: 3.2 M/UL
SODIUM SERPL-SCNC: 139 MMOL/L
WBC # BLD AUTO: 8.35 K/UL

## 2018-12-13 PROCEDURE — 63600175 PHARM REV CODE 636 W HCPCS: Performed by: NURSE PRACTITIONER

## 2018-12-13 PROCEDURE — 27000221 HC OXYGEN, UP TO 24 HOURS

## 2018-12-13 PROCEDURE — 94761 N-INVAS EAR/PLS OXIMETRY MLT: CPT

## 2018-12-13 PROCEDURE — 27100171 HC OXYGEN HIGH FLOW UP TO 24 HOURS

## 2018-12-13 PROCEDURE — 63600175 PHARM REV CODE 636 W HCPCS: Performed by: STUDENT IN AN ORGANIZED HEALTH CARE EDUCATION/TRAINING PROGRAM

## 2018-12-13 PROCEDURE — 84100 ASSAY OF PHOSPHORUS: CPT

## 2018-12-13 PROCEDURE — 93010 ELECTROCARDIOGRAM REPORT: CPT | Mod: ,,, | Performed by: INTERNAL MEDICINE

## 2018-12-13 PROCEDURE — 85025 COMPLETE CBC W/AUTO DIFF WBC: CPT

## 2018-12-13 PROCEDURE — 99291 CRITICAL CARE FIRST HOUR: CPT | Mod: ,,, | Performed by: NURSE PRACTITIONER

## 2018-12-13 PROCEDURE — 27100092 HC HIGH FLOW DELIVERY CANNULA

## 2018-12-13 PROCEDURE — 25000003 PHARM REV CODE 250: Performed by: NURSE PRACTITIONER

## 2018-12-13 PROCEDURE — 83735 ASSAY OF MAGNESIUM: CPT

## 2018-12-13 PROCEDURE — 93005 ELECTROCARDIOGRAM TRACING: CPT

## 2018-12-13 PROCEDURE — 25000003 PHARM REV CODE 250: Performed by: STUDENT IN AN ORGANIZED HEALTH CARE EDUCATION/TRAINING PROGRAM

## 2018-12-13 PROCEDURE — 20000000 HC ICU ROOM

## 2018-12-13 PROCEDURE — 80053 COMPREHEN METABOLIC PANEL: CPT

## 2018-12-13 RX ORDER — HALOPERIDOL 5 MG/ML
2 INJECTION INTRAMUSCULAR EVERY 8 HOURS PRN
Status: DISCONTINUED | OUTPATIENT
Start: 2018-12-13 | End: 2018-12-14

## 2018-12-13 RX ORDER — ACETAMINOPHEN 325 MG/1
650 TABLET ORAL EVERY 6 HOURS PRN
Status: DISCONTINUED | OUTPATIENT
Start: 2018-12-13 | End: 2018-12-16 | Stop reason: HOSPADM

## 2018-12-13 RX ADMIN — POTASSIUM CHLORIDE 40 MEQ: 400 INJECTION, SOLUTION INTRAVENOUS at 02:12

## 2018-12-13 RX ADMIN — GABAPENTIN 900 MG: 300 CAPSULE ORAL at 03:12

## 2018-12-13 RX ADMIN — GABAPENTIN 900 MG: 300 CAPSULE ORAL at 08:12

## 2018-12-13 RX ADMIN — BUPROPION HYDROCHLORIDE 450 MG: 150 TABLET, EXTENDED RELEASE ORAL at 08:12

## 2018-12-13 RX ADMIN — DEXMEDETOMIDINE HYDROCHLORIDE 1.4 MCG/KG/HR: 100 INJECTION, SOLUTION, CONCENTRATE INTRAVENOUS at 06:12

## 2018-12-13 RX ADMIN — ACETAMINOPHEN 650 MG: 325 TABLET, FILM COATED ORAL at 03:12

## 2018-12-13 RX ADMIN — HALOPERIDOL LACTATE 2 MG: 5 INJECTION, SOLUTION INTRAMUSCULAR at 04:12

## 2018-12-13 RX ADMIN — ACETAMINOPHEN 650 MG: 325 TABLET, FILM COATED ORAL at 08:12

## 2018-12-13 RX ADMIN — CEFTRIAXONE 2 G: 2 INJECTION, SOLUTION INTRAVENOUS at 02:12

## 2018-12-13 RX ADMIN — DEXMEDETOMIDINE HYDROCHLORIDE 1.3 MCG/KG/HR: 100 INJECTION, SOLUTION, CONCENTRATE INTRAVENOUS at 09:12

## 2018-12-13 RX ADMIN — AMITRIPTYLINE HYDROCHLORIDE 100 MG: 25 TABLET, FILM COATED ORAL at 08:12

## 2018-12-13 RX ADMIN — SODIUM CHLORIDE 1000 ML: 0.9 INJECTION, SOLUTION INTRAVENOUS at 02:12

## 2018-12-13 RX ADMIN — DEXMEDETOMIDINE HYDROCHLORIDE 0.6 MCG/KG/HR: 100 INJECTION, SOLUTION, CONCENTRATE INTRAVENOUS at 01:12

## 2018-12-13 RX ADMIN — PIPERACILLIN AND TAZOBACTAM 4.5 G: 4; .5 INJECTION, POWDER, LYOPHILIZED, FOR SOLUTION INTRAVENOUS; PARENTERAL at 07:12

## 2018-12-13 RX ADMIN — ESCITALOPRAM OXALATE 10 MG: 10 TABLET ORAL at 08:12

## 2018-12-13 RX ADMIN — SODIUM CHLORIDE 500 ML: 0.9 INJECTION, SOLUTION INTRAVENOUS at 11:12

## 2018-12-13 RX ADMIN — FOLIC ACID 1 MG: 1 TABLET ORAL at 08:12

## 2018-12-13 RX ADMIN — ZIPRASIDONE HYDROCHLORIDE 80 MG: 20 CAPSULE ORAL at 08:12

## 2018-12-13 RX ADMIN — ZIPRASIDONE HYDROCHLORIDE 80 MG: 20 CAPSULE ORAL at 09:12

## 2018-12-13 RX ADMIN — Medication 100 MG: at 08:12

## 2018-12-13 RX ADMIN — ENOXAPARIN SODIUM 40 MG: 100 INJECTION SUBCUTANEOUS at 05:12

## 2018-12-13 NOTE — ASSESSMENT & PLAN NOTE
- Likely 2/2 pneumonia; initial P:F 54  - abx, antiviral, and steroids  - ARDSnet Protocol  - sedated, paralyzed, and proned on 12/9; supinated and paralytic d/c on 12/10  - aggressive diuresis continued.  - respiratory viral panel negative; Oseltamivir discontinued on 12/12.   - extubated to comfort flow on 12/12.  High dose steroids discontinued.   - blood and respiratory cultures no growth to date  - urine legionella pending  - vancomycin discontinued, pip/tazo continued.

## 2018-12-13 NOTE — ASSESSMENT & PLAN NOTE
--multifactorial from sedation, infection, steroids  --continues to be agitated following extubation requiring precedex infusion and prn haldol.   --steroids discontinued   --restarted on home antipsychotic regimen.

## 2018-12-13 NOTE — PROGRESS NOTES
[facility]  Critical Care Medicine  Progress Note    Patient Name: Ashwin Madrid  MRN: [id number]  Admission Date: 12/9/2018  Hospital Length of Stay: 3 days  Code Status: Full Code  Attending Provider: Jv Nicole*  Primary Care Provider: Primary Doctor No   Principal Problem: ARDS (adult respiratory distress syndrome)    Subjective:     HPI:  Mr Madrid is a 39 yo M w PMHx significant for COPD (nil PFT), bipolar disorder, ADHD, polysubstance abuse and GERD who was transferred to [facility] from [facility] d/t concern for ARDS requiring proning.     As patient was intubated on arrival, the majority of the history was collected from transfer paperwork and chart review. Pt presented to OSH with 3-day history of progressively worsening cough, fever and shortness of breath with associated decrease in PO intake and urine output. On admission, he denied IVDU and stated that he had abstained from his drug of choice (methamphetamine). On admission to OSH, he was noted to have leukocytosis with WBC of 21.4, fever of 102.7F, tachycardia of 127, tachypnea of 41 and hypoxemia with SpO2 of 84%. Imaging studies revealed RLL infiltrate concerning for community-acquired pneumonia.     Over the course of his 24 hours at OSH, his supplementary oxygen requirements steadily increased from nasal cannula to nonrebreather to BiPAP to ultimately intubation. Repeat CXR revealed development of bilateral infiltrates concerning for ARDS; GzM3-ln-VhN7 ratio was 54 and he was paralyzed with intermittent rocuronium.     Labs at OSH also revealed a transaminitis with AST of 186 and ALT of 48.     Hospital/ICU Course:  Transferred to MICU on 12/9 intubated, paralyzed, and sedated. Prone positioning for P:F 54. Supinated on 12/10 at 0930, P:F 200. Paralytic discontinued with severe agitation on fentanyl, versed, precedex, and dilaudid pushes. Low dose propofol used for a short time, then changed to ketamine.  Weaning vent according to ARDSnet protocol. Bipolar meds restarted on 12/11. Diuresing aggressively and replacing lytes accordingly.  Versed discontinued on 12/11. Extubated to comfort flow on 12/12.  Ketamine and Fentanyl infusions discontinued. Haldol ordered prn.  Updated home medication list with most recent medication regimen provided by patient's family.     Interval History/Significant Events: No acute events overnight.  On precedex, ketamine, and fentanyl infusion.  Intermittently requiring lorazepam IVP for agitation.     Review of Systems   Unable to perform ROS: Intubated     Objective:     Vital Signs (Most Recent):  Temp: 98.1 °F (36.7 °C) (12/12/18 0700)  Pulse: 96 (12/12/18 0800)  Resp: (!) 22 (12/12/18 0800)  BP: 114/68 (12/12/18 0400)  SpO2: (!) 90 % (12/12/18 0800) Vital Signs (24h Range):  Temp:  [98.1 °F (36.7 °C)-99.3 °F (37.4 °C)] 98.1 °F (36.7 °C)  Pulse:  [] 96  Resp:  [19-50] 22  SpO2:  [83 %-100 %] 90 %  BP: (112-114)/(68-71) 114/68  Arterial Line BP: ()/(49-82) 120/64   Weight: 74.4 kg (164 lb)  Body mass index is 22.87 kg/m².      Intake/Output Summary (Last 24 hours) at 12/12/2018 0829  Last data filed at 12/12/2018 0800  Gross per 24 hour   Intake 2709.27 ml   Output 5535 ml   Net -2825.73 ml       Physical Exam   Constitutional: He appears well-developed and well-nourished. No distress. He is sedated, intubated and restrained.   HENT:   Head: Normocephalic and atraumatic.   Eyes: Pupils are equal, round, and reactive to light. No scleral icterus.   Periorbital edema   Neck: No JVD present. No tracheal deviation present.   Cardiovascular: Normal rate, regular rhythm and normal heart sounds.   Pulses:       Radial pulses are 2+ on the right side, and 2+ on the left side.        Dorsalis pedis pulses are 2+ on the right side, and 2+ on the left side.   Pulmonary/Chest: Effort normal and breath sounds normal. He is intubated. He has no wheezes. He has no rhonchi. He has no  rales.   Abdominal: Soft. Bowel sounds are normal. He exhibits no distension.   Genitourinary:   Genitourinary Comments: Neri with clear yellow output    Neurological:   Lethargic on sedation however following request, thumbs up, wiggling toes, attempting eye opening.  PERRL, midline, no nystagmus.  Moving all extremities equally and symmetrically.    Skin: Skin is warm and dry. No rash noted. He is not diaphoretic. No erythema.   Nursing note and vitals reviewed.      Vents:  Vent Mode: A/C (12/12/18 0716)  Ventilator Initiated: Yes (12/09/18 0200)  Set Rate: 22 bmp (12/12/18 0716)  Vt Set: 380 mL (12/12/18 0716)  Pressure Support: 0 cmH20 (12/12/18 0716)  PEEP/CPAP: 14 cmH20 (12/12/18 0716)  Oxygen Concentration (%): 40 (12/12/18 0800)  Peak Airway Pressure: 22 cmH2O (12/12/18 0716)  Plateau Pressure: 21 cmH20 (12/12/18 0716)  Total Ve: 7.46 mL (12/12/18 0716)  F/VT Ratio<105 (RSBI): (!) 70.22 (12/12/18 0504)  Lines/Drains/Airways     Central Venous Catheter Line                 Percutaneous Central Line Insertion/Assessment - triple lumen  12/08/18 0525 right internal jugular 4 days          Drain                 NG/OG Tube 12/09/18 0528 Wilkin sump 14 Fr. Right mouth 3 days         Urethral Catheter 12/09/18 0523 Latex 18 Fr. 3 days          Airway                 Airway - Non-Surgical 12/09/18 0530 Endotracheal Tube 3 days          Arterial Line                 Arterial Line 12/08/18 0521 Left Radial 4 days          Peripheral Intravenous Line                 Peripheral IV - Single Lumen 12/08/18 0523 Left Antecubital 4 days         Peripheral IV - Single Lumen 12/08/18 0532 Right Hand 4 days              Significant Labs:    CBC/Anemia Profile:  Recent Labs   Lab 12/11/18  0320 12/12/18  0352   WBC 7.11 10.39   HGB 9.4* 9.5*   HCT 28.9* 29.9*    251   MCV 94 96   RDW 12.8 13.2        Chemistries:  Recent Labs   Lab 12/11/18  0320 12/11/18  1111 12/11/18  2019 12/12/18  0352    139 140 140   K 3.9  3.7 4.0 3.6    99 97 100   CO2 31* 31* 34* 34*   BUN 18 19 22* 23*   CREATININE 0.6 0.6 0.8 0.7   CALCIUM 8.1* 8.3* 8.8 8.6*   ALBUMIN 2.0*  --   --  2.5*   PROT 5.5*  --   --  5.9*   BILITOT 0.4  --   --  0.6   ALKPHOS 55  --   --  45*   ALT 48*  --   --  70*   AST 41*  --   --  42*   MG 2.4 2.1 2.2 2.1   PHOS 2.4* 2.1* 3.4 2.7       All pertinent labs within the past 24 hours have been reviewed.    Significant Imaging:  I have reviewed and interpreted all pertinent imaging results/findings within the past 24 hours.    Assessment/Plan:     Neuro   Acute metabolic encephalopathy    --multifactorial from sedation, infection, steroids  --continues to be agitated following extubation requiring precedex infusion and prn haldol.   --steroids discontinued   --restarted on home antipsychotic regimen.      Psychiatric          Bipolar 1 disorder    - QTc 457  - restarted on home medications per new list provided by family  - haldol Q 2 hours as needed for agitation  - daily EKGs to evaluate QTc     Pulmonary   * ARDS (adult respiratory distress syndrome)/Acute Hypoxemic Respiratory Failure    - Likely 2/2 pneumonia; initial P:F 54  - started empirically on broad spectrum antibiotics, antiviral, and high dose steroids  - ARDSnet Protocol  - sedated, paralyzed, and proned on 12/9; supinated and paralytic d/c on 12/10  - continue aggressive diuresis   - respiratory viral panel negative; Oseltamivir discontinued on 12/12.   - extubated to comfort flow on 12/12.  High dose steroids discontinued 12/12.   - blood and respiratory cultures no growth to date  - urine legionella pending  - vancomycin discontinued, pip/tazo continued.        Pneumonia    -see ARDS     Cardiac/Vascular   Hypertriglyceridemia    - Lipase WNL  - downtrending with discontinuation of propofol.   - continue to trend.                Immunology/Multi System   Hepatitis C antibody positive in blood    - RNA pending.      GI   Transaminitis    - AST of 159  and ALT of 59  - EtOH <10  - Thiamine and folate replacement  - liver enzymes improving.                    DVT ppx:  Enoxaparin     Patient's sister updated over phone and patient's aunt updated at bedside.      Discussed plan with Dr. Nicole    Critical Care Time: 70 minutes  Critical secondary to Patient has a condition that poses threat to life and bodily function: Acute hypoxemic respiratory failure, extubated to comfort flow.  Close monitoring for respiratory deterioration. Acute encephalopathy requiring precedex infusion.      Critical care was time spent personally by me on the following activities: development of treatment plan with patient or surrogate and bedside caregivers, discussions with consultants, evaluation of patient's response to treatment, examination of patient, ordering and performing treatments and interventions, ordering and review of laboratory studies, ordering and review of radiographic studies, pulse oximetry, re-evaluation of patient's condition. This critical care time did not overlap with that of any other provider or involve time for any procedures.     Janessa Mccauley NP  Critical Care Medicine  [facility]

## 2018-12-13 NOTE — HPI
"Per Primary MD:  "Mr Madrid is a 37 yo M w PMHx significant for COPD (nil PFT), bipolar disorder, ADHD, polysubstance abuse and GERD who was transferred to [facility] from [facility] d/t concern for ARDS requiring proning.      As pt was intubated on arrival, the majority of the history was collected from transfer paperwork and chart review. Pt presented to OSH with 3-day history of progressively worsening cough, fever and shortness of breath with associated decrease in PO intake and urine output. On admission, he denied IVDU and stated that he had abstained from his drug of choice (methamphetamine). On admission to OSH, he was noted to have leukocytosis with WBC of 21.4, fever of 102.7F, tachycardia of 127, tachypnea of 41 and hypoxemia with SpO2 of 84%. Imaging studies revealed RLL infiltrate concerning for community-acquired pneumonia.      Over the course of his 24 hours at OSH, his supplementary oxygen requirements steadily increased from nasal cannula to nonrebreather to BiPAP to ultimately intubation. Repeat CXR revealed development of bilateral infiltrates concerning for ARDS; AvG6-pm-BpF6 ratio was 54 and he was paralyzed with intermittent rocuronium.      Labs at OSH also revealed a transaminitis with AST of 186 and ALT of 48."     Per Psychiatry MD:   Ashwin Madrid is a 38 y.o. male with a past psychiatric history of bipolar disorder (unspecified), ADHD, polysubstance abuse (prescription opioids, heroin), currently presenting as a transfer from [facility] on 12/9 for management of ARDS.  Psychiatry was consulted to address the patient's symptoms of management of bipolar medications.     Since arrival, pt was extubated on 12/12/18.  RN reports pressured speech at that time, intermittent agitation.  On initial resident interview, pt answered questions for only a few minutes before declining any further questions.  I spoke with pt's sister, Amanda ([phone]) for most of the history.  " She reports that pt was hospitalized at [facility] from 4/20 - 11/2018 for opioid/heroin abuse.  He was discharged in 11/2018 with plans to f/u locally at [place].  Sister states he was seen there at least once since discharge, but no medication changes, and she does not recall the name of a psychiatrist/psychologist there.       Collateral:   Amanda (sister) [phone]    SUBJECTIVE   Currently, the patient is endorsing the following:    Medical Review Of Systems:  Limited 2/2 patient participation  Constitutional: negative for fevers  Respiratory: positive for increased work of breathing  Behavioral/Psych: positive for agitation, confusion    Psychiatric Review Of Systems - Is patient experiencing or having changes in:  Limited 2/2 pt participation  sleep: yes  Pressured speech:  yes    Past Medical/Surgical History:  Past Medical History:   Diagnosis Date    ADHD (attention deficit hyperactivity disorder) 2/7/2012    Anxiety     Bipolar 1 disorder 2/7/2012    Depression     GERD (gastroesophageal reflux disease)       has a past medical history of ADHD (attention deficit hyperactivity disorder) (2/7/2012), Anxiety, Bipolar 1 disorder (2/7/2012), Depression, and GERD (gastroesophageal reflux disease).  Past Surgical History:   Procedure Laterality Date    EGD (ESOPHAGOGASTRODUODENOSCOPY) N/A 9/14/2012    Performed by Juancarlos So Jr., [facility] at [facility]    ESOPHAGOGASTRODUODENOSCOPY         Past Psychiatric History:  Previous Medication Trials: Yes - uncertain which medications  Previous Psychiatric Hospitalizations: Yes - [facility] April - November 2018  Previous Suicide Attempts: No  History of Violence: No  Outpatient Psychiatrist: Yes - In [place], uncertain name  Outpatient Psychotherapist: Yes - In [place], uncertain name    Social History:  Marital Status: not   Children: 0   Employment Status/Info: unemployed  Education: 10th grade  Special Ed: no  Housing/Lives with:  mom, sister (Amanda)  History of phys/sexual abuse: No  Access to gun: No    Substance Abuse History:  Recreational Drugs: History of heroin, prescription opioids  Use of Alcohol: denied  Tobacco Use:yes 2 ppd    Functioning Relationships: good support system    Psychosocial Factors:  Peer group, social, ethic, cultural, emotional, and health factors: lives with family  Living situation, family constellation, family circumstances/home: lives with family, sister provides significant support

## 2018-12-13 NOTE — ASSESSMENT & PLAN NOTE
- Likely 2/2 pneumonia; initial P:F 54  - abx, antiviral, and steroids  - ARDSnet Protocol  - sedated, paralyzed, and proned on 12/9; supinated and paralytic d/c on 12/10  - aggressively diuresised   - respiratory viral panel negative; Oseltamivir discontinued on 12/12.   - extubated to comfort flow on 12/12.  High dose steroids discontinued.   - blood and respiratory cultures no growth to date  - urine legionella negative   - vancomycin discontinued, pip/tazo narrowed to rocephin

## 2018-12-13 NOTE — PLAN OF CARE
Problem: Adult Inpatient Plan of Care  Goal: Plan of Care Review  Outcome: Ongoing (interventions implemented as appropriate)  No acute events throughout shift. Pt awake and able to follow simple commands, is sometimes disoriented to situation. Sitter at bedside throughout shift. Precedex gtt titrated off, will restart if needed for agitation. Daily EKG obtained. VSS WNL. CVP 1,2,2. 500 cc NS bolus given. Weaned from comfort flow, to high flow, currently on 4 L nc w/ humidification. Sats maintainted >92. No c/o SOB. Arterial line removed per order. Cardiac diet ordered but pt states he doesn't have an appetite. UO good. Plan to continue to wean o2 as tolerated. Plan of care reviewed with Ashwin Madrid, all questions and concerns addressed. Will continue to monitor.

## 2018-12-13 NOTE — PROGRESS NOTES
Pt transitioned from 15 L 40% comfort flow nasal cannula to 8 L bubble high flow nasal cannula. SpO2 95%. Will continue to monitor closely.

## 2018-12-13 NOTE — PROGRESS NOTES
Pt BP ~80's/40's (MAP ~60). Notified [facility] TAQUERIA Patel. Ordered to give 500 cc NS bolus. Will continue to monitor.

## 2018-12-13 NOTE — CONSULTS
"[facility]  Psychiatry  Consult Note    Patient Name: Ashwin Madrid  MRN: [id number]   Code Status: Full Code  Admission Date: 12/9/2018  Hospital Length of Stay: 4 days  Attending Physician: Jv Nicole*  Primary Care Provider: Primary Doctor No    Current Legal Status: N/A    Patient information was obtained from patient, relative(s) and past medical records.   Inpatient consult to Psychiatry  Consult performed by: David Purcell MD  Consult ordered by: Amanda Piper NP        Subjective:     Principal Problem:ARDS (adult respiratory distress syndrome)    Chief Complaint:  Management of bipolar medications     HPI:   Per Primary MD:  "Mr Madrid is a 39 yo M w PMHx significant for COPD (nil PFT), bipolar disorder, ADHD, polysubstance abuse and GERD who was transferred to [facility] from [facility] d/t concern for ARDS requiring proning.      As pt was intubated on arrival, the majority of the history was collected from transfer paperwork and chart review. Pt presented to OSH with 3-day history of progressively worsening cough, fever and shortness of breath with associated decrease in PO intake and urine output. On admission, he denied IVDU and stated that he had abstained from his drug of choice (methamphetamine). On admission to OSH, he was noted to have leukocytosis with WBC of 21.4, fever of 102.7F, tachycardia of 127, tachypnea of 41 and hypoxemia with SpO2 of 84%. Imaging studies revealed RLL infiltrate concerning for community-acquired pneumonia.      Over the course of his 24 hours at OSH, his supplementary oxygen requirements steadily increased from nasal cannula to nonrebreather to BiPAP to ultimately intubation. Repeat CXR revealed development of bilateral infiltrates concerning for ARDS; VeR2-lf-YeP4 ratio was 54 and he was paralyzed with intermittent rocuronium.      Labs at OSH also revealed a transaminitis with AST of 186 and ALT of 48."     Per Psychiatry " MD:   Ashwin Madrid is a 38 y.o. male with a past psychiatric history of bipolar disorder (unspecified), ADHD, polysubstance abuse (prescription opioids, heroin), currently presenting as a transfer from [facility] on 12/9 for management of ARDS.  Psychiatry was consulted to address the patient's symptoms of management of bipolar medications.     Since arrival, pt was extubated on 12/12/18.  RN reports pressured speech at that time, intermittent agitation.  On initial resident interview, pt answered questions for only a few minutes before declining any further questions.  I spoke with pt's sister, Amanda ([phone]) for most of the history.  She reports that pt was hospitalized at [facility] from 4/20 - 11/2018 for opioid/heroin abuse.  He was discharged in 11/2018 with plans to f/u locally at [facility].  Sister states he was seen there at least once since discharge, but no medication changes, and she does not recall the name of a psychiatrist/psychologist there.       Collateral:   Amanda (sister) [phone]    SUBJECTIVE   Currently, the patient is endorsing the following:    Medical Review Of Systems:  Limited 2/2 patient participation  Constitutional: negative for fevers  Respiratory: positive for increased work of breathing  Behavioral/Psych: positive for agitation, confusion    Psychiatric Review Of Systems - Is patient experiencing or having changes in:  Limited 2/2 pt participation  sleep: yes  Pressured speech:  yes    Past Medical/Surgical History:  Past Medical History:   Diagnosis Date    ADHD (attention deficit hyperactivity disorder) 2/7/2012    Anxiety     Bipolar 1 disorder 2/7/2012    Depression     GERD (gastroesophageal reflux disease)       has a past medical history of ADHD (attention deficit hyperactivity disorder) (2/7/2012), Anxiety, Bipolar 1 disorder (2/7/2012), Depression, and GERD (gastroesophageal reflux disease).  Past Surgical History:   Procedure Laterality  Date    EGD (ESOPHAGOGASTRODUODENOSCOPY) N/A 9/14/2012    Performed by Juancarlos So Jr., MD at [facility] ENDO    ESOPHAGOGASTRODUODENOSCOPY         Past Psychiatric History:  Previous Medication Trials: Yes - uncertain which medications  Previous Psychiatric Hospitalizations: Yes - [facility] April - November 2018  Previous Suicide Attempts: No  History of Violence: No  Outpatient Psychiatrist: Yes - In [place], uncertain name  Outpatient Psychotherapist: Yes - In [place], uncertain name    Social History:  Marital Status: not   Children: 0   Employment Status/Info: unemployed  Education: 10th grade  Special Ed: no  Housing/Lives with: mom, sister (Amanda)  History of phys/sexual abuse: No  Access to gun: No    Substance Abuse History:  Recreational Drugs: History of heroin, prescription opioids  Use of Alcohol: denied  Tobacco Use:yes 2 ppd    Functioning Relationships: good support system    Psychosocial Factors:  Peer group, social, ethic, cultural, emotional, and health factors: lives with family  Living situation, family constellation, family circumstances/home: lives with family, sister provides significant support    Hospital Course: No notes on file         Patient History           Medical as of 12/13/2018     Past Medical History     Diagnosis Date Comments Source    ADHD (attention deficit hyperactivity disorder) 2/7/2012 -- Provider    Anxiety -- -- Provider    Bipolar 1 disorder 2/7/2012 -- Provider    Depression -- -- Provider    GERD (gastroesophageal reflux disease) -- -- Provider                  Surgical as of 12/13/2018     Past Surgical History     Procedure Laterality Date Comments Source    ESOPHAGOGASTRODUODENOSCOPY -- -- -- Provider                  Family as of 12/13/2018     Problem Relation Name Age of Onset Comments Source    ADD / ADHD Neg Hx -- -- -- Provider    Alcohol abuse Neg Hx -- -- -- Provider    Anxiety disorder Neg Hx -- -- -- Provider    Bipolar disorder Neg Hx  -- -- -- Provider    Dementia Neg Hx -- -- -- Provider    Depression Neg Hx -- -- -- Provider    Drug abuse Neg Hx -- -- -- Provider    OCD Neg Hx -- -- -- Provider    Paranoid behavior Neg Hx -- -- -- Provider    Physical abuse Neg Hx -- -- -- Provider    Schizophrenia Neg Hx -- -- -- Provider    Seizures Neg Hx -- -- -- Provider    Self injury Neg Hx -- -- -- Provider    Sexual abuse Neg Hx -- -- -- Provider    Suicide Neg Hx -- -- -- Provider            Tobacco Use as of 12/13/2018     Smoking Status Smoking Start Date Smoking Quit Date Packs/Day Years Used    Current Every Day Smoker -- -- 1.00 10.00    Types Comments Smokeless Tobacco Status Smokeless Tobacco Quit Date Source     Cigarettes -- Never Used -- Provider            Alcohol Use as of 12/13/2018     Alcohol Use Drinks/Week Alcohol/Week Comments Source    No -- -- -- Provider    Frequency Standard Drinks Binge Drinking Source      -- -- -- Provider             Drug Use as of 12/13/2018     Drug Use Types Frequency Comments Source    No -- -- history of MJ at age 17, pain pills, mushrooms, Sober for last 1.5 years Provider            Sexual Activity as of 12/13/2018     Sexually Active Birth Control Partners Comments Source    Yes -- Female -- Provider            Activities of Daily Living as of 12/13/2018     Activities of Daily Living Question Response Comments Source    Patient feels they ought to cut down on drinking/drug use Not Asked -- Provider    Patient annoyed by others criticizing their drinking/drug use Not Asked -- Provider    Patient has felt bad or guilty about drinking/drug use Not Asked -- Provider    Patient has had a drink/used drugs as an eye opener in the AM Not Asked -- Provider            Social Documentation as of 12/13/2018    None           Occupational as of 12/13/2018    None           Socioeconomic as of 12/13/2018     Marital Status Spouse Name Number of Children Years Education Education Level Preferred Language Ethnicity  Race Source    Single -- -- -- -- English /White White Provider    Financial Resource Strain Food Insecurity: Worry Food Insecurity: Inability Transportation Needs: Medical Transportation Needs: Non-medical       -- -- -- -- --             Pertinent History     Question Response Comments    Lives with significant other GF    Place in Birth Order -- --    Lives in home --    Number of Siblings -- --    Raised by -- --    Legal Involvement -- --    Childhood Trauma -- --    Criminal History of arrest tresspassing 18/19    Financial Status employed back     Highest Level of Education high school graduation --    Does patient have access to a firearm? -- --     Service No --    Primary Leisure Activity -- --    Spirituality spiritual without formal affiliation --        Past Medical History:   Diagnosis Date    ADHD (attention deficit hyperactivity disorder) 2/7/2012    Anxiety     Bipolar 1 disorder 2/7/2012    Depression     GERD (gastroesophageal reflux disease)      Past Surgical History:   Procedure Laterality Date    EGD (ESOPHAGOGASTRODUODENOSCOPY) N/A 9/14/2012    Performed by [facility] at [facility] ENDO    ESOPHAGOGASTRODUODENOSCOPY       Family History     None        Tobacco Use    Smoking status: Current Every Day Smoker     Packs/day: 1.00     Years: 10.00     Pack years: 10.00     Types: Cigarettes    Smokeless tobacco: Never Used   Substance and Sexual Activity    Alcohol use: No    Drug use: No     Comment: history of MJ at age 17, pain pills, mushrooms, Sober for last 1.5 years    Sexual activity: Yes     Partners: Female     Review of patient's allergies indicates:  No Known Allergies    No current facility-administered medications on file prior to encounter.      Current Outpatient Medications on File Prior to Encounter   Medication Sig    acyclovir (ZOVIRAX) 400 MG tablet Take 400 mg by mouth 3 (three) times daily.     amitriptyline (ELAVIL) 100 MG  tablet Take 100 mg by mouth nightly as needed (sleep).    buPROPion (WELLBUTRIN XL) 300 MG 24 hr tablet Take 450 mg by mouth once daily.     cyanocobalamin (VITAMIN B-12) 1000 MCG tablet Take 5,000 mcg by mouth once daily.    cyclobenzaprine (FLEXERIL) 10 MG tablet Take 10 mg by mouth 2 (two) times daily.     escitalopram oxalate (LEXAPRO) 10 MG tablet Take 10 mg by mouth once daily.    ferrous sulfate 324 mg (65 mg iron) TbEC Take 325 mg by mouth once daily.    finasteride (PROSCAR) 5 mg tablet Take 5 mg by mouth once daily.    gabapentin (NEURONTIN) 300 MG capsule Take 900 mg by mouth 3 (three) times daily.    melatonin 3 mg Tab Take 3 mg by mouth nightly.    multivitamin (THERAGRAN) per tablet Take 1 tablet by mouth once daily.    omeprazole (PRILOSEC) 20 MG capsule Take 20 mg by mouth every morning.    OXcarbazepine (TRILEPTAL) 600 MG Tab 300 mg 2 (two) times daily.     sulfamethoxazole-trimethoprim 800-160mg (BACTRIM DS) 800-160 mg Tab Take 1 tablet by mouth every 12 (twelve) hours.    sumatriptan (IMITREX) 50 MG tablet Take 50 mg by mouth every 2 (two) hours as needed for Migraine (May repeat dose in 2 hours).    tamsulosin (FLOMAX) 0.4 mg Cap Take 0.4 mg by mouth once daily.    topiramate (TOPAMAX) 25 MG tablet Take 25 mg by mouth 2 (two) times daily.    ziprasidone (GEODON) 80 MG capsule Take 80 mg by mouth 2 (two) times daily with meals. Per [facility] Discharge/Continuing care plan. Take at 12:50 and 8pm     Psychotherapeutics (From admission, onward)    Start     Stop Route Frequency Ordered    12/13/18 0900  buPROPion TB24 tablet 450 mg      -- Oral Daily 12/12/18 1643    12/13/18 0900  escitalopram oxalate tablet 10 mg      -- Oral Daily 12/12/18 1643    12/12/18 2100  ziprasidone capsule 80 mg      -- Oral 2 times daily 12/12/18 1643    12/12/18 1642  amitriptyline tablet 100 mg      -- Oral Nightly PRN 12/12/18 1643    12/12/18 1302  haloperidol lactate injection 2 mg       "-- IV Every 4 hours PRN 12/12/18 1203    12/10/18 1615  dexmedetomidine (PRECEDEX) 400mcg/100mL 0.9% NaCL infusion  (Second Choice (Under-sedated with analgesia medications) (propofol or precedex))     Question Answer Comment   Titrate by (in mcg/kg/hr): 0.1    Titrate interval: (in minutes) 30    To maintain a RASS score of: RASS (-4) Deep sedation - No response to voice, but movement or eye opening to physical stimulation    Maximum dose of (in mcg/kg/hr): 1.4        -- IV Continuous 12/10/18 1504        Review of Systems   Unable to perform ROS: Psychiatric disorder   Constitutional: Negative for fever.   Respiratory: Positive for shortness of breath.    Psychiatric/Behavioral: Positive for agitation, behavioral problems and confusion. Negative for suicidal ideas.     Strengths and Liabilities: Liability: Patient has poor health.    Objective:     Vital Signs (Most Recent):  Temp: 98.3 °F (36.8 °C) (12/13/18 1500)  Pulse: 105 (12/13/18 1600)  Resp: (!) 31 (12/13/18 1600)  BP: (!) 116/59 (12/13/18 1600)  SpO2: 98 % (12/13/18 1600) Vital Signs (24h Range):  Temp:  [97.8 °F (36.6 °C)-99.5 °F (37.5 °C)] 98.3 °F (36.8 °C)  Pulse:  [] 105  Resp:  [13-40] 31  SpO2:  [90 %-100 %] 98 %  BP: ()/(46-80) 116/59  Arterial Line BP: ()/(52-84) 111/62     Height: 5' 11" (180.3 cm)  Weight: 74.4 kg (164 lb)  Body mass index is 22.87 kg/m².      Intake/Output Summary (Last 24 hours) at 12/13/2018 1609  Last data filed at 12/13/2018 1600  Gross per 24 hour   Intake 2703.69 ml   Output 3145 ml   Net -441.31 ml       Physical Exam   Constitutional: He appears well-developed. No distress.   Cardiovascular: Normal rate and regular rhythm. Exam reveals no friction rub.   No murmur heard.  Pulmonary/Chest:   Increased work of breathing   Abdominal: Soft. He exhibits no distension.   Psychiatric:   Mental Status Exam:    Exam limited by pt refusing to participate     Appearance: unremarkable, older than stated age  Level " of Consciousness: Alert  Behavior/Cooperation: reluctant to participate, uncooperative  Psychomotor: psychomotor retardation   Speech: bradyphrenic  Language: english, fluid  Orientation: oriented to month, year.  Not oriented to situation, place, day  Attention Span/Concentration: refuses to participate  Memory: refuses to participate  Mood: refuses to participate  Affect: normal and flat  Thought Process: poverty of thought  Associations: normal and logical, poverty of thought  Thought Content: no SIHI  Fund of Knowledge: unable to assess  Abstraction: unable to assess  Insight: poor, limited  Judgment: poor, limited          Significant Labs:   Recent Results (from the past 12 hour(s))   CBC auto differential    Collection Time: 12/13/18  4:35 AM   Result Value Ref Range    WBC 8.35 3.90 - 12.70 K/uL    RBC 3.20 (L) 4.60 - 6.20 M/uL    Hemoglobin 10.0 (L) 14.0 - 18.0 g/dL    Hematocrit 30.1 (L) 40.0 - 54.0 %    MCV 94 82 - 98 fL    MCH 31.3 (H) 27.0 - 31.0 pg    MCHC 33.2 32.0 - 36.0 g/dL    RDW 12.7 11.5 - 14.5 %    Platelets 293 150 - 350 K/uL    MPV 9.2 9.2 - 12.9 fL    Immature Granulocytes 2.9 (H) 0.0 - 0.5 %    Gran # (ANC) 5.6 1.8 - 7.7 K/uL    Immature Grans (Abs) 0.24 (H) 0.00 - 0.04 K/uL    Lymph # 1.6 1.0 - 4.8 K/uL    Mono # 0.8 0.3 - 1.0 K/uL    Eos # 0.1 0.0 - 0.5 K/uL    Baso # 0.01 0.00 - 0.20 K/uL    nRBC 0 0 /100 WBC    Gran% 66.9 38.0 - 73.0 %    Lymph% 19.5 18.0 - 48.0 %    Mono% 9.0 4.0 - 15.0 %    Eosinophil% 1.6 0.0 - 8.0 %    Basophil% 0.1 0.0 - 1.9 %    Differential Method Automated    Comprehensive metabolic panel    Collection Time: 12/13/18  4:35 AM   Result Value Ref Range    Sodium 139 136 - 145 mmol/L    Potassium 4.1 3.5 - 5.1 mmol/L    Chloride 106 95 - 110 mmol/L    CO2 23 23 - 29 mmol/L    Glucose 77 70 - 110 mg/dL    BUN, Bld 16 6 - 20 mg/dL    Creatinine 0.6 0.5 - 1.4 mg/dL    Calcium 8.3 (L) 8.7 - 10.5 mg/dL    Total Protein 5.7 (L) 6.0 - 8.4 g/dL    Albumin 2.6 (L) 3.5 - 5.2  g/dL    Total Bilirubin 0.8 0.1 - 1.0 mg/dL    Alkaline Phosphatase 51 (L) 55 - 135 U/L    AST 50 (H) 10 - 40 U/L    ALT 80 (H) 10 - 44 U/L    Anion Gap 10 8 - 16 mmol/L    eGFR if African American >60.0 >60 mL/min/1.73 m^2    eGFR if non African American >60.0 >60 mL/min/1.73 m^2   Magnesium    Collection Time: 18  4:35 AM   Result Value Ref Range    Magnesium 1.9 1.6 - 2.6 mg/dL   Phosphorus    Collection Time: 18  4:35 AM   Result Value Ref Range    Phosphorus 1.9 (L) 2.7 - 4.5 mg/dL   POCT glucose    Collection Time: 18 11:57 AM   Result Value Ref Range    POCT Glucose 89 70 - 110 mg/dL         Significant Imagin18 EKG: Per independent resident review and interpretation,  Sinus tachycardia (104).  QTc 457.      Assessment/Plan:     Acute metabolic encephalopathy    See bipolar section for discussion.     Bipolar disorder    Ashwin Madrid is a 38 y.o. male with a past psychiatric history of bipolar disorder (unspecified), ADHD, polysubstance abuse (prescription opioids, heroin), currently presenting as a transfer from [facility] on  for management of ARDS.  Psychiatry was consulted to address the patient's symptoms of management of bipolar medications.     Given haldol 2 mg x3 over the last few days and restarted on home medications.  Given complicated hospital course, waxing/waning symptoms, suspect a component of delirium in the setting of underlying bipolar disorder.      Home medications on sheet from recent discharge from [place], and endorsed to be accurate per pt's sister:  Trileptal 300 BID  Melatonin 3 mg QHS  Elavil 100 mg QHS PRN  Gabapentin 900 TID   Lexapro 10 mg Qdaily  Topamax 25 mg BID  Wellbutrin  mg Qdaily  Geodon 80 at 1250 and 2000 daily    Recommendations  -START trileptal 150 mg BID  -DECREASE haldol frequency to 2 mg PRN Q8hrs  -Continue wellbutrin  Qdaily, lexapro 10 mg Qdaily, gabapentin 900 TID, and ziprasidone 80 mg  BID.  -Wean precedex as tolerated  -EKG Qdaily, caution with antipsychotic use with QTc >500  -Ensure that geodon is given with food for proper absorption      Delirium Precautions  -Re-orient patient frequently and keep pt's whiteboard up to date with current day and team member's names  -Keep shades open/room lit during day  -Low light at night (close blinds at night)  -Low stimulation, minimize interruptions at night  -Minimize use of restraints  -Encourage family to be at bedside  -Avoid opiates, benzodiazepines, antihistamines, anticholinergics, hypnotics as much as possible      Case discussed with psychiatry staff, Dr. Weaver.          Total Time:  60 minutes      David Purcell MD   Psychiatry  [facility]

## 2018-12-13 NOTE — ASSESSMENT & PLAN NOTE
- AST of 159 and ALT of 59  - EtOH <10  - Thiamine and folate replacement  - liver enzymes improving.

## 2018-12-13 NOTE — ASSESSMENT & PLAN NOTE
- multifactorial from sedation, infection, steroids  - continues to be agitated following extubation requiring precedex infusion and prn haldol.   - steroids discontinued   - restarted on home antipsychotic regimen, psyc consulted   - hx of etoh abuse, CIWA monitoring

## 2018-12-13 NOTE — SUBJECTIVE & OBJECTIVE
Interval History/Significant Events: Intermittent agitation requiring precedex gtt. Hypotensive episode that resolved with 1L bolus. Awake and appropriate this morning.     Review of Systems   Constitutional: Positive for fatigue. Negative for diaphoresis and fever.   HENT: Negative for congestion and trouble swallowing.    Eyes: Negative for photophobia and visual disturbance.   Respiratory: Negative for cough, chest tightness and shortness of breath.    Cardiovascular: Negative for chest pain, palpitations and leg swelling.   Gastrointestinal: Negative for abdominal distention, nausea and vomiting.   Psychiatric/Behavioral: Positive for agitation (intermittent). Negative for confusion. The patient is nervous/anxious.      Objective:     Vital Signs (Most Recent):  Temp: 97.8 °F (36.6 °C) (12/13/18 1100)  Pulse: 86 (12/13/18 1300)  Resp: 17 (12/13/18 1300)  BP: 101/66 (12/13/18 1300)  SpO2: 97 % (12/13/18 1300) Vital Signs (24h Range):  Temp:  [97.8 °F (36.6 °C)-99.5 °F (37.5 °C)] 97.8 °F (36.6 °C)  Pulse:  [] 86  Resp:  [11-40] 17  SpO2:  [90 %-97 %] 97 %  BP: ()/(46-85) 101/66  Arterial Line BP: ()/(52-84) 111/62   Weight: 74.4 kg (164 lb)  Body mass index is 22.87 kg/m².      Intake/Output Summary (Last 24 hours) at 12/13/2018 1403  Last data filed at 12/13/2018 1300  Gross per 24 hour   Intake 2110.89 ml   Output 3050 ml   Net -939.11 ml       Physical Exam   Constitutional: He is oriented to person, place, and time. He appears well-developed and well-nourished. No distress.   HENT:   Head: Normocephalic and atraumatic.   Eyes: Conjunctivae and EOM are normal. Pupils are equal, round, and reactive to light. No scleral icterus.   Neck: No JVD present. No tracheal deviation present.   Cardiovascular: Normal rate, regular rhythm and normal heart sounds.   Pulses:       Radial pulses are 2+ on the right side, and 2+ on the left side.        Dorsalis pedis pulses are 2+ on the right side, and 2+ on  the left side.   Pulmonary/Chest: Effort normal and breath sounds normal. No respiratory distress. He has no wheezes. He has no rales.   Abdominal: Soft. Bowel sounds are normal. There is no tenderness. There is no guarding.   Genitourinary:   Genitourinary Comments: Neri with yellow output   Neurological: He is alert and oriented to person, place, and time. No cranial nerve deficit. GCS eye subscore is 3. GCS verbal subscore is 5. GCS motor subscore is 6.   PERRL 3mm beau. AAO X3. Moves all extremities spontaneously, follows all commands.    Skin: Skin is warm and dry. Capillary refill takes less than 2 seconds. He is not diaphoretic.   Psychiatric: He has a normal mood and affect. His behavior is normal.   Nursing note and vitals reviewed.      Lines/Drains/Airways     Central Venous Catheter Line                 Percutaneous Central Line Insertion/Assessment - triple lumen  12/08/18 0525 right internal jugular 5 days          Drain                 Urethral Catheter 12/09/18 0523 Latex 18 Fr. 4 days              Significant Labs:    CBC/Anemia Profile:  Recent Labs   Lab 12/12/18  0352 12/13/18  0435   WBC 10.39 8.35   HGB 9.5* 10.0*   HCT 29.9* 30.1*    293   MCV 96 94   RDW 13.2 12.7        Chemistries:  Recent Labs   Lab 12/12/18  0352 12/12/18  1751 12/12/18  2114 12/13/18  0435     --  140 139   K 3.6  --  3.2* 4.1     --  102 106   CO2 34*  --  31* 23   BUN 23*  --  20 16   CREATININE 0.7  --  0.7 0.6   CALCIUM 8.6*  --  8.4* 8.3*   ALBUMIN 2.5*  --  2.6* 2.6*   PROT 5.9*  --   --  5.7*   BILITOT 0.6  --   --  0.8   ALKPHOS 45*  --   --  51*   ALT 70*  --   --  80*   AST 42*  --   --  50*   MG 2.1  --  1.5* 1.9   PHOS 2.7 2.7 1.9* 1.9*       All pertinent labs within the past 24 hours have been reviewed.    Significant Imaging:  I have reviewed all pertinent imaging results/findings within the past 24 hours.

## 2018-12-13 NOTE — ASSESSMENT & PLAN NOTE
- QTc 457  - restarted on home medications psyc consulted for med management    - haldol Q 8 hours as needed for agitation  - daily EKGs to evaluate QTc

## 2018-12-13 NOTE — SUBJECTIVE & OBJECTIVE
Patient History           Medical as of 12/13/2018     Past Medical History     Diagnosis Date Comments Source    ADHD (attention deficit hyperactivity disorder) 2/7/2012 -- Provider    Anxiety -- -- Provider    Bipolar 1 disorder 2/7/2012 -- Provider    Depression -- -- Provider    GERD (gastroesophageal reflux disease) -- -- Provider                  Surgical as of 12/13/2018     Past Surgical History     Procedure Laterality Date Comments Source    ESOPHAGOGASTRODUODENOSCOPY -- -- -- Provider                  Family as of 12/13/2018     Problem Relation Name Age of Onset Comments Source    ADD / ADHD Neg Hx -- -- -- Provider    Alcohol abuse Neg Hx -- -- -- Provider    Anxiety disorder Neg Hx -- -- -- Provider    Bipolar disorder Neg Hx -- -- -- Provider    Dementia Neg Hx -- -- -- Provider    Depression Neg Hx -- -- -- Provider    Drug abuse Neg Hx -- -- -- Provider    OCD Neg Hx -- -- -- Provider    Paranoid behavior Neg Hx -- -- -- Provider    Physical abuse Neg Hx -- -- -- Provider    Schizophrenia Neg Hx -- -- -- Provider    Seizures Neg Hx -- -- -- Provider    Self injury Neg Hx -- -- -- Provider    Sexual abuse Neg Hx -- -- -- Provider    Suicide Neg Hx -- -- -- Provider            Tobacco Use as of 12/13/2018     Smoking Status Smoking Start Date Smoking Quit Date Packs/Day Years Used    Current Every Day Smoker -- -- 1.00 10.00    Types Comments Smokeless Tobacco Status Smokeless Tobacco Quit Date Source     Cigarettes -- Never Used -- Provider            Alcohol Use as of 12/13/2018     Alcohol Use Drinks/Week Alcohol/Week Comments Source    No -- -- -- Provider    Frequency Standard Drinks Binge Drinking Source      -- -- -- Provider             Drug Use as of 12/13/2018     Drug Use Types Frequency Comments Source    No -- -- history of MJ at age 17, pain pills, mushrooms, Sober for last 1.5 years Provider            Sexual Activity as of 12/13/2018     Sexually Active Birth Control Partners  Comments Source    Yes -- Female -- Provider            Activities of Daily Living as of 12/13/2018     Activities of Daily Living Question Response Comments Source    Patient feels they ought to cut down on drinking/drug use Not Asked -- Provider    Patient annoyed by others criticizing their drinking/drug use Not Asked -- Provider    Patient has felt bad or guilty about drinking/drug use Not Asked -- Provider    Patient has had a drink/used drugs as an eye opener in the AM Not Asked -- Provider            Social Documentation as of 12/13/2018    None           Occupational as of 12/13/2018    None           Socioeconomic as of 12/13/2018     Marital Status Spouse Name Number of Children Years Education Education Level Preferred Language Ethnicity Race Source    Single -- -- -- -- English /White White Provider    Financial Resource Strain Food Insecurity: Worry Food Insecurity: Inability Transportation Needs: Medical Transportation Needs: Non-medical       -- -- -- -- --             Pertinent History     Question Response Comments    Lives with significant other GF    Place in Birth Order -- --    Lives in home --    Number of Siblings -- --    Raised by -- --    Legal Involvement -- --    Childhood Trauma -- --    Criminal History of arrest [facility] 18/19    Financial Status employed back     Highest Level of Education high school graduation --    Does patient have access to a firearm? -- --     Service No --    Primary Leisure Activity -- --    Spirituality spiritual without formal affiliation --        Past Medical History:   Diagnosis Date    ADHD (attention deficit hyperactivity disorder) 2/7/2012    Anxiety     Bipolar 1 disorder 2/7/2012    Depression     GERD (gastroesophageal reflux disease)      Past Surgical History:   Procedure Laterality Date    EGD (ESOPHAGOGASTRODUODENOSCOPY) N/A 9/14/2012    Per[facility] by Juancarlos So Jr[facility] at [facility]     ESOPHAGOGASTRODUODENOSCOPY       Family History     None        Tobacco Use    Smoking status: Current Every Day Smoker     Packs/day: 1.00     Years: 10.00     Pack years: 10.00     Types: Cigarettes    Smokeless tobacco: Never Used   Substance and Sexual Activity    Alcohol use: No    Drug use: No     Comment: history of MJ at age 17, pain pills, mushrooms, Sober for last 1.5 years    Sexual activity: Yes     Partners: Female     Review of patient's allergies indicates:  No Known Allergies    No current facility-administered medications on file prior to encounter.      Current Outpatient Medications on File Prior to Encounter   Medication Sig    acyclovir (ZOVIRAX) 400 MG tablet Take 400 mg by mouth 3 (three) times daily.     amitriptyline (ELAVIL) 100 MG tablet Take 100 mg by mouth nightly as needed (sleep).    buPROPion (WELLBUTRIN XL) 300 MG 24 hr tablet Take 450 mg by mouth once daily.     cyanocobalamin (VITAMIN B-12) 1000 MCG tablet Take 5,000 mcg by mouth once daily.    cyclobenzaprine (FLEXERIL) 10 MG tablet Take 10 mg by mouth 2 (two) times daily.     escitalopram oxalate (LEXAPRO) 10 MG tablet Take 10 mg by mouth once daily.    ferrous sulfate 324 mg (65 mg iron) TbEC Take 325 mg by mouth once daily.    finasteride (PROSCAR) 5 mg tablet Take 5 mg by mouth once daily.    gabapentin (NEURONTIN) 300 MG capsule Take 900 mg by mouth 3 (three) times daily.    melatonin 3 mg Tab Take 3 mg by mouth nightly.    multivitamin (THERAGRAN) per tablet Take 1 tablet by mouth once daily.    omeprazole (PRILOSEC) 20 MG capsule Take 20 mg by mouth every morning.    OXcarbazepine (TRILEPTAL) 600 MG Tab 300 mg 2 (two) times daily.     sulfamethoxazole-trimethoprim 800-160mg (BACTRIM DS) 800-160 mg Tab Take 1 tablet by mouth every 12 (twelve) hours.    sumatriptan (IMITREX) 50 MG tablet Take 50 mg by mouth every 2 (two) hours as needed for Migraine (May repeat dose in 2 hours).    tamsulosin (FLOMAX) 0.4  mg Cap Take 0.4 mg by mouth once daily.    topiramate (TOPAMAX) 25 MG tablet Take 25 mg by mouth 2 (two) times daily.    ziprasidone (GEODON) 80 MG capsule Take 80 mg by mouth 2 (two) times daily with meals. Per [facility] Discharge/Continuing care plan. Take at 12:50 and 8pm     Psychotherapeutics (From admission, onward)    Start     Stop Route Frequency Ordered    12/13/18 0900  buPROPion TB24 tablet 450 mg      -- Oral Daily 12/12/18 1643    12/13/18 0900  escitalopram oxalate tablet 10 mg      -- Oral Daily 12/12/18 1643    12/12/18 2100  ziprasidone capsule 80 mg      -- Oral 2 times daily 12/12/18 1643    12/12/18 1642  amitriptyline tablet 100 mg      -- Oral Nightly PRN 12/12/18 1643    12/12/18 1302  haloperidol lactate injection 2 mg      -- IV Every 4 hours PRN 12/12/18 1203    12/10/18 1615  dexmedetomidine (PRECEDEX) 400mcg/100mL 0.9% NaCL infusion  (Second Choice (Under-sedated with analgesia medications) (propofol or precedex))     Question Answer Comment   Titrate by (in mcg/kg/hr): 0.1    Titrate interval: (in minutes) 30    To maintain a RASS score of: RASS (-4) Deep sedation - No response to voice, but movement or eye opening to physical stimulation    Maximum dose of (in mcg/kg/hr): 1.4        -- IV Continuous 12/10/18 1504        Review of Systems   Unable to perform ROS: Psychiatric disorder   Constitutional: Negative for fever.   Respiratory: Positive for shortness of breath.    Psychiatric/Behavioral: Positive for agitation, behavioral problems and confusion. Negative for suicidal ideas.     Strengths and Liabilities: Liability: Patient has poor health.    Objective:     Vital Signs (Most Recent):  Temp: 98.3 °F (36.8 °C) (12/13/18 1500)  Pulse: 105 (12/13/18 1600)  Resp: (!) 31 (12/13/18 1600)  BP: (!) 116/59 (12/13/18 1600)  SpO2: 98 % (12/13/18 1600) Vital Signs (24h Range):  Temp:  [97.8 °F (36.6 °C)-99.5 °F (37.5 °C)] 98.3 °F (36.8 °C)  Pulse:  [] 105  Resp:  [13-40]  "31  SpO2:  [90 %-100 %] 98 %  BP: ()/(46-80) 116/59  Arterial Line BP: ()/(52-84) 111/62     Height: 5' 11" (180.3 cm)  Weight: 74.4 kg (164 lb)  Body mass index is 22.87 kg/m².      Intake/Output Summary (Last 24 hours) at 12/13/2018 1609  Last data filed at 12/13/2018 1600  Gross per 24 hour   Intake 2703.69 ml   Output 3145 ml   Net -441.31 ml       Physical Exam   Constitutional: He appears well-developed. No distress.   Cardiovascular: Normal rate and regular rhythm. Exam reveals no friction rub.   No murmur heard.  Pulmonary/Chest:   Increased work of breathing   Abdominal: Soft. He exhibits no distension.   Psychiatric:   Mental Status Exam:    Exam limited by pt refusing to participate     Appearance: unremarkable, older than stated age  Level of Consciousness: Alert  Behavior/Cooperation: reluctant to participate, uncooperative  Psychomotor: psychomotor retardation   Speech: bradyphrenic  Language: english, fluid  Orientation: oriented to month, year.  Not oriented to situation, place, day  Attention Span/Concentration: refuses to participate  Memory: refuses to participate  Mood: refuses to participate  Affect: normal and flat  Thought Process: poverty of thought  Associations: normal and logical, poverty of thought  Thought Content: no SIHI  Fund of Knowledge: unable to assess  Abstraction: unable to assess  Insight: poor, limited  Judgment: poor, limited          Significant Labs:   Recent Results (from the past 12 hour(s))   CBC auto differential    Collection Time: 12/13/18  4:35 AM   Result Value Ref Range    WBC 8.35 3.90 - 12.70 K/uL    RBC 3.20 (L) 4.60 - 6.20 M/uL    Hemoglobin 10.0 (L) 14.0 - 18.0 g/dL    Hematocrit 30.1 (L) 40.0 - 54.0 %    MCV 94 82 - 98 fL    MCH 31.3 (H) 27.0 - 31.0 pg    MCHC 33.2 32.0 - 36.0 g/dL    RDW 12.7 11.5 - 14.5 %    Platelets 293 150 - 350 K/uL    MPV 9.2 9.2 - 12.9 fL    Immature Granulocytes 2.9 (H) 0.0 - 0.5 %    Gran # (ANC) 5.6 1.8 - 7.7 K/uL    " Immature Grans (Abs) 0.24 (H) 0.00 - 0.04 K/uL    Lymph # 1.6 1.0 - 4.8 K/uL    Mono # 0.8 0.3 - 1.0 K/uL    Eos # 0.1 0.0 - 0.5 K/uL    Baso # 0.01 0.00 - 0.20 K/uL    nRBC 0 0 /100 WBC    Gran% 66.9 38.0 - 73.0 %    Lymph% 19.5 18.0 - 48.0 %    Mono% 9.0 4.0 - 15.0 %    Eosinophil% 1.6 0.0 - 8.0 %    Basophil% 0.1 0.0 - 1.9 %    Differential Method Automated    Comprehensive metabolic panel    Collection Time: 18  4:35 AM   Result Value Ref Range    Sodium 139 136 - 145 mmol/L    Potassium 4.1 3.5 - 5.1 mmol/L    Chloride 106 95 - 110 mmol/L    CO2 23 23 - 29 mmol/L    Glucose 77 70 - 110 mg/dL    BUN, Bld 16 6 - 20 mg/dL    Creatinine 0.6 0.5 - 1.4 mg/dL    Calcium 8.3 (L) 8.7 - 10.5 mg/dL    Total Protein 5.7 (L) 6.0 - 8.4 g/dL    Albumin 2.6 (L) 3.5 - 5.2 g/dL    Total Bilirubin 0.8 0.1 - 1.0 mg/dL    Alkaline Phosphatase 51 (L) 55 - 135 U/L    AST 50 (H) 10 - 40 U/L    ALT 80 (H) 10 - 44 U/L    Anion Gap 10 8 - 16 mmol/L    eGFR if African American >60.0 >60 mL/min/1.73 m^2    eGFR if non African American >60.0 >60 mL/min/1.73 m^2   Magnesium    Collection Time: 18  4:35 AM   Result Value Ref Range    Magnesium 1.9 1.6 - 2.6 mg/dL   Phosphorus    Collection Time: 18  4:35 AM   Result Value Ref Range    Phosphorus 1.9 (L) 2.7 - 4.5 mg/dL   POCT glucose    Collection Time: 18 11:57 AM   Result Value Ref Range    POCT Glucose 89 70 - 110 mg/dL         Significant Imagin/12/18 EKG: Per independent resident review and interpretation,  Sinus tachycardia (104).  QTc 457.

## 2018-12-13 NOTE — PROGRESS NOTES
[facility]  Critical Care Medicine  Progress Note    Patient Name: Ashwin Madrid  MRN: [id number]  Admission Date: 12/9/2018  Hospital Length of Stay: 4 days  Code Status: Full Code  Attending Provider: Jv Nicole*  Primary Care Provider: Primary Doctor No   Principal Problem: ARDS (adult respiratory distress syndrome)    Subjective:     HPI:  Mr Madrid is a 39 yo M w PMHx significant for COPD (nil PFT), bipolar disorder, ADHD, polysubstance abuse and GERD who [place] [facility] [place] [facility] d/t concern for ARDS requiring proning.     As patient was intubated on arrival, the majority of the history was collected from transfer paperwork and chart review. Pt presented to OSH with 3-day history of progressively worsening cough, fever and shortness of breath with associated decrease in PO intake and urine output. On admission, he denied IVDU and stated that he had abstained from his drug of choice (methamphetamine). On admission to OSH, he was noted to have leukocytosis with WBC of 21.4, fever of 102.7F, tachycardia of 127, tachypnea of 41 and hypoxemia with SpO2 of 84%. Imaging studies revealed RLL infiltrate concerning for community-acquired pneumonia.     Over the course of his 24 hours at OSH, his supplementary oxygen requirements steadily increased from nasal cannula to nonrebreather to BiPAP to ultimately intubation. Repeat CXR revealed development of bilateral infiltrates concerning for ARDS; [id number] ratio was 54 and he was paralyzed with intermittent rocuronium.     Labs at OSH also revealed a transaminitis with AST of 186 and ALT of 48.     Hospital/ICU Course:  Transferred to MICU on 12/9 intubated, paralyzed, and sedated. Prone positioning for P:F 54. Supinated on 12/10 at 0930, P:F 200. Paralytic discontinued with severe agitation on fentanyl, versed, precedex, and dilaudid pushes. Low dose propofol used for a short time, then changed to ketamine.  Vent weaned according to ARDSnet protocol. Bipolar meds restarted on 12/11. Diuresed aggressively with electrolyte replacement. Versed discontinued on 12/11. Extubated to comfort flow on 12/12.  Ketamine and Fentanyl infusions discontinued. Haldol ordered prn.  Updated home medication list with most recent medication regimen provided by patient's family. Intermittent agitation requiring precedex infusion and sitter. Psyc consulted for management of bipolar medications.        Interval History/Significant Events: Intermittent agitation requiring precedex gtt. Hypotensive episode that resolved with 1L bolus. Awake and appropriate this morning.     Review of Systems   Constitutional: Positive for fatigue. Negative for diaphoresis and fever.   HENT: Negative for congestion and trouble swallowing.    Eyes: Negative for photophobia and visual disturbance.   Respiratory: Negative for cough, chest tightness and shortness of breath.    Cardiovascular: Negative for chest pain, palpitations and leg swelling.   Gastrointestinal: Negative for abdominal distention, nausea and vomiting.   Psychiatric/Behavioral: Positive for agitation (intermittent). Negative for confusion. The patient is nervous/anxious.      Objective:     Vital Signs (Most Recent):  Temp: 97.8 °F (36.6 °C) (12/13/18 1100)  Pulse: 86 (12/13/18 1300)  Resp: 17 (12/13/18 1300)  BP: 101/66 (12/13/18 1300)  SpO2: 97 % (12/13/18 1300) Vital Signs (24h Range):  Temp:  [97.8 °F (36.6 °C)-99.5 °F (37.5 °C)] 97.8 °F (36.6 °C)  Pulse:  [] 86  Resp:  [11-40] 17  SpO2:  [90 %-97 %] 97 %  BP: ()/(46-85) 101/66  Arterial Line BP: ()/(52-84) 111/62   Weight: 74.4 kg (164 lb)  Body mass index is 22.87 kg/m².      Intake/Output Summary (Last 24 hours) at 12/13/2018 1403  Last data filed at 12/13/2018 1300  Gross per 24 hour   Intake 2110.89 ml   Output 3050 ml   Net -939.11 ml       Physical Exam   Constitutional: He is oriented to person, place, and time. He  appears well-developed and well-nourished. No distress.   HENT:   Head: Normocephalic and atraumatic.   Eyes: Conjunctivae and EOM are normal. Pupils are equal, round, and reactive to light. No scleral icterus.   Neck: No JVD present. No tracheal deviation present.   Cardiovascular: Normal rate, regular rhythm and normal heart sounds.   Pulses:       Radial pulses are 2+ on the right side, and 2+ on the left side.        Dorsalis pedis pulses are 2+ on the right side, and 2+ on the left side.   Pulmonary/Chest: Effort normal and breath sounds normal. No respiratory distress. He has no wheezes. He has no rales.   Abdominal: Soft. Bowel sounds are normal. There is no tenderness. There is no guarding.   Genitourinary:   Genitourinary Comments: Neri with yellow output   Neurological: He is alert and oriented to person, place, and time. No cranial nerve deficit. GCS eye subscore is 3. GCS verbal subscore is 5. GCS motor subscore is 6.   PERRL 3mm beau. AAO X3. Moves all extremities spontaneously, follows all commands.    Skin: Skin is warm and dry. Capillary refill takes less than 2 seconds. He is not diaphoretic.   Psychiatric: He has a normal mood and affect. His behavior is normal.   Nursing note and vitals reviewed.      Lines/Drains/Airways     Central Venous Catheter Line                 Percutaneous Central Line Insertion/Assessment - triple lumen  12/08/18 0525 right internal jugular 5 days          Drain                 Urethral Catheter 12/09/18 0523 Latex 18 Fr. 4 days              Significant Labs:    CBC/Anemia Profile:  Recent Labs   Lab 12/12/18  0352 12/13/18  0435   WBC 10.39 8.35   HGB 9.5* 10.0*   HCT 29.9* 30.1*    293   MCV 96 94   RDW 13.2 12.7        Chemistries:  Recent Labs   Lab 12/12/18  0352 12/12/18  1751 12/12/18  2114 12/13/18  0435     --  140 139   K 3.6  --  3.2* 4.1     --  102 106   CO2 34*  --  31* 23   BUN 23*  --  20 16   CREATININE 0.7  --  0.7 0.6   CALCIUM 8.6*   --  8.4* 8.3*   ALBUMIN 2.5*  --  2.6* 2.6*   PROT 5.9*  --   --  5.7*   BILITOT 0.6  --   --  0.8   ALKPHOS 45*  --   --  51*   ALT 70*  --   --  80*   AST 42*  --   --  50*   MG 2.1  --  1.5* 1.9   PHOS 2.7 2.7 1.9* 1.9*       All pertinent labs within the past 24 hours have been reviewed.    Significant Imaging:  I have reviewed all pertinent imaging results/findings within the past 24 hours.      ABG  Recent Labs   Lab 12/12/18  0807   PH 7.492*   PO2 70*   PCO2 41.8   HCO3 32.0*   BE 9     Assessment/Plan:     Neuro   Acute metabolic encephalopathy    - multifactorial from sedation, infection, steroids  - continues to be agitated following extubation requiring precedex infusion and prn haldol.   - steroids discontinued   - restarted on home antipsychotic regimen, psyc consulted   - hx of etoh abuse, CIWA monitoring      Psychiatric          Bipolar disorder    - QTc 457  - restarted on home medications psyc consulted for med management    - haldol Q 8 hours as needed for agitation  - daily EKGs to evaluate QTc     Pulmonary   * ARDS (adult respiratory distress syndrome)    - Likely 2/2 pneumonia; initial P:F 54  - abx, antiviral, and steroids  - ARDSnet Protocol  - sedated, paralyzed, and proned on 12/9; supinated and paralytic d/c on 12/10  - aggressively diuresised   - respiratory viral panel negative; Oseltamivir discontinued on 12/12.   - extubated to comfort flow on 12/12.  High dose steroids discontinued.   - blood and respiratory cultures no growth to date  - urine legionella negative   - vancomycin discontinued, pip/tazo narrowed to rocephin       Acute hypoxemic respiratory failure    --see ARDS     Pneumonia    - viral vs bacterial   - viral panel negative   - covering with vanc, zosyn, and azithro.  - no growth from cultures, deescalate to rocephin only on 12/13  - tamiflu d/c   - IV solumedrol     Cardiac/Vascular   Hypertriglyceridemia    - Lipase WNL  - downtrending with discontinuation of  propofol.   - continue to trend     Renal/          Immunology/Multi System   Hepatitis C antibody positive in blood    - RNA negative      GI   Transaminitis    - initial AST of 159 and ALT of 59  - EtOH <10  - Thiamine and folate replacement  - liver enzymes improving.                    Critical Care Time: 40 minutes  Critical secondary to Patient has a condition that poses threat to life and bodily function: Acute respiratory distress, Delirium       Critical care was time spent personally by me on the following activities: development of treatment plan with patient or surrogate and bedside caregivers, discussions with consultants, evaluation of patient's response to treatment, examination of patient, ordering and performing treatments and interventions, ordering and review of laboratory studies, ordering and review of radiographic studies, pulse oximetry, re-evaluation of patient's condition. This critical care time did not overlap with that of any other provider or involve time for any procedures.     Amanda Piper, TAQUERIA  Critical Care Medicine  [facility]

## 2018-12-13 NOTE — ASSESSMENT & PLAN NOTE
- initial AST of 159 and ALT of 59  - EtOH <10  - Thiamine and folate replacement  - liver enzymes improving.

## 2018-12-13 NOTE — ASSESSMENT & PLAN NOTE
- viral vs bacterial   - viral panel negative   - covering with vanc, zosyn, and azithro.  - no growth from cultures, deescalate to rocephin only on 12/13  - tamiflu d/c   - IV solumedrol

## 2018-12-13 NOTE — ASSESSMENT & PLAN NOTE
Ashwin Madrid is a 38 y.o. male with a past psychiatric history of bipolar disorder (unspecified), ADHD, polysubstance abuse (prescription opioids, heroin), currently presenting as a transfer from [facility] on 12/9 for management of ARDS.  Psychiatry was consulted to address the patient's symptoms of management of bipolar medications.     Given haldol 2 mg x3 over the last few days and restarted on home medications.  Given complicated hospital course, waxing/waning symptoms, suspect a component of delirium in the setting of underlying bipolar disorder.      Home medications on sheet from recent discharge from [place], and endorsed to be accurate per pt's sister:  Trileptal 300 BID  Melatonin 3 mg QHS  Elavil 100 mg QHS PRN  Gabapentin 900 TID   Lexapro 10 mg Qdaily  Topamax 25 mg BID  Wellbutrin  mg Qdaily  Geodon 80 at 1250 and 2000 daily    Recommendations  -START trileptal 150 mg BID  -DECREASE haldol frequency to 2 mg PRN Q8hrs  -Continue wellbutrin  Qdaily, lexapro 10 mg Qdaily, gabapentin 900 TID, and ziprasidone 80 mg BID.  -Wean precedex as tolerated  -EKG Qdaily  -Ensure that geodon is given with food for proper absorption      Delirium Precautions  -Re-orient patient frequently and keep pt's whiteboard up to date with current day and team member's names  -Keep shades open/room lit during day  -Low light at night (close blinds at night)  -Low stimulation, minimize interruptions at night  -Minimize use of restraints  -Encourage family to be at bedside  -Avoid opiates, benzodiazepines, antihistamines, anticholinergics, hypnotics as much as possible

## 2018-12-14 PROBLEM — F11.10 OPIOID ABUSE: Status: ACTIVE | Noted: 2018-12-14

## 2018-12-14 PROBLEM — F10.10 ALCOHOL ABUSE: Status: ACTIVE | Noted: 2018-12-14

## 2018-12-14 LAB
ALBUMIN SERPL BCP-MCNC: 3.2 G/DL
ALP SERPL-CCNC: 59 U/L
ALT SERPL W/O P-5'-P-CCNC: 72 U/L
ANION GAP SERPL CALC-SCNC: 11 MMOL/L
AST SERPL-CCNC: 30 U/L
BACTERIA BLD CULT: NORMAL
BACTERIA BLD CULT: NORMAL
BASOPHILS NFR BLD: 0 %
BILIRUB SERPL-MCNC: 0.7 MG/DL
BUN SERPL-MCNC: 15 MG/DL
CALCIUM SERPL-MCNC: 9.2 MG/DL
CHLORIDE SERPL-SCNC: 99 MMOL/L
CO2 SERPL-SCNC: 26 MMOL/L
CREAT SERPL-MCNC: 0.6 MG/DL
DIFFERENTIAL METHOD: ABNORMAL
EOSINOPHIL NFR BLD: 11 %
ERYTHROCYTE [DISTWIDTH] IN BLOOD BY AUTOMATED COUNT: 12.9 %
EST. GFR  (AFRICAN AMERICAN): >60 ML/MIN/1.73 M^2
EST. GFR  (NON AFRICAN AMERICAN): >60 ML/MIN/1.73 M^2
GLUCOSE SERPL-MCNC: 86 MG/DL
HCT VFR BLD AUTO: 36.9 %
HGB BLD-MCNC: 11.9 G/DL
IMM GRANULOCYTES # BLD AUTO: ABNORMAL K/UL
IMM GRANULOCYTES NFR BLD AUTO: ABNORMAL %
LYMPHOCYTES NFR BLD: 19 %
MAGNESIUM SERPL-MCNC: 2.1 MG/DL
MCH RBC QN AUTO: 30.5 PG
MCHC RBC AUTO-ENTMCNC: 32.2 G/DL
MCV RBC AUTO: 95 FL
MONOCYTES NFR BLD: 4 %
MYELOCYTES NFR BLD MANUAL: 2 %
NEUTROPHILS NFR BLD: 64 %
NRBC BLD-RTO: 0 /100 WBC
PHOSPHATE SERPL-MCNC: 2.9 MG/DL
PLATELET # BLD AUTO: 396 K/UL
PLATELET BLD QL SMEAR: ABNORMAL
PMV BLD AUTO: 9.2 FL
POTASSIUM SERPL-SCNC: 3.7 MMOL/L
PROT SERPL-MCNC: 7.1 G/DL
RBC # BLD AUTO: 3.9 M/UL
SODIUM SERPL-SCNC: 136 MMOL/L
WBC # BLD AUTO: 8.63 K/UL

## 2018-12-14 PROCEDURE — 25000003 PHARM REV CODE 250: Performed by: NURSE PRACTITIONER

## 2018-12-14 PROCEDURE — 94761 N-INVAS EAR/PLS OXIMETRY MLT: CPT

## 2018-12-14 PROCEDURE — 27000221 HC OXYGEN, UP TO 24 HOURS

## 2018-12-14 PROCEDURE — 84100 ASSAY OF PHOSPHORUS: CPT

## 2018-12-14 PROCEDURE — 93005 ELECTROCARDIOGRAM TRACING: CPT

## 2018-12-14 PROCEDURE — 99291 CRITICAL CARE FIRST HOUR: CPT | Mod: ,,, | Performed by: NURSE PRACTITIONER

## 2018-12-14 PROCEDURE — 80053 COMPREHEN METABOLIC PANEL: CPT

## 2018-12-14 PROCEDURE — 63600175 PHARM REV CODE 636 W HCPCS: Mod: JG | Performed by: INTERNAL MEDICINE

## 2018-12-14 PROCEDURE — 63600175 PHARM REV CODE 636 W HCPCS: Performed by: NURSE PRACTITIONER

## 2018-12-14 PROCEDURE — 99232 SBSQ HOSP IP/OBS MODERATE 35: CPT | Mod: ,,, | Performed by: PSYCHIATRY & NEUROLOGY

## 2018-12-14 PROCEDURE — 85027 COMPLETE CBC AUTOMATED: CPT

## 2018-12-14 PROCEDURE — 63600175 PHARM REV CODE 636 W HCPCS: Performed by: STUDENT IN AN ORGANIZED HEALTH CARE EDUCATION/TRAINING PROGRAM

## 2018-12-14 PROCEDURE — 20000000 HC ICU ROOM

## 2018-12-14 PROCEDURE — 85007 BL SMEAR W/DIFF WBC COUNT: CPT

## 2018-12-14 PROCEDURE — 93010 ELECTROCARDIOGRAM REPORT: CPT | Mod: ,,, | Performed by: INTERNAL MEDICINE

## 2018-12-14 PROCEDURE — 97530 THERAPEUTIC ACTIVITIES: CPT

## 2018-12-14 PROCEDURE — 83735 ASSAY OF MAGNESIUM: CPT

## 2018-12-14 PROCEDURE — 97162 PT EVAL MOD COMPLEX 30 MIN: CPT

## 2018-12-14 RX ORDER — DIAZEPAM 5 MG/1
5 TABLET ORAL EVERY 8 HOURS
Status: COMPLETED | OUTPATIENT
Start: 2018-12-15 | End: 2018-12-16

## 2018-12-14 RX ORDER — QUETIAPINE FUMARATE 25 MG/1
50 TABLET, FILM COATED ORAL EVERY 6 HOURS PRN
Status: DISCONTINUED | OUTPATIENT
Start: 2018-12-14 | End: 2018-12-15

## 2018-12-14 RX ORDER — RAMELTEON 8 MG/1
8 TABLET ORAL NIGHTLY PRN
Status: DISCONTINUED | OUTPATIENT
Start: 2018-12-14 | End: 2018-12-16 | Stop reason: HOSPADM

## 2018-12-14 RX ORDER — DIAZEPAM 5 MG/1
10 TABLET ORAL EVERY 8 HOURS
Status: COMPLETED | OUTPATIENT
Start: 2018-12-14 | End: 2018-12-15

## 2018-12-14 RX ORDER — LORAZEPAM 2 MG/ML
2 INJECTION INTRAMUSCULAR ONCE
Status: DISCONTINUED | OUTPATIENT
Start: 2018-12-14 | End: 2018-12-14

## 2018-12-14 RX ORDER — LORAZEPAM 2 MG/ML
2 INJECTION INTRAMUSCULAR EVERY 4 HOURS PRN
Status: DISCONTINUED | OUTPATIENT
Start: 2018-12-14 | End: 2018-12-16 | Stop reason: HOSPADM

## 2018-12-14 RX ORDER — LORAZEPAM 2 MG/ML
2 INJECTION INTRAMUSCULAR EVERY 4 HOURS PRN
Status: DISCONTINUED | OUTPATIENT
Start: 2018-12-14 | End: 2018-12-14

## 2018-12-14 RX ORDER — OXYCODONE HYDROCHLORIDE 5 MG/1
10 TABLET ORAL ONCE
Status: COMPLETED | OUTPATIENT
Start: 2018-12-14 | End: 2018-12-14

## 2018-12-14 RX ORDER — OXCARBAZEPINE 150 MG/1
150 TABLET, FILM COATED ORAL 2 TIMES DAILY
Status: COMPLETED | OUTPATIENT
Start: 2018-12-14 | End: 2018-12-14

## 2018-12-14 RX ORDER — OXCARBAZEPINE 300 MG/1
300 TABLET, FILM COATED ORAL 2 TIMES DAILY
Status: DISCONTINUED | OUTPATIENT
Start: 2018-12-15 | End: 2018-12-16 | Stop reason: HOSPADM

## 2018-12-14 RX ORDER — LORAZEPAM 1 MG/1
1 TABLET ORAL 2 TIMES DAILY
Status: DISCONTINUED | OUTPATIENT
Start: 2018-12-14 | End: 2018-12-14

## 2018-12-14 RX ORDER — LORAZEPAM 2 MG/ML
1 INJECTION INTRAMUSCULAR ONCE
Status: COMPLETED | OUTPATIENT
Start: 2018-12-14 | End: 2018-12-14

## 2018-12-14 RX ORDER — ZIPRASIDONE HYDROCHLORIDE 40 MG/1
80 CAPSULE ORAL 2 TIMES DAILY WITH MEALS
Status: DISCONTINUED | OUTPATIENT
Start: 2018-12-14 | End: 2018-12-16 | Stop reason: HOSPADM

## 2018-12-14 RX ADMIN — LORAZEPAM 1 MG: 1 TABLET ORAL at 01:12

## 2018-12-14 RX ADMIN — OXCARBAZEPINE 150 MG: 150 TABLET, FILM COATED ORAL at 09:12

## 2018-12-14 RX ADMIN — GABAPENTIN 900 MG: 300 CAPSULE ORAL at 09:12

## 2018-12-14 RX ADMIN — ESCITALOPRAM OXALATE 10 MG: 10 TABLET ORAL at 08:12

## 2018-12-14 RX ADMIN — OXCARBAZEPINE 150 MG: 150 TABLET, FILM COATED ORAL at 10:12

## 2018-12-14 RX ADMIN — ZIPRASIDONE HYDROCHLORIDE 80 MG: 40 CAPSULE ORAL at 05:12

## 2018-12-14 RX ADMIN — DIAZEPAM 10 MG: 5 TABLET ORAL at 09:12

## 2018-12-14 RX ADMIN — ALTEPLASE 2 MG: 2.2 INJECTION, POWDER, LYOPHILIZED, FOR SOLUTION INTRAVENOUS at 12:12

## 2018-12-14 RX ADMIN — GABAPENTIN 900 MG: 300 CAPSULE ORAL at 02:12

## 2018-12-14 RX ADMIN — SODIUM CHLORIDE, SODIUM LACTATE, POTASSIUM CHLORIDE, AND CALCIUM CHLORIDE: .6; .31; .03; .02 INJECTION, SOLUTION INTRAVENOUS at 05:12

## 2018-12-14 RX ADMIN — LORAZEPAM 1 MG: 2 INJECTION INTRAMUSCULAR; INTRAVENOUS at 09:12

## 2018-12-14 RX ADMIN — ZIPRASIDONE HYDROCHLORIDE 80 MG: 20 CAPSULE ORAL at 08:12

## 2018-12-14 RX ADMIN — ENOXAPARIN SODIUM 40 MG: 100 INJECTION SUBCUTANEOUS at 05:12

## 2018-12-14 RX ADMIN — FOLIC ACID 1 MG: 1 TABLET ORAL at 08:12

## 2018-12-14 RX ADMIN — GABAPENTIN 900 MG: 300 CAPSULE ORAL at 08:12

## 2018-12-14 RX ADMIN — Medication 100 MG: at 08:12

## 2018-12-14 RX ADMIN — POTASSIUM CHLORIDE 40 MEQ: 20 SOLUTION ORAL at 08:12

## 2018-12-14 RX ADMIN — BUPROPION HYDROCHLORIDE 450 MG: 150 TABLET, EXTENDED RELEASE ORAL at 08:12

## 2018-12-14 RX ADMIN — RAMELTEON 8 MG: 8 TABLET, FILM COATED ORAL at 09:12

## 2018-12-14 RX ADMIN — DIAZEPAM 10 MG: 5 TABLET ORAL at 05:12

## 2018-12-14 RX ADMIN — OXYCODONE HYDROCHLORIDE 10 MG: 5 TABLET ORAL at 02:12

## 2018-12-14 RX ADMIN — ACETAMINOPHEN 650 MG: 325 TABLET, FILM COATED ORAL at 01:12

## 2018-12-14 RX ADMIN — LORAZEPAM 1 MG: 2 INJECTION INTRAMUSCULAR; INTRAVENOUS at 11:12

## 2018-12-14 RX ADMIN — CEFTRIAXONE 2 G: 2 INJECTION, SOLUTION INTRAVENOUS at 01:12

## 2018-12-14 NOTE — ASSESSMENT & PLAN NOTE
12/14: Pt states he relapsed with opioids prior to admission, unclear source.  Questionable historian.  - queried on 12/14 negative for any recent benzo or opioid prescriptions  -Recommend cessation when appropriate.

## 2018-12-14 NOTE — SUBJECTIVE & OBJECTIVE
"Interval History/Significant Events: Off of precedex all night. Nonspecific complaints this morning of "feeling icky". Mild tremors, diaphoretic, and tachycardia noted. CIWA monitoring with scheduled benzo started.     Review of Systems   Constitutional: Positive for diaphoresis. Negative for fatigue and fever.   HENT: Negative for congestion and trouble swallowing.    Eyes: Negative for photophobia and visual disturbance.   Respiratory: Negative for cough, chest tightness and shortness of breath.    Cardiovascular: Negative for chest pain, palpitations and leg swelling.   Gastrointestinal: Negative for abdominal distention, nausea and vomiting.   Neurological: Positive for tremors.   Psychiatric/Behavioral: Positive for agitation (intermittent). Negative for confusion. The patient is nervous/anxious.      Objective:     Vital Signs (Most Recent):  Temp: 97.7 °F (36.5 °C) (12/14/18 0030)  Pulse: 99 (12/14/18 1500)  Resp: (!) 21 (12/14/18 1500)  BP: (!) 90/56 (12/14/18 1500)  SpO2: 99 % (12/14/18 1500) Vital Signs (24h Range):  Temp:  [97.7 °F (36.5 °C)-98.7 °F (37.1 °C)] 97.7 °F (36.5 °C)  Pulse:  [] 99  Resp:  [14-37] 21  SpO2:  [91 %-100 %] 99 %  BP: ()/(53-88) 90/56   Weight: 74.4 kg (164 lb)  Body mass index is 22.87 kg/m².      Intake/Output Summary (Last 24 hours) at 12/14/2018 1605  Last data filed at 12/14/2018 1100  Gross per 24 hour   Intake 360.3 ml   Output 1621 ml   Net -1260.7 ml       Physical Exam   Constitutional: He is oriented to person, place, and time. He appears well-developed and well-nourished. He appears ill. No distress.   HENT:   Head: Normocephalic and atraumatic.   Eyes: Conjunctivae and EOM are normal. Pupils are equal, round, and reactive to light. No scleral icterus.   Neck: No JVD present. No tracheal deviation present.   Cardiovascular: Regular rhythm and normal heart sounds. Tachycardia present.   Pulses:       Radial pulses are 2+ on the right side, and 2+ on the left " side.        Dorsalis pedis pulses are 2+ on the right side, and 2+ on the left side.   Pulmonary/Chest: Effort normal and breath sounds normal. No respiratory distress. He has no wheezes. He has no rales.   Abdominal: Soft. Bowel sounds are normal. There is no tenderness. There is no guarding.   Neurological: He is alert and oriented to person, place, and time. He displays tremor. No cranial nerve deficit. GCS eye subscore is 3. GCS verbal subscore is 5. GCS motor subscore is 6.   PERRL 7mm beau. AAO X3. Moves all extremities spontaneously, follows all commands. Mild tremors to bilateral hands    Skin: Skin is warm. Capillary refill takes less than 2 seconds. He is diaphoretic.   Psychiatric: His speech is normal and behavior is normal. Thought content normal. His mood appears anxious.   Nursing note and vitals reviewed.      Lines/Drains/Airways     Central Venous Catheter Line                 Percutaneous Central Line Insertion/Assessment - triple lumen  12/08/18 0525 right internal jugular 6 days              Significant Labs:    CBC/Anemia Profile:  Recent Labs   Lab 12/13/18  0435 12/14/18  0638   WBC 8.35 8.63   HGB 10.0* 11.9*   HCT 30.1* 36.9*    396*   MCV 94 95   RDW 12.7 12.9        Chemistries:  Recent Labs   Lab 12/12/18  2114 12/13/18  0435 12/14/18  0638    139 136   K 3.2* 4.1 3.7    106 99   CO2 31* 23 26   BUN 20 16 15   CREATININE 0.7 0.6 0.6   CALCIUM 8.4* 8.3* 9.2   ALBUMIN 2.6* 2.6* 3.2*   PROT  --  5.7* 7.1   BILITOT  --  0.8 0.7   ALKPHOS  --  51* 59   ALT  --  80* 72*   AST  --  50* 30   MG 1.5* 1.9 2.1   PHOS 1.9* 1.9* 2.9       All pertinent labs within the past 24 hours have been reviewed.    Significant Imaging:  I have reviewed all pertinent imaging results/findings within the past 24 hours.

## 2018-12-14 NOTE — ASSESSMENT & PLAN NOTE
- restarted on home antipsychotic regimen, psyc consulted   - hx of etoh abuse, CIWA monitoring   - encephalopathy resolved

## 2018-12-14 NOTE — ASSESSMENT & PLAN NOTE
- Likely 2/2 pneumonia; initial P:F 54  - abx, antiviral, and steroids  - ARDSnet Protocol  - sedated, paralyzed, and proned on 12/9; supinated and paralytic d/c on 12/10  - aggressively diuresised   - respiratory viral panel negative; Oseltamivir discontinued on 12/12.   - extubated to comfort flow on 12/12.  High dose steroids discontinued.   - blood and respiratory cultures no growth to date  - urine legionella negative   - resolved; On Room air

## 2018-12-14 NOTE — PLAN OF CARE
Problem: Adult Inpatient Plan of Care  Goal: Plan of Care Review  Outcome: Ongoing (interventions implemented as appropriate)  Pt remains intermittently agitated. Precedex d/c'd and restarted d/t agitation and repeated pulling at lines. CVP down to 3, FR with 1L NS. Comfort flow weaned as tolerated (15L 40%). POC for continued weaning of oxygen requirements and supportive measures for agitation and anxiety. POC reviewed with pt and family. All questions and concerns were addressed. Pt and family verbalized understanding.

## 2018-12-14 NOTE — PLAN OF CARE
Problem: Physical Therapy Goal  Goal: Physical Therapy Goal  Goals to be met by: 12/23/2018    Patient will increase functional independence with mobility by performing:    Supine <> sit with [place].  Sit <> stand transfer with [place] using No Assistive Device.  Bed <> chair transfer via Stand Pivot with [place] using No Assistive Device.  Gait  x 300 feet with Modified [place] using No Assistive Device to prepare for community ambulation and endurance activities.      Outcome: Ongoing (interventions implemented as appropriate)    PT eval completed.  Appropriate transfer level with nursing staff: Bed <> Chair:  Stand Pivot with Stand-by Assistance with 1 person.    Lisa López PT, DPT  12/14/2018  Pager: [phone]

## 2018-12-14 NOTE — PROGRESS NOTES
[facility]  Critical Care Medicine  Progress Note    Patient Name: Ashwin Madrid  MRN: [id number]  Admission Date: 12/9/2018  Hospital Length of Stay: 5 days  Code Status: Full Code  Attending Provider: Jv Nicole*  Primary Care Provider: Primary Doctor No   Principal Problem: ARDS (adult respiratory distress syndrome)    Subjective:     HPI:  Mr Madrid is a 39 yo M w PMHx significant for COPD (nil PFT), bipolar disorder, ADHD, polysubstance abuse and GERD who was transferred to [facility] from [facility] d/t concern for ARDS requiring proning.     As patient was intubated on arrival, the majority of the history was collected from transfer paperwork and chart review. Pt presented to OSH with 3-day history of progressively worsening cough, fever and shortness of breath with associated decrease in PO intake and urine output. On admission, he denied IVDU and stated that he had abstained from his drug of choice (methamphetamine). On admission to OSH, he was noted to have leukocytosis with WBC of 21.4, fever of 102.7F, tachycardia of 127, tachypnea of 41 and hypoxemia with SpO2 of 84%. Imaging studies revealed RLL infiltrate concerning for community-acquired pneumonia.     Over the course of his 24 hours at OSH, his supplementary oxygen requirements steadily increased from nasal cannula to nonrebreather to BiPAP to ultimately intubation. Repeat CXR revealed development of bilateral infiltrates concerning for ARDS; [id number] ratio was 54 and he was paralyzed with intermittent rocuronium.     Labs at OSH also revealed a transaminitis with AST of 186 and ALT of 48.     Hospital/ICU Course:  Transferred to MICU on 12/9 intubated, paralyzed, and sedated. Prone positioning for P:F 54. Supinated on 12/10 at 0930, P:F 200. Paralytic discontinued with severe agitation on fentanyl, versed, precedex, and dilaudid pushes. Low dose propofol used for a short time, then changed to ketamine.  "Vent weaned according to ARDSnet protocol. Bipolar meds restarted on 12/11. Diuresed aggressively with electrolyte replacement. Versed discontinued on 12/11. Extubated to comfort flow on 12/12.  Ketamine and Fentanyl infusions discontinued. Haldol ordered prn.  Updated home medication list with most recent medication regimen provided by patient's family. Intermittent agitation requiring precedex infusion and sitter. Psyc consulted for management of bipolar medications. 12/14 concern for symptoms of alcohol withdrawal; PO ativan BID started.         Interval History/Significant Events: Off of precedex all night. Nonspecific complaints this morning of "feeling icky". Mild tremors, diaphoretic, and tachycardia noted. CIWA monitoring with scheduled benzo started.     Review of Systems   Constitutional: Positive for diaphoresis. Negative for fatigue and fever.   HENT: Negative for congestion and trouble swallowing.    Eyes: Negative for photophobia and visual disturbance.   Respiratory: Negative for cough, chest tightness and shortness of breath.    Cardiovascular: Negative for chest pain, palpitations and leg swelling.   Gastrointestinal: Negative for abdominal distention, nausea and vomiting.   Neurological: Positive for tremors.   Psychiatric/Behavioral: Positive for agitation (intermittent). Negative for confusion. The patient is nervous/anxious.      Objective:     Vital Signs (Most Recent):  Temp: 97.7 °F (36.5 °C) (12/14/18 0030)  Pulse: 99 (12/14/18 1500)  Resp: (!) 21 (12/14/18 1500)  BP: (!) 90/56 (12/14/18 1500)  SpO2: 99 % (12/14/18 1500) Vital Signs (24h Range):  Temp:  [97.7 °F (36.5 °C)-98.7 °F (37.1 °C)] 97.7 °F (36.5 °C)  Pulse:  [] 99  Resp:  [14-37] 21  SpO2:  [91 %-100 %] 99 %  BP: ()/(53-88) 90/56   Weight: 74.4 kg (164 lb)  Body mass index is 22.87 kg/m².      Intake/Output Summary (Last 24 hours) at 12/14/2018 1605  Last data filed at 12/14/2018 1100  Gross per 24 hour   Intake 360.3 ml "   Output 1621 ml   Net -1260.7 ml       Physical Exam   Constitutional: He is oriented to person, place, and time. He appears well-developed and well-nourished. He appears ill. No distress.   HENT:   Head: Normocephalic and atraumatic.   Eyes: Conjunctivae and EOM are normal. Pupils are equal, round, and reactive to light. No scleral icterus.   Neck: No JVD present. No tracheal deviation present.   Cardiovascular: Regular rhythm and normal heart sounds. Tachycardia present.   Pulses:       Radial pulses are 2+ on the right side, and 2+ on the left side.        Dorsalis pedis pulses are 2+ on the right side, and 2+ on the left side.   Pulmonary/Chest: Effort normal and breath sounds normal. No respiratory distress. He has no wheezes. He has no rales.   Abdominal: Soft. Bowel sounds are normal. There is no tenderness. There is no guarding.   Neurological: He is alert and oriented to person, place, and time. He displays tremor. No cranial nerve deficit. GCS eye subscore is 3. GCS verbal subscore is 5. GCS motor subscore is 6.   PERRL 7mm beau. AAO X3. Moves all extremities spontaneously, follows all commands. Mild tremors to bilateral hands    Skin: Skin is warm. Capillary refill takes less than 2 seconds. He is diaphoretic.   Psychiatric: His speech is normal and behavior is normal. Thought content normal. His mood appears anxious.   Nursing note and vitals reviewed.      Lines/Drains/Airways     Central Venous Catheter Line                 Percutaneous Central Line Insertion/Assessment - triple lumen  12/08/18 0525 right internal jugular 6 days              Significant Labs:    CBC/Anemia Profile:  Recent Labs   Lab 12/13/18  0435 12/14/18  0638   WBC 8.35 8.63   HGB 10.0* 11.9*   HCT 30.1* 36.9*    396*   MCV 94 95   RDW 12.7 12.9        Chemistries:  Recent Labs   Lab 12/12/18  2114 12/13/18  0435 12/14/18  0638    139 136   K 3.2* 4.1 3.7    106 99   CO2 31* 23 26   BUN 20 16 15   CREATININE 0.7  0.6 0.6   CALCIUM 8.4* 8.3* 9.2   ALBUMIN 2.6* 2.6* 3.2*   PROT  --  5.7* 7.1   BILITOT  --  0.8 0.7   ALKPHOS  --  51* 59   ALT  --  80* 72*   AST  --  50* 30   MG 1.5* 1.9 2.1   PHOS 1.9* 1.9* 2.9       All pertinent labs within the past 24 hours have been reviewed.    Significant Imaging:  I have reviewed all pertinent imaging results/findings within the past 24 hours.      ABG  Recent Labs   Lab 12/12/18  0807   PH 7.492*   PO2 70*   PCO2 41.8   HCO3 32.0*   BE 9     Assessment/Plan:     Neuro   Acute metabolic encephalopathy    - restarted on home antipsychotic regimen, psyc consulted   - hx of etoh abuse, CIWA monitoring   - encephalopathy resolved      Psychiatric   Alcohol abuse    - patient reports drinking 1/2 of pint to 1 pint a day of vodka,   - reports that he has had withdrawal seizures in the past   - CIWA monitoring   - weaned off precedex on 12/13  - Ativan scheduled and PRN   - Thiamine and folate replacement     Opioid abuse    - sister reports long term rehab for heroin abuse  - patient reports taking Roxicodone 25mg TID (not prescribed)  - high dose fentanyl infusion when intubated  - monitor for withdrawal             Bipolar disorder    - QTc 453;  daily EKGs to evaluate QTc  - restarted on home medications  - psyc consulted for med management, appreciate recs         Pulmonary   * ARDS (adult respiratory distress syndrome)    - Likely 2/2 pneumonia; initial P:F 54  - abx, antiviral, and steroids  - ARDSnet Protocol  - sedated, paralyzed, and proned on 12/9; supinated and paralytic d/c on 12/10  - aggressively diuresised   - respiratory viral panel negative; Oseltamivir discontinued on 12/12.   - extubated to comfort flow on 12/12.  High dose steroids discontinued.   - blood and respiratory cultures no growth to date  - urine legionella negative   - resolved; On Room air     Acute hypoxemic respiratory failure    --see ARDS     Pneumonia    - viral vs bacterial   - viral panel negative   -  covered with vanc, zosyn, and azithro.  - no growth from cultures  - deescalate to rocephin only, last dose 12/15  - tamiflu d/c        Cardiac/Vascular   Hypertriglyceridemia    - Lipase WNL  - downtrending with discontinuation of propofol.   - continue to trend               Immunology/Multi System   Hepatitis C antibody positive in blood    - RNA negative      GI   Transaminitis    - initial AST of 159 and ALT of 59  - EtOH <10  - liver enzymes improving.                      Critical Care Time: 40 minutes  Critical secondary to Patient has a condition that poses threat to life and bodily function: ETOH/ benzo withdrawal       Critical care was time spent personally by me on the following activities: development of treatment plan with patient or surrogate and bedside caregivers, discussions with consultants, evaluation of patient's response to treatment, examination of patient, ordering and performing treatments and interventions, ordering and review of laboratory studies, ordering and review of radiographic studies, pulse oximetry, re-evaluation of patient's condition. This critical care time did not overlap with that of any other provider or involve time for any procedures.     Amanda Piper NP  Critical Care Medicine  [facility]

## 2018-12-14 NOTE — ASSESSMENT & PLAN NOTE
- patient reports drinking 1/2 of pint to 1 pint a day of vodka,   - reports that he has had withdrawal seizures in the past   - CIWA monitoring   - weaned off precedex on 12/13  - Ativan scheduled and PRN   - Thiamine and folate replacement

## 2018-12-14 NOTE — ASSESSMENT & PLAN NOTE
- QTc 453;  daily EKGs to evaluate QTc  - restarted on home medications  - psyc consulted for med management, appreciate recs

## 2018-12-14 NOTE — HOSPITAL COURSE
12/14/2018  Pt refuses a full interview.    12/15/2018  Pt is up walking around the room and agreeable to speak. He is linear and oriented. Pt endorses anxiety and requests Ativan. He is educated on Benzos not being optimal treatment selections for chronic anxiety. When asked about his Seroquel, pt reports he can't take Seroquel because it gives him nightmares. Pt is encouraged to utilize hydroxyzine if he becomes anxious. He denies SI/HI/AVH.    12/16/2018  Patient is calm, cooperative and agreeable to speak. He is alert and oriented with linear TP. He states he is doing much better and has no complaints. Denies SI/HI/AVH.

## 2018-12-14 NOTE — NURSING
MD rounds. Made aware of inabilty to flush/obtain blood return from port on TLC. Orders received to admin cathflo.

## 2018-12-14 NOTE — PT/OT/SLP EVAL
"Physical Therapy Evaluation    Patient Name:  Ashwin Madrid   MRN:  [id number]  Admitting Diagnosis:  ARDS (adult respiratory distress syndrome)   Recent Surgery: * No surgery found *      Recommendations:     Discharge Recommendations:  (home, no needs)   Discharge Equipment Recommendations: none   Barriers to discharge: Decreased caregiver support    Plan:     During this hospitalization, patient to be seen 2 x/week to address the above listed problems via gait training, therapeutic activities, therapeutic exercises, neuromuscular re-education  · Plan of Care Expires:  01/13/19   Plan of Care Reviewed with: patient    This Plan of care has been discussed with the patient who was involved in its development and understands and is in agreement with the identified goals and treatment plan    History:     Patient lives alone in [place], [place] in a single family home with no ANABEL.  Patient reports being independent with mobility & with ADLs.  Patient uses DME as follows: none.    DME owned (not currently used): none.  Hand Dominance: right    Roles/Repsonsibilities:   Work: no.    Drive: no.    Managing Medicines/Managing Home: yes.    Hobbies: none stated.  Upon discharge, patient will have assistance from unknown.    Past Medical History:   Diagnosis Date    ADHD (attention deficit hyperactivity disorder) 2/7/2012    Anxiety     Bipolar 1 disorder 2/7/2012    Depression     GERD (gastroesophageal reflux disease)        Past Surgical History:   Procedure Laterality Date    EGD (ESOPHAGOGASTRODUODENOSCOPY) N/A 9/14/2012    Performed by Juancarlos So Jr., [facility] at [facility]    ESOPHAGOGASTRODUODENOSCOPY         Subjective     Communicated with RN prior to session.  Patient found supine upon PT entry to room, agreeable to evaluation.    "I'm hurting all over, I just wanted to walk some."  Chief Complaint: generalized pain  Patient comments/goals: to return home  Pain/Comfort:  · Pain Rating 1: (pt c/o generalized " pain, RN present provided pain meds)    Objective:     Patient found with: blood pressure cuff, pulse ox (continuous), telemetry, central line     General Precautions: Standard, Cardiac fall   Orthopedic Precautions:N/A   Braces: N/A   Respiratory Status: room air  Vital Signs (Most Recent):    Temp: 97.7 °F (36.5 °C) (12/14/18 0030)  Pulse: (!) 136 (12/14/18 1600)  Resp: (!) 22 (12/14/18 1600)  BP: (!) 91/53 (12/14/18 1600)  SpO2: (!) 92 % (12/14/18 1600)    Exam:  · Mental Status: Patient is AxOx4 and follows all multi-step verbal commands. Pt is Alert and Cooperative during session.  · Skin Integrity: Visible skin intact  · Edema: none noted  · Sensation: Intact  · Hearing: Intact  · Vision:  Intact  · Range of Motion:  · RUE: WFL  · LUE: WFL  · RLE: WFL  · LLE: WFL  · Strength Exam:  · Upper Extremity Strength: grossly WFL  · Lower Extremity Strength: grossly WFL    Functional Mobility:  Bed Mobility:   · Rolling/Turning to Left: independence  · Rolling/Turning to Right: independence  · Scooting to HOB via supine bridge: independence  · Scooting to EOB to have both feet planted on floor: independence  · Supine to Sit: modified independence; from right side of bed  · Sit to Supine: modified independence; to right side of bed    Increased time    Transfers:   · Sit <> Stand Transfer: stand by assistance with no assistive device   · Stand <> Sit Transfer: stand by assistance with no assistive device   · x2 from EOB, x1 from bedside chair      Gait:  · Patient ambulated: 50ft within room   · Patient required: contact guard  · Patient used:  No Assistive Device  · Gait Pattern observed: reciprocal gait  · Gait Deviation(s): decreased asad  · Impairments due to: decreased balance and pain  · Comments: pt with 3-4 LOB, required min A to recover.    Therapeutic Activities & Exercises:   During first attempt at evaluation, pt only minimally participatory due to not feeling well. Upon second attempt, pt alert and  interactive wanting to walk in room.     Education:  Patient provided with daily orientation and goals of this PT session. Patient agreed to participate in session. Patient aware of patient's deficits and therapy progression. They were educated to transfer to bedside chair/bedside commode/bathroom with RN/PCT present. Encouraged patient to perform daily exercises & mobility to increase endurance and decrease effects of bedrest. Time provided for therapeutic counseling and discussion of health disposition. All questions answered to patient's satisfaction, within scope of PT practice; voiced no other concerns. White board updated in patient's room, RN notified of session.    Patient left supine, with head in midline, neutral pelvis & heels floated for skin protection with all lines intact, call button in reach and RN notified.    AM-PAC 6 CLICK MOBILITY  Total Score:18     Assessment:     Ashwin Madrid is a 38 y.o. male admitted with a medical diagnosis of ARDS (adult respiratory distress syndrome).  He presents with the following impairments/functional limitations: decreased functional mobility. Pt with decreased balance with mobility which increased his fall risk. Plan to test balance further and increased distance ambulated. Ashwin Shipmans deficits effect their ability to safely and independently participate in self-care tasks and functional mobility which increases their caregiver burden. Due to his physical therapy diagnosis of debility and deconditioning, they continue to benefit from acute PT services to address the following limitations: high fall risk, weakness, instability, and the need for supervised instruction of exercise. Ashwin Shipmans deficits effect their roles and responsibilities in which they were able to complete prior to admit. Education was provided to patient regarding importance of continued participation in therapy, patient's progress and discharge disposition. Pt would  benefit from an intensive and collaborative PT/OT/SLP therapy program to improve quality of life and focus on recovery of impairments.     Problem List: gait instability, impaired balance  Rehab Prognosis: good; patient would benefit from acute skilled PT services to address these deficits and reach maximum level of function.      GOALS:   Multidisciplinary Problems     Physical Therapy Goals        Problem: Physical Therapy Goal    Goal Priority Disciplines Outcome Goal Variances Interventions   Physical Therapy Goal     PT, PT/OT Ongoing (interventions implemented as appropriate)     Description:  Goals to be met by: 12/23/2018    Patient will increase functional independence with mobility by performing:    Supine <> sit with [place].  Sit <> stand transfer with [place] using No Assistive Device.  Bed <> chair transfer via Stand Pivot with [place] using No Assistive Device.  Gait  x 300 feet with Modified [place] using No Assistive Device to prepare for community ambulation and endurance activities.                         Clinical Decision Making:   On examination of body system using standardized tests and measures patient presents with 3 or more elements from any of the following: body structures and functions, activity limitations, and/or participation restrictions.  Leading to a clinical presentation that is considered evolving with changing characteristics  Evaluation Complexity:  Moderate - 88079      Time Tracking:     PT Received On: 12/14/18  PT Start Time: 0904     PT Stop Time: 0911   PT returned from [id number], added 11 minutes to total time  PT Total Time (min): 18 min     Billable Minutes: Evaluation 8 and Therapeutic Activity 10    Lisa López PT, DPT  12/14/2018  Pager:[phone]

## 2018-12-14 NOTE — SUBJECTIVE & OBJECTIVE
Interval History:   Pt refuses a full interview.  Tachycardic, diaphoretic, given lorazepam by primary for concern of alcohol withdrawal.    Pt reports that he drinks 1/2 pint per day at home, takes lorazepam BID at home ( negative for benzodiazepines), and that he relapsed on opioid pills prior to admission (unwilling to offer further information).  UDS at [facility] on 12/7 negative.    Denies SIHI, AVH, paresthesias, tremors, headache, n/v.  Endorses anxiety.      Family History     None        Tobacco Use    Smoking status: Current Every Day Smoker     Packs/day: 1.00     Years: 10.00     Pack years: 10.00     Types: Cigarettes    Smokeless tobacco: Never Used   Substance and Sexual Activity    Alcohol use: No    Drug use: No     Comment: history of MJ at age 17, pain pills, mushrooms, Sober for last 1.5 years    Sexual activity: Yes     Partners: Female     Psychotherapeutics (From admission, onward)    Start     Stop Route Frequency Ordered    12/14/18 1715  ziprasidone capsule 80 mg      -- Oral 2 times daily with meals 12/14/18 1304    12/14/18 0958  lorazepam injection 2 mg      -- IV Every 4 hours PRN 12/14/18 0858    12/14/18 0900  LORazepam tablet 1 mg      -- Oral 2 times daily 12/14/18 0858    12/13/18 1745  haloperidol lactate injection 2 mg      -- IV Every 8 hours PRN 12/13/18 1641    12/13/18 0900  buPROPion TB24 tablet 450 mg      -- Oral Daily 12/12/18 1643    12/13/18 0900  escitalopram oxalate tablet 10 mg      -- Oral Daily 12/12/18 1643    12/12/18 1642  amitriptyline tablet 100 mg      -- Oral Nightly PRN 12/12/18 1643           Review of Systems   Constitutional: Positive for diaphoresis.   Respiratory: Negative for shortness of breath.    Cardiovascular: Negative for chest pain.   Gastrointestinal: Negative for abdominal pain.   Neurological: Negative for weakness and headaches.   Psychiatric/Behavioral: Positive for confusion. Negative for hallucinations and suicidal ideas. The patient  "is not nervous/anxious.      Objective:     Vital Signs (Most Recent):  Temp: 97.7 °F (36.5 °C) (12/14/18 0030)  Pulse: 108 (12/14/18 1300)  Resp: (!) 24 (12/14/18 1300)  BP: 101/62 (12/14/18 1300)  SpO2: 95 % (12/14/18 1300) Vital Signs (24h Range):  Temp:  [97.7 °F (36.5 °C)-98.7 °F (37.1 °C)] 97.7 °F (36.5 °C)  Pulse:  [] 108  Resp:  [14-37] 24  SpO2:  [94 %-100 %] 95 %  BP: ()/(53-88) 101/62     Height: 5' 11" (180.3 cm)  Weight: 74.4 kg (164 lb)  Body mass index is 22.87 kg/m².      Intake/Output Summary (Last 24 hours) at 12/14/2018 1519  Last data filed at 12/14/2018 1100  Gross per 24 hour   Intake 364 ml   Output 1721 ml   Net -1357 ml       Physical Exam   Constitutional: He appears well-developed. No distress.   Cardiovascular: Regular rhythm.   No murmur heard.  tachycardic   Pulmonary/Chest: Effort normal and breath sounds normal. No stridor. No respiratory distress.   Abdominal: Soft. Bowel sounds are normal. He exhibits no distension. There is no tenderness.   Neurological: He is alert.   Psychiatric:   Mental Status Exam:    Abbreviated, pt refuses full interview.    Appearance: older than stated age  Level of Consciousness: Alert  Behavior/Cooperation: uncooperative, psychomotor retardation  Psychomotor: psychomotor retardation   Speech: aphonic, slowed  Language: english, fluid  Orientation: month/year, not date.  Attention Span/Concentration: pt declines exam, poor.  Memory: pt declines exam  Mood: "icky"  Affect: blunted and flat  Thought Process: tangential   Associations: poverty of thought  Thought Content: poverty of content  Fund of Knowledge: impaired, confusion regarding his admission   Abstraction: pt declines exam  Insight: poor  Judgment: poor    CAM-ICU positive.         Significant Labs:   Recent Results (from the past 24 hour(s))   CBC auto differential    Collection Time: 12/14/18  6:38 AM   Result Value Ref Range    WBC 8.63 3.90 - 12.70 K/uL    RBC 3.90 (L) 4.60 - 6.20 " M/uL    Hemoglobin 11.9 (L) 14.0 - 18.0 g/dL    Hematocrit 36.9 (L) 40.0 - 54.0 %    MCV 95 82 - 98 fL    MCH 30.5 27.0 - 31.0 pg    MCHC 32.2 32.0 - 36.0 g/dL    RDW 12.9 11.5 - 14.5 %    Platelets 396 (H) 150 - 350 K/uL    MPV 9.2 9.2 - 12.9 fL    Immature Granulocytes Test Not Performed 0.0 - 0.5 %    Immature Grans (Abs) Test Not Performed 0.00 - 0.04 K/uL    nRBC 0 0 /100 WBC    Gran% 64.0 38.0 - 73.0 %    Lymph% 19.0 18.0 - 48.0 %    Mono% 4.0 4.0 - 15.0 %    Eosinophil% 11.0 (H) 0.0 - 8.0 %    Basophil% 0.0 0.0 - 1.9 %    Myelocytes 2.0 %    Platelet Estimate Increased (A)     Differential Method Manual    Comprehensive metabolic panel    Collection Time: 18  6:38 AM   Result Value Ref Range    Sodium 136 136 - 145 mmol/L    Potassium 3.7 3.5 - 5.1 mmol/L    Chloride 99 95 - 110 mmol/L    CO2 26 23 - 29 mmol/L    Glucose 86 70 - 110 mg/dL    BUN, Bld 15 6 - 20 mg/dL    Creatinine 0.6 0.5 - 1.4 mg/dL    Calcium 9.2 8.7 - 10.5 mg/dL    Total Protein 7.1 6.0 - 8.4 g/dL    Albumin 3.2 (L) 3.5 - 5.2 g/dL    Total Bilirubin 0.7 0.1 - 1.0 mg/dL    Alkaline Phosphatase 59 55 - 135 U/L    AST 30 10 - 40 U/L    ALT 72 (H) 10 - 44 U/L    Anion Gap 11 8 - 16 mmol/L    eGFR if African American >60.0 >60 mL/min/1.73 m^2    eGFR if non African American >60.0 >60 mL/min/1.73 m^2   Magnesium    Collection Time: 18  6:38 AM   Result Value Ref Range    Magnesium 2.1 1.6 - 2.6 mg/dL   Phosphorus    Collection Time: 18  6:38 AM   Result Value Ref Range    Phosphorus 2.9 2.7 - 4.5 mg/dL         Significant Imagin18 EKG: Per independent resident review and interpretation,  Sinus tachycardia ().  QTc 453.

## 2018-12-14 NOTE — ASSESSMENT & PLAN NOTE
1/2 pint per day  - queried on 12/14 negative for any recent benzo or opioid prescriptions  -Recommend cessation when appropriate

## 2018-12-14 NOTE — ASSESSMENT & PLAN NOTE
Ashwin Madrid is a 38 y.o. male with a past psychiatric history of bipolar disorder (unspecified), ADHD, polysubstance abuse (prescription opioids, heroin), currently presenting as a transfer from [facility] on 12/9 for management of ARDS.  Psychiatry was consulted to address the patient's symptoms of management of bipolar medications.     Given complicated hospital course, waxing/waning symptoms, suspect a component of delirium in the setting of underlying bipolar disorder.     Not clear that current diaphoresis/anxiety is related to alcohol or benzodiazepine withdrawal given time since last drink and lack of improvement in objective findings (such as HR, BP) with doses of lorazepam on 12/14 AM.    Home medications on sheet from recent discharge from [place], and endorsed to be accurate per pt's sister:  Trileptal 300 BID  Melatonin 3 mg QHS  Elavil 100 mg QHS PRN  Gabapentin 900 TID   Lexapro 10 mg Qdaily  Topamax 25 mg BID  Wellbutrin  mg Qdaily  Geodon 80 at 1250 and 2000 daily    Recommendations  -Increase trileptal to 300 mg BID starting on 12/15 AM  -Ativan 2 mg Q4hrs PRN for at least two of the following: SBP>160, DBP>100, HR>110  -Seroquel 50 mg Q6hrs PRN for severe agitation  -Discontinue elavil PRN, haldol PRN.  -Ramelteon PRN  -EKG Qdaily  -Continue wellbutrin  Qdaily, lexapro 10 mg Qdaily, gabapentin 900 TID, and ziprasidone 80 mg BID.  -Ensure that geodon is given with food for proper absorption  - on opioid, alcohol cessation when appropriate.      Delirium Precautions  -Re-orient patient frequently and keep pt's whiteboard up to date with current day and team member's names  -Keep shades open/room lit during day  -Low light at night (close blinds at night)  -Low stimulation, minimize interruptions at night  -Minimize use of restraints  -Encourage family to be at bedside  -Avoid opiates, benzodiazepines, antihistamines, anticholinergics, hypnotics as much as  possible

## 2018-12-14 NOTE — PROGRESS NOTES
"[facility]  Psychiatry  Progress Note    Patient Name: Ashwin Madrid  MRN: [id number]   Code Status: Full Code  Admission Date: 12/9/2018  Hospital Length of Stay: 5 days  Expected Discharge Date:   Attending Physician: Jv Nicole*  Primary Care Provider: Primary Doctor No    Current Legal Status: N/A    Patient information was obtained from patient and past medical records.     Subjective:     Principal Problem:ARDS (adult respiratory distress syndrome)    Chief Complaint: Delirium, bipolar disorder    HPI:   Per Primary MD:  "Mr Madrid is a 39 yo M w PMHx significant for COPD (nil PFT), bipolar disorder, ADHD, polysubstance abuse and GERD who was transferred to [facility] from [facility] d/t concern for ARDS requiring proning.      As pt was intubated on arrival, the majority of the history was collected from transfer paperwork and chart review. Pt presented to OSH with 3-day history of progressively worsening cough, fever and shortness of breath with associated decrease in PO intake and urine output. On admission, he denied IVDU and stated that he had abstained from his drug of choice (methamphetamine). On admission to OSH, he was noted to have leukocytosis with WBC of 21.4, fever of 102.7F, tachycardia of 127, tachypnea of 41 and hypoxemia with SpO2 of 84%. Imaging studies revealed RLL infiltrate concerning for community-acquired pneumonia.      Over the course of his 24 hours at OSH, his supplementary oxygen requirements steadily increased from nasal cannula to nonrebreather to BiPAP to ultimately intubation. Repeat CXR revealed development of bilateral infiltrates concerning for ARDS; [id number] ratio was 54 and he was paralyzed with intermittent rocuronium.      Labs at OSH also revealed a transaminitis with AST of 186 and ALT of 48."     Per Psychiatry MD:   Ashwin Madrid is a 38 y.o. male with a past psychiatric history of bipolar disorder (unspecified), ADHD, " polysubstance abuse (prescription opioids, heroin), currently presenting as a transfer from [facility] on 12/9 for management of ARDS.  Psychiatry was consulted to address the patient's symptoms of management of bipolar medications.     Since arrival, pt was extubated on 12/12/18.  RN reports pressured speech at that time, intermittent agitation.  On initial resident interview, pt answered questions for only a few minutes before declining any further questions.  I spoke with pt's sister, Amanda ([phone]) for most of the history.  She reports that pt was hospitalized at [facility] from 4/20 - 11/2018 for opioid/heroin abuse.  He was discharged in 11/2018 with plans to f/u locally at [place].  Sister states he was seen there at least once since discharge, but no medication changes, and she does not recall the name of a psychiatrist/psychologist there.       Collateral:   Amanda (sister) [phone]    SUBJECTIVE   Currently, the patient is endorsing the following:    Medical Review Of Systems:  Limited 2/2 patient participation  Constitutional: negative for fevers  Respiratory: positive for increased work of breathing  Behavioral/Psych: positive for agitation, confusion    Psychiatric Review Of Systems - Is patient experiencing or having changes in:  Limited 2/2 pt participation  sleep: yes  Pressured speech:  yes    Past Medical/Surgical History:  Past Medical History:   Diagnosis Date    ADHD (attention deficit hyperactivity disorder) 2/7/2012    Anxiety     Bipolar 1 disorder 2/7/2012    Depression     GERD (gastroesophageal reflux disease)       has a past medical history of ADHD (attention deficit hyperactivity disorder) (2/7/2012), Anxiety, Bipolar 1 disorder (2/7/2012), Depression, and GERD (gastroesophageal reflux disease).  Past Surgical History:   Procedure Laterality Date    EGD (ESOPHAGOGASTRODUODENOSCOPY) N/A 9/14/2012    Performed by Juancarlos So Jr., MD at [facility]     ESOPHAGOGASTRODUODENOSCOPY         Past Psychiatric History:  Previous Medication Trials: Yes - uncertain which medications  Previous Psychiatric Hospitalizations: Yes - [facility] April - November 2018  Previous Suicide Attempts: No  History of Violence: No  Outpatient Psychiatrist: Yes - In [place], uncertain name  Outpatient Psychotherapist: Yes - In [place], uncertain name    Social History:  Marital Status: not   Children: 0   Employment Status/Info: unemployed  Education: 10th grade  Special Ed: no  Housing/Lives with: mom, sister (Amanda)  History of phys/sexual abuse: No  Access to gun: No    Substance Abuse History:  Recreational Drugs: History of heroin, prescription opioids  Use of Alcohol: denied  Tobacco Use:yes 2 ppd    Functioning Relationships: good support system    Psychosocial Factors:  Peer group, social, ethic, cultural, emotional, and health factors: lives with family  Living situation, family constellation, family circumstances/home: lives with family, sister provides significant support    Hospital Course: 12/14/2018  Pt refuses a full interview.    Interval History:   Pt refuses a full interview.  Tachycardic, diaphoretic, given lorazepam by primary for concern of alcohol withdrawal.    Pt reports that he drinks 1/2 pint per day at home, takes lorazepam BID at home ( negative for benzodiazepines), and that he relapsed on opioid pills prior to admission (unwilling to offer further information).  UDS at [facility] on 12/7 negative.    Denies SIHI, AVH, paresthesias, tremors, headache, n/v.  Endorses anxiety.      Family History     None        Tobacco Use    Smoking status: Current Every Day Smoker     Packs/day: 1.00     Years: 10.00     Pack years: 10.00     Types: Cigarettes    Smokeless tobacco: Never Used   Substance and Sexual Activity    Alcohol use: No    Drug use: No     Comment: history of MJ at age 17, pain pills, mushrooms, Sober for last 1.5 years    Sexual  "activity: Yes     Partners: Female     Psychotherapeutics (From admission, onward)    Start     Stop Route Frequency Ordered    12/14/18 1715  ziprasidone capsule 80 mg      -- Oral 2 times daily with meals 12/14/18 1304    12/14/18 0958  lorazepam injection 2 mg      -- IV Every 4 hours PRN 12/14/18 0858    12/14/18 0900  LORazepam tablet 1 mg      -- Oral 2 times daily 12/14/18 0858    12/13/18 1745  haloperidol lactate injection 2 mg      -- IV Every 8 hours PRN 12/13/18 1641    12/13/18 0900  buPROPion TB24 tablet 450 mg      -- Oral Daily 12/12/18 1643    12/13/18 0900  escitalopram oxalate tablet 10 mg      -- Oral Daily 12/12/18 1643    12/12/18 1642  amitriptyline tablet 100 mg      -- Oral Nightly PRN 12/12/18 1643           Review of Systems   Constitutional: Positive for diaphoresis.   Respiratory: Negative for shortness of breath.    Cardiovascular: Negative for chest pain.   Gastrointestinal: Negative for abdominal pain.   Neurological: Negative for weakness and headaches.   Psychiatric/Behavioral: Positive for confusion. Negative for hallucinations and suicidal ideas. The patient is not nervous/anxious.      Objective:     Vital Signs (Most Recent):  Temp: 97.7 °F (36.5 °C) (12/14/18 0030)  Pulse: 108 (12/14/18 1300)  Resp: (!) 24 (12/14/18 1300)  BP: 101/62 (12/14/18 1300)  SpO2: 95 % (12/14/18 1300) Vital Signs (24h Range):  Temp:  [97.7 °F (36.5 °C)-98.7 °F (37.1 °C)] 97.7 °F (36.5 °C)  Pulse:  [] 108  Resp:  [14-37] 24  SpO2:  [94 %-100 %] 95 %  BP: ()/(53-88) 101/62     Height: 5' 11" (180.3 cm)  Weight: 74.4 kg (164 lb)  Body mass index is 22.87 kg/m².      Intake/Output Summary (Last 24 hours) at 12/14/2018 1519  Last data filed at 12/14/2018 1100  Gross per 24 hour   Intake 364 ml   Output 1721 ml   Net -1357 ml       Physical Exam   Constitutional: He appears well-developed. No distress.   Cardiovascular: Regular rhythm.   No murmur heard.  tachycardic   Pulmonary/Chest: Effort " "normal and breath sounds normal. No stridor. No respiratory distress.   Abdominal: Soft. Bowel sounds are normal. He exhibits no distension. There is no tenderness.   Neurological: He is alert.   Psychiatric:   Mental Status Exam:    Abbreviated, pt refuses full interview.    Appearance: older than stated age  Level of Consciousness: Alert  Behavior/Cooperation: uncooperative, psychomotor retardation  Psychomotor: psychomotor retardation   Speech: aphonic, slowed  Language: english, fluid  Orientation: month/year, not date.  Attention Span/Concentration: pt declines exam, poor.  Memory: pt declines exam  Mood: "icky"  Affect: blunted and flat  Thought Process: tangential   Associations: poverty of thought  Thought Content: poverty of content  Fund of Knowledge: impaired, confusion regarding his admission   Abstraction: pt declines exam  Insight: poor  Judgment: poor    CAM-ICU positive.         Significant Labs:   Recent Results (from the past 24 hour(s))   CBC auto differential    Collection Time: 12/14/18  6:38 AM   Result Value Ref Range    WBC 8.63 3.90 - 12.70 K/uL    RBC 3.90 (L) 4.60 - 6.20 M/uL    Hemoglobin 11.9 (L) 14.0 - 18.0 g/dL    Hematocrit 36.9 (L) 40.0 - 54.0 %    MCV 95 82 - 98 fL    MCH 30.5 27.0 - 31.0 pg    MCHC 32.2 32.0 - 36.0 g/dL    RDW 12.9 11.5 - 14.5 %    Platelets 396 (H) 150 - 350 K/uL    MPV 9.2 9.2 - 12.9 fL    Immature Granulocytes Test Not Performed 0.0 - 0.5 %    Immature Grans (Abs) Test Not Performed 0.00 - 0.04 K/uL    nRBC 0 0 /100 WBC    Gran% 64.0 38.0 - 73.0 %    Lymph% 19.0 18.0 - 48.0 %    Mono% 4.0 4.0 - 15.0 %    Eosinophil% 11.0 (H) 0.0 - 8.0 %    Basophil% 0.0 0.0 - 1.9 %    Myelocytes 2.0 %    Platelet Estimate Increased (A)     Differential Method Manual    Comprehensive metabolic panel    Collection Time: 12/14/18  6:38 AM   Result Value Ref Range    Sodium 136 136 - 145 mmol/L    Potassium 3.7 3.5 - 5.1 mmol/L    Chloride 99 95 - 110 mmol/L    CO2 26 23 - 29 mmol/L "    Glucose 86 70 - 110 mg/dL    BUN, Bld 15 6 - 20 mg/dL    Creatinine 0.6 0.5 - 1.4 mg/dL    Calcium 9.2 8.7 - 10.5 mg/dL    Total Protein 7.1 6.0 - 8.4 g/dL    Albumin 3.2 (L) 3.5 - 5.2 g/dL    Total Bilirubin 0.7 0.1 - 1.0 mg/dL    Alkaline Phosphatase 59 55 - 135 U/L    AST 30 10 - 40 U/L    ALT 72 (H) 10 - 44 U/L    Anion Gap 11 8 - 16 mmol/L    eGFR if African American >60.0 >60 mL/min/1.73 m^2    eGFR if non African American >60.0 >60 mL/min/1.73 m^2   Magnesium    Collection Time: 18  6:38 AM   Result Value Ref Range    Magnesium 2.1 1.6 - 2.6 mg/dL   Phosphorus    Collection Time: 18  6:38 AM   Result Value Ref Range    Phosphorus 2.9 2.7 - 4.5 mg/dL         Significant Imagin18 EKG: Per independent resident review and interpretation,  Sinus tachycardia ().  QTc 453.        Assessment/Plan:     Bipolar disorder    Ashwin Madrid is a 38 y.o. male with a past psychiatric history of bipolar disorder (unspecified), ADHD, polysubstance abuse (prescription opioids, heroin), currently presenting as a transfer from [facility] on  for management of ARDS.  Psychiatry was consulted to address the patient's symptoms of management of bipolar medications.     Given complicated hospital course, waxing/waning symptoms, suspect a component of delirium in the setting of underlying bipolar disorder.     Not clear that current diaphoresis/anxiety is related to alcohol or benzodiazepine withdrawal given time since last drink and lack of improvement in objective findings (such as HR, BP) with doses of lorazepam on  AM.    Home medications on sheet from recent discharge from [place], and endorsed to be accurate per pt's sister:  Trileptal 300 BID  Melatonin 3 mg QHS  Elavil 100 mg QHS PRN  Gabapentin 900 TID   Lexapro 10 mg Qdaily  Topamax 25 mg BID  Wellbutrin  mg Qdaily  Geodon 80 at 1250 and 2000 daily    Recommendations  -Increase trileptal to 300 mg BID starting on  12/15 AM  -Ativan 2 mg Q4hrs PRN for at least two of the following: SBP>160, DBP>100, HR>110  -Seroquel 50 mg Q6hrs PRN for severe agitation  -Discontinue elavil PRN, haldol PRN.  -Ramelteon PRN  -EKG Qdaily  -Continue wellbutrin  Qdaily, lexapro 10 mg Qdaily, gabapentin 900 TID, and ziprasidone 80 mg BID.  -Ensure that geodon is given with food for proper absorption  - on opioid, alcohol cessation when appropriate.      Delirium Precautions  -Re-orient patient frequently and keep pt's whiteboard up to date with current day and team member's names  -Keep shades open/room lit during day  -Low light at night (close blinds at night)  -Low stimulation, minimize interruptions at night  -Minimize use of restraints  -Encourage family to be at bedside  -Avoid opiates, benzodiazepines, antihistamines, anticholinergics, hypnotics as much as possible           Alcohol abuse    1/2 pint per day  -Recommend cessation when appropriate     Opioid abuse    12/14: Pt states he relapsed with opioids prior to admission, unclear source.  Questionable historian.  -Recommend cessation when appropriate.     Acute metabolic encephalopathy    See bipolar section for discussion.          Need for Continued Hospitalization:   No need for inpatient psychiatric hospitalization. Continue medical care as per the primary team.    Anticipated Disposition: under ICU care     Total time:  35 with greater than 50% of this time spent in counseling and/or coordination of care.       David Purcell MD   Psychiatry  [facility]

## 2018-12-14 NOTE — PLAN OF CARE
12/14/18 1354   Right Care Assessment   Can the patient answer the patient profile reliably? No, cognitively impaired   How often would a person be available to care for the patient? Whenever needed   Describe the patient's ability to walk at the present time. Minor restrictions or changes   How does the patient rate their overall health at the present time? Good   Number of comorbid conditions (as recorded on the chart) Three   During the past month, has the patient often been bothered by feeling down, depressed or hopeless? No   During the past month, has the patient often been bothered by little interest or pleasure in doing things? No   Asiya Marquez RN, BSN, CCM  Case Management  [facility]  Ext. [phone]

## 2018-12-14 NOTE — ASSESSMENT & PLAN NOTE
- sister reports long term rehab for heroin abuse  - patient reports taking Roxicodone 25mg TID (not prescribed)  - high dose fentanyl infusion when intubated  - monitor for withdrawal

## 2018-12-15 PROBLEM — J18.9 PNEUMONIA: Status: RESOLVED | Noted: 2018-12-08 | Resolved: 2018-12-15

## 2018-12-15 PROBLEM — J96.01 ACUTE HYPOXEMIC RESPIRATORY FAILURE: Status: RESOLVED | Noted: 2018-12-12 | Resolved: 2018-12-15

## 2018-12-15 PROBLEM — G93.41 ACUTE METABOLIC ENCEPHALOPATHY: Status: RESOLVED | Noted: 2018-12-12 | Resolved: 2018-12-15

## 2018-12-15 LAB
ALBUMIN SERPL BCP-MCNC: 3.1 G/DL
ALP SERPL-CCNC: 57 U/L
ALT SERPL W/O P-5'-P-CCNC: 77 U/L
ANION GAP SERPL CALC-SCNC: 10 MMOL/L
ANISOCYTOSIS BLD QL SMEAR: SLIGHT
AST SERPL-CCNC: 30 U/L
BASO STIPL BLD QL SMEAR: ABNORMAL
BASOPHILS # BLD AUTO: ABNORMAL K/UL
BASOPHILS NFR BLD: 0 %
BILIRUB SERPL-MCNC: 0.5 MG/DL
BUN SERPL-MCNC: 21 MG/DL
CALCIUM SERPL-MCNC: 9.4 MG/DL
CHLORIDE SERPL-SCNC: 102 MMOL/L
CO2 SERPL-SCNC: 24 MMOL/L
CREAT SERPL-MCNC: 0.7 MG/DL
DIFFERENTIAL METHOD: ABNORMAL
EOSINOPHIL # BLD AUTO: ABNORMAL K/UL
EOSINOPHIL NFR BLD: 4 %
ERYTHROCYTE [DISTWIDTH] IN BLOOD BY AUTOMATED COUNT: 13.5 %
EST. GFR  (AFRICAN AMERICAN): >60 ML/MIN/1.73 M^2
EST. GFR  (NON AFRICAN AMERICAN): >60 ML/MIN/1.73 M^2
GLUCOSE SERPL-MCNC: 135 MG/DL
HCT VFR BLD AUTO: 40.2 %
HGB BLD-MCNC: 13.6 G/DL
IMM GRANULOCYTES # BLD AUTO: ABNORMAL K/UL
IMM GRANULOCYTES NFR BLD AUTO: ABNORMAL %
LYMPHOCYTES # BLD AUTO: ABNORMAL K/UL
LYMPHOCYTES NFR BLD: 23 %
MAGNESIUM SERPL-MCNC: 2.2 MG/DL
MCH RBC QN AUTO: 30.7 PG
MCHC RBC AUTO-ENTMCNC: 33.8 G/DL
MCV RBC AUTO: 91 FL
MONOCYTES # BLD AUTO: ABNORMAL K/UL
MONOCYTES NFR BLD: 7 %
NEUTROPHILS NFR BLD: 64 %
NEUTS BAND NFR BLD MANUAL: 2 %
NRBC BLD-RTO: 0 /100 WBC
PHOSPHATE SERPL-MCNC: 3.8 MG/DL
PLATELET # BLD AUTO: 419 K/UL
PLATELET BLD QL SMEAR: ABNORMAL
PMV BLD AUTO: 9.4 FL
POLYCHROMASIA BLD QL SMEAR: ABNORMAL
POTASSIUM SERPL-SCNC: 4.6 MMOL/L
PROT SERPL-MCNC: 6.8 G/DL
RBC # BLD AUTO: 4.43 M/UL
SODIUM SERPL-SCNC: 136 MMOL/L
WBC # BLD AUTO: 11.14 K/UL

## 2018-12-15 PROCEDURE — S4991 NICOTINE PATCH NONLEGEND: HCPCS | Performed by: HOSPITALIST

## 2018-12-15 PROCEDURE — 25000003 PHARM REV CODE 250: Performed by: HOSPITALIST

## 2018-12-15 PROCEDURE — 63600175 PHARM REV CODE 636 W HCPCS: Performed by: STUDENT IN AN ORGANIZED HEALTH CARE EDUCATION/TRAINING PROGRAM

## 2018-12-15 PROCEDURE — 25000003 PHARM REV CODE 250: Performed by: NURSE PRACTITIONER

## 2018-12-15 PROCEDURE — 80053 COMPREHEN METABOLIC PANEL: CPT

## 2018-12-15 PROCEDURE — 63600175 PHARM REV CODE 636 W HCPCS: Performed by: NURSE PRACTITIONER

## 2018-12-15 PROCEDURE — 93010 ELECTROCARDIOGRAM REPORT: CPT | Mod: ,,, | Performed by: INTERNAL MEDICINE

## 2018-12-15 PROCEDURE — 84100 ASSAY OF PHOSPHORUS: CPT

## 2018-12-15 PROCEDURE — 11000001 HC ACUTE MED/SURG PRIVATE ROOM

## 2018-12-15 PROCEDURE — 99233 SBSQ HOSP IP/OBS HIGH 50: CPT | Mod: ,,, | Performed by: NURSE PRACTITIONER

## 2018-12-15 PROCEDURE — 93005 ELECTROCARDIOGRAM TRACING: CPT

## 2018-12-15 PROCEDURE — 83735 ASSAY OF MAGNESIUM: CPT

## 2018-12-15 RX ORDER — NICOTINE 7MG/24HR
1 PATCH, TRANSDERMAL 24 HOURS TRANSDERMAL DAILY
Status: DISCONTINUED | OUTPATIENT
Start: 2018-12-15 | End: 2018-12-16 | Stop reason: HOSPADM

## 2018-12-15 RX ADMIN — BUPROPION HYDROCHLORIDE 450 MG: 150 TABLET, EXTENDED RELEASE ORAL at 08:12

## 2018-12-15 RX ADMIN — CEFTRIAXONE 2 G: 2 INJECTION, SOLUTION INTRAVENOUS at 01:12

## 2018-12-15 RX ADMIN — ESCITALOPRAM OXALATE 10 MG: 10 TABLET ORAL at 08:12

## 2018-12-15 RX ADMIN — ZIPRASIDONE HYDROCHLORIDE 80 MG: 40 CAPSULE ORAL at 04:12

## 2018-12-15 RX ADMIN — ENOXAPARIN SODIUM 40 MG: 100 INJECTION SUBCUTANEOUS at 04:12

## 2018-12-15 RX ADMIN — GABAPENTIN 900 MG: 300 CAPSULE ORAL at 02:12

## 2018-12-15 RX ADMIN — DIAZEPAM 10 MG: 5 TABLET ORAL at 06:12

## 2018-12-15 RX ADMIN — ZIPRASIDONE HYDROCHLORIDE 80 MG: 40 CAPSULE ORAL at 08:12

## 2018-12-15 RX ADMIN — GABAPENTIN 900 MG: 300 CAPSULE ORAL at 08:12

## 2018-12-15 RX ADMIN — FOLIC ACID 1 MG: 1 TABLET ORAL at 08:12

## 2018-12-15 RX ADMIN — OXCARBAZEPINE 300 MG: 300 TABLET, FILM COATED ORAL at 08:12

## 2018-12-15 RX ADMIN — DIAZEPAM 5 MG: 5 TABLET ORAL at 10:12

## 2018-12-15 RX ADMIN — ACETAMINOPHEN 650 MG: 325 TABLET, FILM COATED ORAL at 10:12

## 2018-12-15 RX ADMIN — NICOTINE 1 PATCH: 7 PATCH, EXTENDED RELEASE TRANSDERMAL at 01:12

## 2018-12-15 RX ADMIN — ACETAMINOPHEN 650 MG: 325 TABLET, FILM COATED ORAL at 04:12

## 2018-12-15 RX ADMIN — Medication 100 MG: at 08:12

## 2018-12-15 RX ADMIN — DIAZEPAM 5 MG: 5 TABLET ORAL at 01:12

## 2018-12-15 NOTE — ASSESSMENT & PLAN NOTE
--was previously in a long term rehab for heroin abuse and patient reports taking Roxicodone 25mg TID prior to admisison (not prescribed)

## 2018-12-15 NOTE — PROGRESS NOTES
[facility]  Critical Care Medicine  Progress Note    Patient Name: Ashwin Madrid  MRN: [id number]  Admission Date: 12/9/2018  Hospital Length of Stay: 6 days  Code Status: Full Code  Attending Provider: Aleix Dominguez MD  Primary Care Provider: Primary Doctor No   Principal Problem: ARDS (adult respiratory distress syndrome)    Subjective:     HPI:  Mr Madrid is a 37 yo M w PMHx significant for polysubstance abuse, bipolar disorder, ADHD, and GERD who was transferred to [facility] on 12/9  from [facility] for higher level of care due to ARDS and need for proning.      As patient was intubated on arrival, the majority of the history was collected from transfer paperwork and chart review. Patient presented to OSH with 3-day history of progressively worsening cough, fever and shortness of breath with associated decrease in PO intake and urine output. On admission, he denied IVDU and stated that he had abstained from his drug of choice (methamphetamine). On admission to OSH, he was noted to have leukocytosis with WBC of 21.4, fever of 102.7F, tachycardia of 127, tachypnea of 41 and hypoxemia with SpO2 of 84%. Imaging studies revealed RLL infiltrate concerning for community-acquired pneumonia.     Over the course of his 24 hours at OSH, his supplementary oxygen requirements steadily increased from nasal cannula to nonrebreather to BiPAP to ultimately intubation. Repeat CXR revealed development of bilateral infiltrates concerning for ARDS; RoA6-kf-VcD8 ratio was 54 and he was paralyzed with intermittent rocuronium.     Labs at OSH also revealed a transaminitis with AST of 186 and ALT of 48.     Hospital/ICU Course:  Transferred to MICU on 12/9 intubated, paralyzed, and sedated. Prone positioning for P:F 54. Supinated on 12/10 at 0930, P:F 200. Paralytic discontinued with severe agitation on fentanyl, versed, precedex, and dilaudid pushes. Low dose propofol used for a short  time, then changed to ketamine. Vent weaned according to ARDSnet protocol. Bipolar meds restarted on 12/11. Diuresed aggressively with electrolyte replacement. Versed discontinued on 12/11. Extubated to comfort flow on 12/12 and oxygen requirements continued to improve. He is now on room air. Intermittent agitation requiring precedex infusion and sitter. Psych consulted for management of bipolar medications and regimen restarted. He began to exhibit signs of possible benzo or alcohol withdrawal on 12/14 concern for symptoms of alcohol withdrawal so benzo taper started with improvement in symptoms.     Interval History/Significant Events:  Less anxious and agitated today. Reports feeling well.     Review of Systems   Constitutional: Negative for chills and fever.   Respiratory: Negative for cough, chest tightness and shortness of breath.    Cardiovascular: Negative for chest pain.   Gastrointestinal: Negative for abdominal distention, abdominal pain, blood in stool, constipation, diarrhea, nausea and vomiting.   Genitourinary: Negative for frequency.   Neurological: Negative for dizziness, syncope, speech difficulty, weakness, light-headedness and headaches.     Objective:     Vital Signs (Most Recent):  Temp: 98.3 °F (36.8 °C) (12/15/18 0700)  Pulse: 103 (12/15/18 0900)  Resp: 15 (12/15/18 0900)  BP: (!) 121/93 (12/15/18 0900)  SpO2: (!) 94 % (12/15/18 0900) Vital Signs (24h Range):  Temp:  [98 °F (36.7 °C)-98.8 °F (37.1 °C)] 98.3 °F (36.8 °C)  Pulse:  [] 103  Resp:  [15-29] 15  SpO2:  [90 %-99 %] 94 %  BP: ()/(50-93) 121/93   Weight: 74.4 kg (164 lb)  Body mass index is 22.87 kg/m².      Intake/Output Summary (Last 24 hours) at 12/15/2018 1045  Last data filed at 12/15/2018 0700  Gross per 24 hour   Intake 702 ml   Output 130 ml   Net 572 ml       Physical Exam   Constitutional: He is oriented to person, place, and time. He appears well-developed. He has a sickly appearance. No distress.   HENT:   Head:  Normocephalic and atraumatic.   Eyes: Conjunctivae are normal. Pupils are equal, round, and reactive to light. Right eye exhibits no discharge. Left eye exhibits no discharge. No scleral icterus.   Neck: Normal range of motion. Neck supple. No JVD present. No tracheal deviation present. No thyromegaly present.   Cardiovascular: Normal rate, regular rhythm, S1 normal and S2 normal.   Pulses:       Radial pulses are 2+ on the right side, and 2+ on the left side.   Pulmonary/Chest: No accessory muscle usage. No respiratory distress. He has no decreased breath sounds. He has no wheezes. He has no rales.   Abdominal: Soft. Bowel sounds are normal. He exhibits no distension. There is no guarding.   Lymphadenopathy:     He has no cervical adenopathy.   Neurological: He is alert and oriented to person, place, and time. GCS eye subscore is 4. GCS verbal subscore is 5.   Alert, conversant. Ambulating without difficulty    Skin: Skin is dry. He is not diaphoretic. There is pallor.       Vents:  Vent Mode: A/C (12/12/18 0900)  Ventilator Initiated: Yes (12/09/18 0200)  Set Rate: 12 bmp (12/12/18 0900)  Vt Set: 0 mL (12/12/18 0900)  Pressure Support: 0 cmH20 (12/12/18 0900)  PEEP/CPAP: 10 cmH20 (12/12/18 0900)  Oxygen Concentration (%): 40 (12/13/18 0700)  Peak Airway Pressure: 31 cmH2O (12/12/18 0900)  Plateau Pressure: 28 cmH20 (12/12/18 0900)  Total Ve: 6.34 mL (12/12/18 0900)  F/VT Ratio<105 (RSBI): 111.94 (12/12/18 0807)  Lines/Drains/Airways     Peripheral Intravenous Line                 Peripheral IV - Single Lumen 12/14/18 2013 Left;Posterior Hand less than 1 day              Significant Labs:    CBC/Anemia Profile:  Recent Labs   Lab 12/14/18  0638 12/15/18  0347   WBC 8.63 11.14   HGB 11.9* 13.6*   HCT 36.9* 40.2   * 419*   MCV 95 91   RDW 12.9 13.5        Chemistries:  Recent Labs   Lab 12/14/18  0638 12/15/18  0443    136   K 3.7 4.6   CL 99 102   CO2 26 24   BUN 15 21*   CREATININE 0.6 0.7   CALCIUM  9.2 9.4   ALBUMIN 3.2* 3.1*   PROT 7.1 6.8   BILITOT 0.7 0.5   ALKPHOS 59 57   ALT 72* 77*   AST 30 30   MG 2.1 2.2   PHOS 2.9 3.8         Significant Imaging:  I have reviewed all pertinent imaging results/findings within the past 24 hours.      ABG  Recent Labs   Lab 12/12/18  0807   PH 7.492*   PO2 70*   PCO2 41.8   HCO3 32.0*   BE 9     Assessment/Plan:     Psychiatric   Anxiety    Home regimen includes propranolol and escitalopram    - Will hold while sedated     Opioid abuse    --was previously in a long term rehab for heroin abuse and patient reports taking Roxicodone 25mg TID prior to admisison (not prescribed)       Bipolar disorder    --continue medication regimen per psych recommendations. Appreciate assistance       Alcohol abuse    --patient reports drinking 1/2 of pint to 1 pint a day of vodka and has history of withdrawal seizures in the past  --continue valium taper, PRN ativan  --thiamin, folate replacement      Pulmonary   * ARDS (adult respiratory distress syndrome)    --2/2 pneumonia; severe and required paralytics and proning upon admission   --now resolved and on room air.   --last dose rocephin today to finish course for CAP.      Cardiac/Vascular   Hypertriglyceridemia    --downtrending with discontinuation of propofol.                  Immunology/Multi System   Hepatitis C antibody positive in blood    --RNA negative      GI   Transaminitis    --initial  and ALT 59  --EtOH <10  --liver enzymes improving.          Other   Primary insomnia    Home regimen includes amitriptyline 10 QHS PRN and melatonin QHS     - Will hold while sedated       I spent >35 minutes reviewing patient records, examining, and counseling the patient with greater than 50% of the time spent with direct patient care and coordination.          Marlene Estrella NP  Critical Care Medicine  [facility]

## 2018-12-15 NOTE — PROGRESS NOTES
"[facility]  Psychiatry  Progress Note    Patient Name: Ashwin Madrid  MRN: [id number]   Code Status: Full Code  Admission Date: 12/9/2018  Hospital Length of Stay: 6 days  Expected Discharge Date:   Attending Physician: Wesley Vogt MD  Primary Care Provider: Primary Doctor No    Current Legal Status: N/A    Patient information was obtained from patient and past medical records.     Subjective:     Principal Problem:ARDS (adult respiratory distress syndrome)    Chief Complaint: Delerium    HPI:   Per Primary MD:  "Mr Madrid is a 39 yo M w PMHx significant for COPD (nil PFT), bipolar disorder, ADHD, polysubstance abuse and GERD who was transferred to [facility] from [facility] d/t concern for ARDS requiring proning.      As pt was intubated on arrival, the majority of the history was collected from transfer paperwork and chart review. Pt presented to OSH with 3-day history of progressively worsening cough, fever and shortness of breath with associated decrease in PO intake and urine output. On admission, he denied IVDU and stated that he had abstained from his drug of choice (methamphetamine). On admission to OSH, he was noted to have leukocytosis with WBC of 21.4, fever of 102.7F, tachycardia of 127, tachypnea of 41 and hypoxemia with SpO2 of 84%. Imaging studies revealed RLL infiltrate concerning for community-acquired pneumonia.      Over the course of his 24 hours at OSH, his supplementary oxygen requirements steadily increased from nasal cannula to nonrebreather to BiPAP to ultimately intubation. Repeat CXR revealed development of bilateral infiltrates concerning for ARDS; RuJ8-ln-GxN6 ratio was 54 and he was paralyzed with intermittent rocuronium.      Labs at OSH also revealed a transaminitis with AST of 186 and ALT of 48."     Per Psychiatry MD:   Ashwin Madrid is a 38 y.o. male with a past psychiatric history of bipolar disorder (unspecified), ADHD, polysubstance abuse " (prescription opioids, heroin), currently presenting as a transfer from [facility] on 12/9 for management of ARDS.  Psychiatry was consulted to address the patient's symptoms of management of bipolar medications.     Since arrival, pt was extubated on 12/12/18.  RN reports pressured speech at that time, intermittent agitation.  On initial resident interview, pt answered questions for only a few minutes before declining any further questions.  I spoke with pt's sister, Amanda ([phone]) for most of the history.  She reports that pt was hospitalized at [facility] from 4/20 - 11/2018 for opioid/heroin abuse.  He was discharged in 11/2018 with plans to f/u locally at [place].  Sister states he was seen there at least once since discharge, but no medication changes, and she does not recall the name of a psychiatrist/psychologist there.       Collateral:   Amanda (sister) [phone]    SUBJECTIVE   Currently, the patient is endorsing the following:    Medical Review Of Systems:  Limited 2/2 patient participation  Constitutional: negative for fevers  Respiratory: positive for increased work of breathing  Behavioral/Psych: positive for agitation, confusion    Psychiatric Review Of Systems - Is patient experiencing or having changes in:  Limited 2/2 pt participation  sleep: yes  Pressured speech:  yes    Past Medical/Surgical History:  Past Medical History:   Diagnosis Date    ADHD (attention deficit hyperactivity disorder) 2/7/2012    Anxiety     Bipolar 1 disorder 2/7/2012    Depression     GERD (gastroesophageal reflux disease)       has a past medical history of ADHD (attention deficit hyperactivity disorder) (2/7/2012), Anxiety, Bipolar 1 disorder (2/7/2012), Depression, and GERD (gastroesophageal reflux disease).  Past Surgical History:   Procedure Laterality Date    EGD (ESOPHAGOGASTRODUODENOSCOPY) N/A 9/14/2012    Performed by Juancarlos So Jr., MD at [facility]     ESOPHAGOGASTRODUODENOSCOPY         Past Psychiatric History:  Previous Medication Trials: Yes - uncertain which medications  Previous Psychiatric Hospitalizations: Yes - [facility] April - November 2018  Previous Suicide Attempts: No  History of Violence: No  Outpatient Psychiatrist: Yes - In [place], uncertain name  Outpatient Psychotherapist: Yes - In [place], uncertain name    Social History:  Marital Status: not   Children: 0   Employment Status/Info: unemployed  Education: 10th grade  Special Ed: no  Housing/Lives with: mom, sister (Amanda)  History of phys/sexual abuse: No  Access to gun: No    Substance Abuse History:  Recreational Drugs: History of heroin, prescription opioids  Use of Alcohol: denied  Tobacco Use:yes 2 ppd    Functioning Relationships: good support system    Psychosocial Factors:  Peer group, social, ethic, cultural, emotional, and health factors: lives with family  Living situation, family constellation, family circumstances/home: lives with family, sister provides significant support    Hospital Course: 12/14/2018  Pt refuses a full interview.    12/15/2018  Pt is up walking around the room and agreeable to speak. He is linear and oriented. Pt endorses anxiety and requests Ativan. He is educated on Benzos not being optimal treatment selections for chronic anxiety. When asked about his Seroquel, pt reports he can't take Seroquel because it gives him nightmares. Pt is encouraged to utilize hydroxyzine if he becomes anxious. He denies SI/HI/AVH.      Family History     None        Tobacco Use    Smoking status: Current Every Day Smoker     Packs/day: 1.00     Years: 10.00     Pack years: 10.00     Types: Cigarettes    Smokeless tobacco: Never Used   Substance and Sexual Activity    Alcohol use: No    Drug use: No     Comment: history of MJ at age 17, pain pills, mushrooms, Sober for last 1.5 years    Sexual activity: Yes     Partners: Female     Psychotherapeutics (From  "admission, onward)    Start     Stop Route Frequency Ordered    12/15/18 1400  diazePAM tablet 5 mg      12/16 1359 Oral Every 8 hours 12/14/18 1721    12/14/18 1734  ramelteon tablet 8 mg      -- Oral Nightly PRN 12/14/18 1635    12/14/18 1715  ziprasidone capsule 80 mg      -- Oral 2 times daily with meals 12/14/18 1304    12/14/18 1631  lorazepam injection 2 mg      -- IV Every 4 hours PRN 12/14/18 1632    12/13/18 0900  buPROPion TB24 tablet 450 mg      -- Oral Daily 12/12/18 1643    12/13/18 0900  escitalopram oxalate tablet 10 mg      -- Oral Daily 12/12/18 1643           Review of Systems   Respiratory: Negative for shortness of breath.    Cardiovascular: Negative for chest pain.   Gastrointestinal: Negative for abdominal pain.   Neurological: Negative for weakness and headaches.   Psychiatric/Behavioral: Negative for hallucinations and suicidal ideas. The patient is not nervous/anxious.      Objective:     Vital Signs (Most Recent):  Temp: 97.5 °F (36.4 °C) (12/15/18 1119)  Pulse: 98 (12/15/18 1119)  Resp: 11 (12/15/18 1119)  BP: 117/68 (12/15/18 1119)  SpO2: 98 % (12/15/18 1119) Vital Signs (24h Range):  Temp:  [97.5 °F (36.4 °C)-98.8 °F (37.1 °C)] 97.5 °F (36.4 °C)  Pulse:  [] 98  Resp:  [11-29] 11  SpO2:  [90 %-98 %] 98 %  BP: ()/(50-93) 117/68     Height: 5' 11" (180.3 cm)  Weight: 74.4 kg (164 lb)  Body mass index is 22.87 kg/m².      Intake/Output Summary (Last 24 hours) at 12/15/2018 1540  Last data filed at 12/15/2018 0700  Gross per 24 hour   Intake 702 ml   Output --   Net 702 ml       Physical Exam   Constitutional: He appears well-developed. No distress.   Cardiovascular: Regular rhythm.   No murmur heard.  tachycardic   Pulmonary/Chest: Effort normal and breath sounds normal. No stridor. No respiratory distress.   Abdominal: Soft. Bowel sounds are normal. He exhibits no distension. There is no tenderness.   Neurological: He is alert.   Psychiatric:   Mental Status " "Exam:    Abbreviated, pt refuses full interview.    Appearance: older than stated age  Level of Consciousness: Alert  Behavior/Cooperation: cooperative  Psychomotor: fidgity  Speech: normal rate, tone, volume  Language: english, fluid  Orientation: month/year, not date.  Attention Span/Concentration: normal.  Memory: intact  Mood: "alright"  Affect: neutral  Thought Process: goal directed  Insight: poor  Judgment: poor    CAM-ICU Negative.           Significant Labs:   Recent Results (from the past 24 hour(s))   CBC auto differential    Collection Time: 12/15/18  3:47 AM   Result Value Ref Range    WBC 11.14 3.90 - 12.70 K/uL    RBC 4.43 (L) 4.60 - 6.20 M/uL    Hemoglobin 13.6 (L) 14.0 - 18.0 g/dL    Hematocrit 40.2 40.0 - 54.0 %    MCV 91 82 - 98 fL    MCH 30.7 27.0 - 31.0 pg    MCHC 33.8 32.0 - 36.0 g/dL    RDW 13.5 11.5 - 14.5 %    Platelets 419 (H) 150 - 350 K/uL    MPV 9.4 9.2 - 12.9 fL    Immature Granulocytes CANCELED 0.0 - 0.5 %    Immature Grans (Abs) CANCELED 0.00 - 0.04 K/uL    Lymph # CANCELED 1.0 - 4.8 K/uL    Mono # CANCELED 0.3 - 1.0 K/uL    Eos # CANCELED 0.0 - 0.5 K/uL    Baso # CANCELED 0.00 - 0.20 K/uL    nRBC 0 0 /100 WBC    Gran% 64.0 38.0 - 73.0 %    Lymph% 23.0 18.0 - 48.0 %    Mono% 7.0 4.0 - 15.0 %    Eosinophil% 4.0 0.0 - 8.0 %    Basophil% 0.0 0.0 - 1.9 %    Bands 2.0 %    Platelet Estimate Increased (A)     Aniso Slight     Poly Occasional     Basophilic Stippling Occasional     Differential Method Manual    Comprehensive metabolic panel    Collection Time: 12/15/18  4:43 AM   Result Value Ref Range    Sodium 136 136 - 145 mmol/L    Potassium 4.6 3.5 - 5.1 mmol/L    Chloride 102 95 - 110 mmol/L    CO2 24 23 - 29 mmol/L    Glucose 135 (H) 70 - 110 mg/dL    BUN, Bld 21 (H) 6 - 20 mg/dL    Creatinine 0.7 0.5 - 1.4 mg/dL    Calcium 9.4 8.7 - 10.5 mg/dL    Total Protein 6.8 6.0 - 8.4 g/dL    Albumin 3.1 (L) 3.5 - 5.2 g/dL    Total Bilirubin 0.5 0.1 - 1.0 mg/dL    Alkaline Phosphatase 57 55 - " 135 U/L    AST 30 10 - 40 U/L    ALT 77 (H) 10 - 44 U/L    Anion Gap 10 8 - 16 mmol/L    eGFR if African American >60.0 >60 mL/min/1.73 m^2    eGFR if non African American >60.0 >60 mL/min/1.73 m^2   Magnesium    Collection Time: 12/15/18  4:43 AM   Result Value Ref Range    Magnesium 2.2 1.6 - 2.6 mg/dL   Phosphorus    Collection Time: 12/15/18  4:43 AM   Result Value Ref Range    Phosphorus 3.8 2.7 - 4.5 mg/dL         Significant Imagin18 EKG: Per independent resident review and interpretation,  Sinus tachycardia ().  QTc 453.        Assessment/Plan:     Alcohol abuse    1/2 pint per day  - queried on  negative for any recent benzo or opioid prescriptions  -Recommend cessation when appropriate     Opioid abuse    : Pt states he relapsed with opioids prior to admission, unclear source.  Questionable historian.  - queried on  negative for any recent benzo or opioid prescriptions  -Recommend cessation when appropriate.     Bipolar disorder    Ashwin Madrid is a 38 y.o. male with a past psychiatric history of bipolar disorder (unspecified), ADHD, polysubstance abuse (prescription opioids, heroin), currently presenting as a transfer from [facility] on  for management of ARDS.  Psychiatry was consulted to address the patient's symptoms of management of bipolar medications.     Given complicated hospital course, waxing/waning symptoms, suspect a component of delirium in the setting of underlying bipolar disorder.     Not clear that current diaphoresis/anxiety is related to alcohol or benzodiazepine withdrawal given time since last drink and lack of improvement in objective findings (such as HR, BP) with doses of lorazepam on  AM.    Home medications on sheet from recent discharge from [facility], and endorsed to be accurate per pt's sister:  Trileptal 300 BID  Melatonin 3 mg QHS  Elavil 100 mg QHS PRN  Gabapentin 900 TID   Lexapro 10 mg Qdaily  Topamax 25 mg  BID  Wellbutrin  mg Qdaily  Geodon 80 at 1250 and 2000 daily    Recommendations  -Increase trileptal to 300 mg BID starting on 12/15 AM  -Ativan 2 mg Q4hrs PRN for at least two of the following: SBP>160, DBP>100, HR>110  -Seroquel 50 mg Q6hrs PRN for severe agitation  -Discontinue elavil PRN, haldol PRN.  -Ramelteon PRN  -EKG Qdaily  -Continue wellbutrin  Qdaily, lexapro 10 mg Qdaily, gabapentin 900 TID, and ziprasidone 80 mg BID.  -Ensure that geodon is given with food for proper absorption  - on opioid, alcohol cessation when appropriate.      Delirium Precautions  -Re-orient patient frequently and keep pt's whiteboard up to date with current day and team member's names  -Keep shades open/room lit during day  -Low light at night (close blinds at night)  -Low stimulation, minimize interruptions at night  -Minimize use of restraints  -Encourage family to be at bedside  -Avoid opiates, benzodiazepines, antihistamines, anticholinergics, hypnotics as much as possible                Need for Continued Hospitalization:   No need for inpatient psychiatric hospitalization. Continue medical care as per the primary team.    Anticipated Disposition: ICU care    Total time:  25 with greater than 50% of this time spent in counseling and/or coordination of care.       Renee Galo MD   Psychiatry  [facility]

## 2018-12-15 NOTE — RESIDENT HANDOFF
ICU Transfer of Care Note  Critical Care Medicine    Admit Date: 12/9/2018  LOS: 6    CC: ARDS (adult respiratory distress syndrome)    Code Status: Full Code     Stepdown to HM confirmed by Dr. Morton via Secure Chat at 9:50 am. Patient to be placed on HM R with Dr. Buck Negrete    HPI and Hospital Course:     HPI:  Mr Madrid is a 37 yo M w PMHx significant for polysubstance abuse, bipolar disorder, ADHD, and GERD who was transferred to [facility] on 12/9  [facility] for higher level of care due to ARDS and need for proning.      As patient was intubated on arrival, the majority of the history was collected from transfer paperwork and chart review. Patient presented to OSH with 3-day history of progressively worsening cough, fever and shortness of breath with associated decrease in PO intake and urine output. On admission, he denied IVDU and stated that he had abstained from his drug of choice (methamphetamine). On admission to OSH, he was noted to have leukocytosis with WBC of 21.4, fever of 102.7F, tachycardia of 127, tachypnea of 41 and hypoxemia with SpO2 of 84%. Imaging studies revealed RLL infiltrate concerning for community-acquired pneumonia.     Over the course of his 24 hours at OSH, his supplementary oxygen requirements steadily increased from nasal cannula to nonrebreather to BiPAP to ultimately intubation. Repeat CXR revealed development of bilateral infiltrates concerning for ARDS; AzD3-md-PkX7 ratio was 54 and he was paralyzed with intermittent rocuronium.     Labs at OSH also revealed a transaminitis with AST of 186 and ALT of 48.     Hospital/ICU Course:  Transferred to MICU on 12/9 intubated, paralyzed, and sedated. Prone positioning for P:F 54. Supinated on 12/10 at 0930, P:F 200. Paralytic discontinued with severe agitation on fentanyl, versed, precedex, and dilaudid pushes. Low dose propofol used for a short time, then changed to ketamine. Vent weaned according to ARDSnet  protocol. Bipolar meds restarted on 12/11. Diuresed aggressively with electrolyte replacement. Versed discontinued on 12/11. Extubated to comfort flow on 12/12 and oxygen requirements continued to improve. He is now on room air. Intermittent agitation requiring precedex infusion and sitter. Psych consulted for management of bipolar medications and regimen restarted. He began to exhibit signs of possible benzo or alcohol withdrawal on 12/14 concern for symptoms of alcohol withdrawal so benzo taper started with improvement in symptoms.       To Follow Up:   --signs and symptoms of withdrawal. May need prolonged benzo taper.       Discharge Plan:     Likely home.     Call with questions.

## 2018-12-15 NOTE — ASSESSMENT & PLAN NOTE
--2/2 pneumonia; severe and required paralytics and proning upon admission   --now resolved and on room air.   --last dose rocephin today to finish course for CAP.

## 2018-12-15 NOTE — ASSESSMENT & PLAN NOTE
--patient reports drinking 1/2 of pint to 1 pint a day of vodka and has history of withdrawal seizures in the past  --continue valium taper, PRN ativan  --thiamin, folate replacement

## 2018-12-15 NOTE — NURSING TRANSFER
Nursing Transfer Note      12/15/2018     Transfer To: [id number]    Transfer via wheelchair    Transfer with cardiac monitoring    Transported by [email or web address]    Medicines sent: Yes    Chart send with patient: Yes    Notified: aunt    Upon arrival to floor: patient oriented to room, call bell in reach and bed in lowest position

## 2018-12-15 NOTE — PLAN OF CARE
Problem: Adult Inpatient Plan of Care  Goal: Plan of Care Review  Outcome: Ongoing (interventions implemented as appropriate)  No acute events throughout day. See vital signs and assessments in flowsheets. See below for updates on today's progress. VSS on RA. No gtts. Psych seen, recommended changes in meds. Valium started for possible withdrawals - pt diaphoretic and tachycardic this AM. Pt appears less anxious, not diaphoretic anymore, and HR 100s-110s. Plan to step down to floor tomorrow. Patient progressing towards goals as tolerated, plan of care communicated and reviewed with Ashwin Madrid and family. All concerns addressed. Will continue to monitor.

## 2018-12-15 NOTE — SUBJECTIVE & OBJECTIVE
"  Family History     None        Tobacco Use    Smoking status: Current Every Day Smoker     Packs/day: 1.00     Years: 10.00     Pack years: 10.00     Types: Cigarettes    Smokeless tobacco: Never Used   Substance and Sexual Activity    Alcohol use: No    Drug use: No     Comment: history of MJ at age 17, pain pills, mushrooms, Sober for last 1.5 years    Sexual activity: Yes     Partners: Female     Psychotherapeutics (From admission, onward)    Start     Stop Route Frequency Ordered    12/15/18 1400  diazePAM tablet 5 mg      12/16 1359 Oral Every 8 hours 12/14/18 1721    12/14/18 1734  ramelteon tablet 8 mg      -- Oral Nightly PRN 12/14/18 1635    12/14/18 1715  ziprasidone capsule 80 mg      -- Oral 2 times daily with meals 12/14/18 1304    12/14/18 1631  lorazepam injection 2 mg      -- IV Every 4 hours PRN 12/14/18 1632    12/13/18 0900  buPROPion TB24 tablet 450 mg      -- Oral Daily 12/12/18 1643    12/13/18 0900  escitalopram oxalate tablet 10 mg      -- Oral Daily 12/12/18 1643           Review of Systems   Respiratory: Negative for shortness of breath.    Cardiovascular: Negative for chest pain.   Gastrointestinal: Negative for abdominal pain.   Neurological: Negative for weakness and headaches.   Psychiatric/Behavioral: Negative for hallucinations and suicidal ideas. The patient is not nervous/anxious.      Objective:     Vital Signs (Most Recent):  Temp: 97.5 °F (36.4 °C) (12/15/18 1119)  Pulse: 98 (12/15/18 1119)  Resp: 11 (12/15/18 1119)  BP: 117/68 (12/15/18 1119)  SpO2: 98 % (12/15/18 1119) Vital Signs (24h Range):  Temp:  [97.5 °F (36.4 °C)-98.8 °F (37.1 °C)] 97.5 °F (36.4 °C)  Pulse:  [] 98  Resp:  [11-29] 11  SpO2:  [90 %-98 %] 98 %  BP: ()/(50-93) 117/68     Height: 5' 11" (180.3 cm)  Weight: 74.4 kg (164 lb)  Body mass index is 22.87 kg/m².      Intake/Output Summary (Last 24 hours) at 12/15/2018 1540  Last data filed at 12/15/2018 0700  Gross per 24 hour   Intake 702 ml " "  Output --   Net 702 ml       Physical Exam   Constitutional: He appears well-developed. No distress.   Cardiovascular: Regular rhythm.   No murmur heard.  tachycardic   Pulmonary/Chest: Effort normal and breath sounds normal. No stridor. No respiratory distress.   Abdominal: Soft. Bowel sounds are normal. He exhibits no distension. There is no tenderness.   Neurological: He is alert.   Psychiatric:   Mental Status Exam:    Abbreviated, pt refuses full interview.    Appearance: older than stated age  Level of Consciousness: Alert  Behavior/Cooperation: cooperative  Psychomotor: fidgity  Speech: normal rate, tone, volume  Language: english, fluid  Orientation: month/year, not date.  Attention Span/Concentration: normal.  Memory: intact  Mood: "alright"  Affect: neutral  Thought Process: goal directed  Insight: poor  Judgment: poor    CAM-ICU Negative.           Significant Labs:   Recent Results (from the past 24 hour(s))   CBC auto differential    Collection Time: 12/15/18  3:47 AM   Result Value Ref Range    WBC 11.14 3.90 - 12.70 K/uL    RBC 4.43 (L) 4.60 - 6.20 M/uL    Hemoglobin 13.6 (L) 14.0 - 18.0 g/dL    Hematocrit 40.2 40.0 - 54.0 %    MCV 91 82 - 98 fL    MCH 30.7 27.0 - 31.0 pg    MCHC 33.8 32.0 - 36.0 g/dL    RDW 13.5 11.5 - 14.5 %    Platelets 419 (H) 150 - 350 K/uL    MPV 9.4 9.2 - 12.9 fL    Immature Granulocytes CANCELED 0.0 - 0.5 %    Immature Grans (Abs) CANCELED 0.00 - 0.04 K/uL    Lymph # CANCELED 1.0 - 4.8 K/uL    Mono # CANCELED 0.3 - 1.0 K/uL    Eos # CANCELED 0.0 - 0.5 K/uL    Baso # CANCELED 0.00 - 0.20 K/uL    nRBC 0 0 /100 WBC    Gran% 64.0 38.0 - 73.0 %    Lymph% 23.0 18.0 - 48.0 %    Mono% 7.0 4.0 - 15.0 %    Eosinophil% 4.0 0.0 - 8.0 %    Basophil% 0.0 0.0 - 1.9 %    Bands 2.0 %    Platelet Estimate Increased (A)     Aniso Slight     Poly Occasional     Basophilic Stippling Occasional     Differential Method Manual    Comprehensive metabolic panel    Collection Time: 12/15/18  4:43 AM "   Result Value Ref Range    Sodium 136 136 - 145 mmol/L    Potassium 4.6 3.5 - 5.1 mmol/L    Chloride 102 95 - 110 mmol/L    CO2 24 23 - 29 mmol/L    Glucose 135 (H) 70 - 110 mg/dL    BUN, Bld 21 (H) 6 - 20 mg/dL    Creatinine 0.7 0.5 - 1.4 mg/dL    Calcium 9.4 8.7 - 10.5 mg/dL    Total Protein 6.8 6.0 - 8.4 g/dL    Albumin 3.1 (L) 3.5 - 5.2 g/dL    Total Bilirubin 0.5 0.1 - 1.0 mg/dL    Alkaline Phosphatase 57 55 - 135 U/L    AST 30 10 - 40 U/L    ALT 77 (H) 10 - 44 U/L    Anion Gap 10 8 - 16 mmol/L    eGFR if African American >60.0 >60 mL/min/1.73 m^2    eGFR if non African American >60.0 >60 mL/min/1.73 m^2   Magnesium    Collection Time: 12/15/18  4:43 AM   Result Value Ref Range    Magnesium 2.2 1.6 - 2.6 mg/dL   Phosphorus    Collection Time: 12/15/18  4:43 AM   Result Value Ref Range    Phosphorus 3.8 2.7 - 4.5 mg/dL         Significant Imagin18 EKG: Per independent resident review and interpretation,  Sinus tachycardia ().  QTc 453.

## 2018-12-15 NOTE — SUBJECTIVE & OBJECTIVE
Interval History/Significant Events:  Less anxious and agitated today. Reports feeling well.     Review of Systems   Constitutional: Negative for chills and fever.   Respiratory: Negative for cough, chest tightness and shortness of breath.    Cardiovascular: Negative for chest pain.   Gastrointestinal: Negative for abdominal distention, abdominal pain, blood in stool, constipation, diarrhea, nausea and vomiting.   Genitourinary: Negative for frequency.   Neurological: Negative for dizziness, syncope, speech difficulty, weakness, light-headedness and headaches.     Objective:     Vital Signs (Most Recent):  Temp: 98.3 °F (36.8 °C) (12/15/18 0700)  Pulse: 103 (12/15/18 0900)  Resp: 15 (12/15/18 0900)  BP: (!) 121/93 (12/15/18 0900)  SpO2: (!) 94 % (12/15/18 0900) Vital Signs (24h Range):  Temp:  [98 °F (36.7 °C)-98.8 °F (37.1 °C)] 98.3 °F (36.8 °C)  Pulse:  [] 103  Resp:  [15-29] 15  SpO2:  [90 %-99 %] 94 %  BP: ()/(50-93) 121/93   Weight: 74.4 kg (164 lb)  Body mass index is 22.87 kg/m².      Intake/Output Summary (Last 24 hours) at 12/15/2018 1045  Last data filed at 12/15/2018 0700  Gross per 24 hour   Intake 702 ml   Output 130 ml   Net 572 ml       Physical Exam   Constitutional: He is oriented to person, place, and time. He appears well-developed. He has a sickly appearance. No distress.   HENT:   Head: Normocephalic and atraumatic.   Eyes: Conjunctivae are normal. Pupils are equal, round, and reactive to light. Right eye exhibits no discharge. Left eye exhibits no discharge. No scleral icterus.   Neck: Normal range of motion. Neck supple. No JVD present. No tracheal deviation present. No thyromegaly present.   Cardiovascular: Normal rate, regular rhythm, S1 normal and S2 normal.   Pulses:       Radial pulses are 2+ on the right side, and 2+ on the left side.   Pulmonary/Chest: No accessory muscle usage. No respiratory distress. He has no decreased breath sounds. He has no wheezes. He has no rales.    Abdominal: Soft. Bowel sounds are normal. He exhibits no distension. There is no guarding.   Lymphadenopathy:     He has no cervical adenopathy.   Neurological: He is alert and oriented to person, place, and time. GCS eye subscore is 4. GCS verbal subscore is 5.   Alert, conversant. Ambulating without difficulty    Skin: Skin is dry. He is not diaphoretic. There is pallor.       Vents:  Vent Mode: A/C (12/12/18 0900)  Ventilator Initiated: Yes (12/09/18 0200)  Set Rate: 12 bmp (12/12/18 0900)  Vt Set: 0 mL (12/12/18 0900)  Pressure Support: 0 cmH20 (12/12/18 0900)  PEEP/CPAP: 10 cmH20 (12/12/18 0900)  Oxygen Concentration (%): 40 (12/13/18 0700)  Peak Airway Pressure: 31 cmH2O (12/12/18 0900)  Plateau Pressure: 28 cmH20 (12/12/18 0900)  Total Ve: 6.34 mL (12/12/18 0900)  F/VT Ratio<105 (RSBI): 111.94 (12/12/18 0807)  Lines/Drains/Airways     Peripheral Intravenous Line                 Peripheral IV - Single Lumen 12/14/18 2013 Left;Posterior Hand less than 1 day              Significant Labs:    CBC/Anemia Profile:  Recent Labs   Lab 12/14/18  0638 12/15/18  0347   WBC 8.63 11.14   HGB 11.9* 13.6*   HCT 36.9* 40.2   * 419*   MCV 95 91   RDW 12.9 13.5        Chemistries:  Recent Labs   Lab 12/14/18  0638 12/15/18  0443    136   K 3.7 4.6   CL 99 102   CO2 26 24   BUN 15 21*   CREATININE 0.6 0.7   CALCIUM 9.2 9.4   ALBUMIN 3.2* 3.1*   PROT 7.1 6.8   BILITOT 0.7 0.5   ALKPHOS 59 57   ALT 72* 77*   AST 30 30   MG 2.1 2.2   PHOS 2.9 3.8         Significant Imaging:  I have reviewed all pertinent imaging results/findings within the past 24 hours.

## 2018-12-15 NOTE — PLAN OF CARE
[facility] Medicine ICU Acceptance Note    Date of Admit: 12/9/2018  Date of Transfer / Stepdown: 12/15/2018  Dennis, C/J, L, Onc (IV chemo w/in 1 month), Gyn/Onc, or other special case?: no    ICU team stepping patient down: MICU   ICU team member giving verbal handoff:  Marlene Estrella NP  Accepting  team: KAMILAH COOPER    Brief History of Present Illness:      Ashwin Madrid is 37 yo M w PMHx significant for polysubstance abuse, bipolar disorder, ADHD, and GERD who was transferred to [facility] on 12/9  from [facility] for higher level of care due to ARDS and need for proning.       As patient was intubated on arrival, the majority of the history was collected from transfer paperwork and chart review. Patient presented to OSH with 3-day history of progressively worsening cough, fever and shortness of breath with associated decrease in PO intake and urine output. On admission, he denied IVDU and stated that he had abstained from his drug of choice (methamphetamine). On admission to OSH, he was noted to have leukocytosis with WBC of 21.4, fever of 102.7F, tachycardia of 127, tachypnea of 41 and hypoxemia with SpO2 of 84%. Imaging studies revealed RLL infiltrate concerning for community-acquired pneumonia.      Over the course of his 24 hours at OSH, his supplementary oxygen requirements steadily increased from nasal cannula to nonrebreather to BiPAP to ultimately intubation. Repeat CXR revealed development of bilateral infiltrates concerning for ARDS; BaK7-br-[id number] ratio was 54 and he was paralyzed with intermittent rocuronium.      Labs at OSH also revealed a transaminitis with AST of 186 and ALT of 48.     Hospital/ICU Course:     Transferred to MICU on 12/9 intubated, paralyzed, and sedated. Prone positioning for P:F 54. Supinated on 12/10 at 0930, P:F 200. Paralytic discontinued with severe agitation on fentanyl, versed, precedex, and dilaudid pushes. Low dose propofol used for a short  time, then changed to ketamine. Vent weaned according to ARDSnet protocol. Bipolar meds restarted on 12/11. Diuresed aggressively with electrolyte replacement. Versed discontinued on 12/11. Extubated to comfort flow on 12/12 and oxygen requirements continued to improve. He is now on room air. Intermittent agitation requiring precedex infusion and sitter. Psych consulted for management of bipolar medications and regimen restarted. He began to exhibit signs of possible benzo or alcohol withdrawal on 12/14 concern for symptoms of alcohol withdrawal so benzo taper started with improvement in symptoms.     Consultants and Procedures:     Consultants:  Psychiatry    Procedures:    intubation    Transfer Information:     Diet:   Regular diet     Physical Activity:  As tolerated    To Do / Pending Studies / Follow ups:  --signs and symptoms of withdrawal. May need prolonged benzo taper.   -- follow psychiatry recs     Patient has been accepted by Hospital Medicine Team KAMILAH COOPER, who will assume care of the patient upon arrival to the floor from the ICU. Please contact ICU team with any concerns prior to arrival. Please contact Hospital Medicine at [phone] or [phone] (please do NOT leave a voicemail) when patient arrives to the floor.    Wesley Negrete MD  Staff Hospitalist  Department of Hospital Medicine  [facility]   [phone]

## 2018-12-16 VITALS
SYSTOLIC BLOOD PRESSURE: 108 MMHG | OXYGEN SATURATION: 95 % | WEIGHT: 164 LBS | TEMPERATURE: 99 F | BODY MASS INDEX: 22.96 KG/M2 | DIASTOLIC BLOOD PRESSURE: 70 MMHG | HEART RATE: 107 BPM | RESPIRATION RATE: 15 BRPM | HEIGHT: 71 IN

## 2018-12-16 LAB
ALBUMIN SERPL BCP-MCNC: 3.3 G/DL
ALP SERPL-CCNC: 62 U/L
ALT SERPL W/O P-5'-P-CCNC: 73 U/L
ANION GAP SERPL CALC-SCNC: 9 MMOL/L
AST SERPL-CCNC: 34 U/L
BASOPHILS # BLD AUTO: 0.07 K/UL
BASOPHILS NFR BLD: 0.7 %
BILIRUB SERPL-MCNC: 0.4 MG/DL
BUN SERPL-MCNC: 17 MG/DL
CALCIUM SERPL-MCNC: 9.8 MG/DL
CHLORIDE SERPL-SCNC: 101 MMOL/L
CO2 SERPL-SCNC: 28 MMOL/L
CREAT SERPL-MCNC: 0.8 MG/DL
DIFFERENTIAL METHOD: ABNORMAL
EOSINOPHIL # BLD AUTO: 0.2 K/UL
EOSINOPHIL NFR BLD: 2 %
ERYTHROCYTE [DISTWIDTH] IN BLOOD BY AUTOMATED COUNT: 12.9 %
EST. GFR  (AFRICAN AMERICAN): >60 ML/MIN/1.73 M^2
EST. GFR  (NON AFRICAN AMERICAN): >60 ML/MIN/1.73 M^2
GLUCOSE SERPL-MCNC: 113 MG/DL
HCT VFR BLD AUTO: 38.2 %
HGB BLD-MCNC: 12.5 G/DL
IMM GRANULOCYTES # BLD AUTO: 0.45 K/UL
IMM GRANULOCYTES NFR BLD AUTO: 4.4 %
LYMPHOCYTES # BLD AUTO: 2.4 K/UL
LYMPHOCYTES NFR BLD: 23.5 %
MAGNESIUM SERPL-MCNC: 2.2 MG/DL
MCH RBC QN AUTO: 31.3 PG
MCHC RBC AUTO-ENTMCNC: 32.7 G/DL
MCV RBC AUTO: 96 FL
MONOCYTES # BLD AUTO: 1.1 K/UL
MONOCYTES NFR BLD: 10.4 %
NEUTROPHILS # BLD AUTO: 6 K/UL
NEUTROPHILS NFR BLD: 59 %
NRBC BLD-RTO: 0 /100 WBC
PHOSPHATE SERPL-MCNC: 3.5 MG/DL
PLATELET # BLD AUTO: 468 K/UL
PMV BLD AUTO: 8.7 FL
POTASSIUM SERPL-SCNC: 4.4 MMOL/L
PROT SERPL-MCNC: 7 G/DL
RBC # BLD AUTO: 4 M/UL
SODIUM SERPL-SCNC: 138 MMOL/L
WBC # BLD AUTO: 10.22 K/UL

## 2018-12-16 PROCEDURE — 25000003 PHARM REV CODE 250: Performed by: NURSE PRACTITIONER

## 2018-12-16 PROCEDURE — 93005 ELECTROCARDIOGRAM TRACING: CPT

## 2018-12-16 PROCEDURE — 85025 COMPLETE CBC W/AUTO DIFF WBC: CPT

## 2018-12-16 PROCEDURE — 83735 ASSAY OF MAGNESIUM: CPT

## 2018-12-16 PROCEDURE — 84100 ASSAY OF PHOSPHORUS: CPT

## 2018-12-16 PROCEDURE — 25000003 PHARM REV CODE 250: Performed by: HOSPITALIST

## 2018-12-16 PROCEDURE — 80053 COMPREHEN METABOLIC PANEL: CPT

## 2018-12-16 PROCEDURE — 93010 ELECTROCARDIOGRAM REPORT: CPT | Mod: ,,, | Performed by: INTERNAL MEDICINE

## 2018-12-16 PROCEDURE — S4991 NICOTINE PATCH NONLEGEND: HCPCS | Performed by: HOSPITALIST

## 2018-12-16 PROCEDURE — 25000003 PHARM REV CODE 250: Performed by: PHYSICIAN ASSISTANT

## 2018-12-16 PROCEDURE — 99238 HOSP IP/OBS DSCHRG MGMT 30/<: CPT | Mod: ,,, | Performed by: HOSPITALIST

## 2018-12-16 PROCEDURE — 36415 COLL VENOUS BLD VENIPUNCTURE: CPT

## 2018-12-16 RX ORDER — IPRATROPIUM BROMIDE AND ALBUTEROL SULFATE 2.5; .5 MG/3ML; MG/3ML
3 SOLUTION RESPIRATORY (INHALATION) EVERY 4 HOURS PRN
Status: DISCONTINUED | OUTPATIENT
Start: 2018-12-16 | End: 2018-12-16 | Stop reason: HOSPADM

## 2018-12-16 RX ORDER — POLYETHYLENE GLYCOL 3350 17 G/17G
17 POWDER, FOR SOLUTION ORAL DAILY
Status: DISCONTINUED | OUTPATIENT
Start: 2018-12-16 | End: 2018-12-16 | Stop reason: HOSPADM

## 2018-12-16 RX ORDER — ESCITALOPRAM OXALATE 10 MG/1
10 TABLET ORAL DAILY
Qty: 30 TABLET | Refills: 0 | Status: SHIPPED | OUTPATIENT
Start: 2018-12-17 | End: 2020-08-01 | Stop reason: CLARIF

## 2018-12-16 RX ORDER — SUMATRIPTAN 50 MG/1
25 TABLET, FILM COATED ORAL EVERY 8 HOURS PRN
Qty: 30 TABLET | Refills: 0 | Status: SHIPPED | OUTPATIENT
Start: 2018-12-16 | End: 2020-08-01 | Stop reason: CLARIF

## 2018-12-16 RX ORDER — IBUPROFEN 200 MG
16 TABLET ORAL
Status: DISCONTINUED | OUTPATIENT
Start: 2018-12-16 | End: 2018-12-16 | Stop reason: HOSPADM

## 2018-12-16 RX ORDER — FINASTERIDE 5 MG/1
5 TABLET, FILM COATED ORAL DAILY
Qty: 30 TABLET | Refills: 0 | Status: SHIPPED | OUTPATIENT
Start: 2018-12-16 | End: 2020-08-01 | Stop reason: CLARIF

## 2018-12-16 RX ORDER — MULTIVITAMIN
1 TABLET ORAL DAILY
Qty: 30 TABLET | Refills: 0 | Status: SHIPPED | OUTPATIENT
Start: 2018-12-16 | End: 2020-08-01 | Stop reason: CLARIF

## 2018-12-16 RX ORDER — LANOLIN ALCOHOL/MO/W.PET/CERES
100 CREAM (GRAM) TOPICAL DAILY
COMMUNITY
Start: 2018-12-17 | End: 2020-08-01 | Stop reason: CLARIF

## 2018-12-16 RX ORDER — NICOTINE 7MG/24HR
1 PATCH, TRANSDERMAL 24 HOURS TRANSDERMAL DAILY
Refills: 0 | COMMUNITY
Start: 2018-12-17 | End: 2020-08-01 | Stop reason: CLARIF

## 2018-12-16 RX ORDER — BISACODYL 10 MG
10 SUPPOSITORY, RECTAL RECTAL DAILY PRN
Status: DISCONTINUED | OUTPATIENT
Start: 2018-12-16 | End: 2018-12-16 | Stop reason: HOSPADM

## 2018-12-16 RX ORDER — GABAPENTIN 300 MG/1
900 CAPSULE ORAL 3 TIMES DAILY
Qty: 90 CAPSULE | Refills: 0 | Status: SHIPPED | OUTPATIENT
Start: 2018-12-16 | End: 2020-08-01 | Stop reason: CLARIF

## 2018-12-16 RX ORDER — OXCARBAZEPINE 300 MG/1
300 TABLET, FILM COATED ORAL 2 TIMES DAILY
Qty: 60 TABLET | Refills: 0 | Status: SHIPPED | OUTPATIENT
Start: 2018-12-16 | End: 2020-08-01 | Stop reason: CLARIF

## 2018-12-16 RX ORDER — IBUPROFEN 200 MG
24 TABLET ORAL
Status: DISCONTINUED | OUTPATIENT
Start: 2018-12-16 | End: 2018-12-16 | Stop reason: HOSPADM

## 2018-12-16 RX ORDER — BUPROPION HYDROCHLORIDE 450 MG/1
450 TABLET, FILM COATED, EXTENDED RELEASE ORAL DAILY
Qty: 30 TABLET | Refills: 0 | Status: SHIPPED | OUTPATIENT
Start: 2018-12-16 | End: 2020-08-01 | Stop reason: CLARIF

## 2018-12-16 RX ORDER — ZIPRASIDONE HYDROCHLORIDE 80 MG/1
80 CAPSULE ORAL 2 TIMES DAILY WITH MEALS
Qty: 60 CAPSULE | Refills: 0 | Status: SHIPPED | OUTPATIENT
Start: 2018-12-16 | End: 2020-08-01 | Stop reason: CLARIF

## 2018-12-16 RX ORDER — TALC
3 POWDER (GRAM) TOPICAL NIGHTLY
Qty: 30 TABLET | Refills: 0 | Status: SHIPPED | OUTPATIENT
Start: 2018-12-16 | End: 2020-08-01 | Stop reason: CLARIF

## 2018-12-16 RX ORDER — ACYCLOVIR 400 MG/1
400 TABLET ORAL 3 TIMES DAILY
Qty: 21 TABLET | Refills: 0 | Status: SHIPPED | OUTPATIENT
Start: 2018-12-16 | End: 2020-08-01 | Stop reason: CLARIF

## 2018-12-16 RX ORDER — FOLIC ACID 1 MG/1
1 TABLET ORAL DAILY
Qty: 90 TABLET | Refills: 3 | Status: SHIPPED | OUTPATIENT
Start: 2018-12-17 | End: 2020-08-01 | Stop reason: CLARIF

## 2018-12-16 RX ORDER — FERROUS SULFATE 324(65)MG
325 TABLET, DELAYED RELEASE (ENTERIC COATED) ORAL DAILY
Qty: 30 TABLET | Refills: 0 | Status: SHIPPED | OUTPATIENT
Start: 2018-12-16 | End: 2020-08-01 | Stop reason: CLARIF

## 2018-12-16 RX ORDER — ONDANSETRON 8 MG/1
8 TABLET, ORALLY DISINTEGRATING ORAL EVERY 8 HOURS PRN
Status: DISCONTINUED | OUTPATIENT
Start: 2018-12-16 | End: 2018-12-16 | Stop reason: HOSPADM

## 2018-12-16 RX ORDER — TOPIRAMATE 25 MG/1
25 TABLET ORAL 2 TIMES DAILY
Qty: 60 TABLET | Refills: 0 | Status: SHIPPED | OUTPATIENT
Start: 2018-12-16 | End: 2020-08-01 | Stop reason: CLARIF

## 2018-12-16 RX ORDER — ZIPRASIDONE HYDROCHLORIDE 80 MG/1
80 CAPSULE ORAL 2 TIMES DAILY WITH MEALS
Start: 2018-12-16 | End: 2018-12-16 | Stop reason: SDUPTHER

## 2018-12-16 RX ORDER — OMEPRAZOLE 20 MG/1
20 CAPSULE, DELAYED RELEASE ORAL EVERY MORNING
Qty: 30 CAPSULE | Refills: 0 | Status: SHIPPED | OUTPATIENT
Start: 2018-12-16 | End: 2020-08-01 | Stop reason: CLARIF

## 2018-12-16 RX ADMIN — FOLIC ACID 1 MG: 1 TABLET ORAL at 08:12

## 2018-12-16 RX ADMIN — ACETAMINOPHEN 650 MG: 325 TABLET, FILM COATED ORAL at 02:12

## 2018-12-16 RX ADMIN — GABAPENTIN 900 MG: 300 CAPSULE ORAL at 02:12

## 2018-12-16 RX ADMIN — ZIPRASIDONE HYDROCHLORIDE 80 MG: 40 CAPSULE ORAL at 08:12

## 2018-12-16 RX ADMIN — ESCITALOPRAM OXALATE 10 MG: 10 TABLET ORAL at 08:12

## 2018-12-16 RX ADMIN — POLYETHYLENE GLYCOL 3350 17 G: 17 POWDER, FOR SOLUTION ORAL at 08:12

## 2018-12-16 RX ADMIN — ACETAMINOPHEN 650 MG: 325 TABLET, FILM COATED ORAL at 08:12

## 2018-12-16 RX ADMIN — Medication 100 MG: at 08:12

## 2018-12-16 RX ADMIN — ONDANSETRON 8 MG: 8 TABLET, ORALLY DISINTEGRATING ORAL at 04:12

## 2018-12-16 RX ADMIN — OXCARBAZEPINE 300 MG: 300 TABLET, FILM COATED ORAL at 08:12

## 2018-12-16 RX ADMIN — ACETAMINOPHEN 650 MG: 325 TABLET, FILM COATED ORAL at 01:12

## 2018-12-16 RX ADMIN — DIAZEPAM 5 MG: 5 TABLET ORAL at 05:12

## 2018-12-16 RX ADMIN — GABAPENTIN 900 MG: 300 CAPSULE ORAL at 08:12

## 2018-12-16 RX ADMIN — BUPROPION HYDROCHLORIDE 450 MG: 150 TABLET, EXTENDED RELEASE ORAL at 08:12

## 2018-12-16 RX ADMIN — NICOTINE 1 PATCH: 7 PATCH, EXTENDED RELEASE TRANSDERMAL at 08:12

## 2018-12-16 NOTE — PLAN OF CARE
Problem: Adult Inpatient Plan of Care  Goal: Plan of Care Review  Outcome: Ongoing (interventions implemented as appropriate)   12/15/18 0923   Plan of Care Review   Plan of Care Reviewed With patient     Pt free of falls/trauma/injuries. Bed in low position, wheels locked, and call light within reach. Skin integrity remains unchanged. IV antibiotic given as ordered. Neck pain managed with heat packs and scheduled and prn medications. VSS and afebrile. No distress noted/reported at this time. Will continue to monitor.

## 2018-12-16 NOTE — PROGRESS NOTES
"[facility]  Psychiatry  Progress Note    Patient Name: Ashwin Madrid  MRN: [id number]   Code Status: Full Code  Admission Date: 12/9/2018  Hospital Length of Stay: 7 days  Expected Discharge Date:   Attending Physician: Wesley Vogt MD  Primary Care Provider: Primary Doctor No    Current Legal Status: N/A    Patient information was obtained from patient and past medical records.     Subjective:     Principal Problem:ARDS (adult respiratory distress syndrome)    Chief Complaint: Delerium    HPI:   Per Primary MD:  "Mr Madrid is a 37 yo M w PMHx significant for COPD (nil PFT), bipolar disorder, ADHD, polysubstance abuse and GERD who was transferred [facility] d/t concern for ARDS requiring proning.      As pt was intubated on arrival, the majority of the history was collected from transfer paperwork and chart review. Pt presented to OSH with 3-day history of progressively worsening cough, fever and shortness of breath with associated decrease in PO intake and urine output. On admission, he denied IVDU and stated that he had abstained from his drug of choice (methamphetamine). On admission to OSH, he was noted to have leukocytosis with WBC of 21.4, fever of 102.7F, tachycardia of 127, tachypnea of 41 and hypoxemia with SpO2 of 84%. Imaging studies revealed RLL infiltrate concerning for community-acquired pneumonia.      Over the course of his 24 hours at OSH, his supplementary oxygen requirements steadily increased from nasal cannula to nonrebreather to BiPAP to ultimately intubation. Repeat CXR revealed development of bilateral infiltrates concerning for ARDS; NhN6-jk-PmM1 ratio was 54 and he was paralyzed with intermittent rocuronium.      Labs at OSH also revealed a transaminitis with AST of 186 and ALT of 48."     Per Psychiatry MD:   Ashwin Madrid is a 38 y.o. male with a past psychiatric history of bipolar disorder (unspecified), ADHD, polysubstance abuse " (prescription opioids, heroin), currently presenting as a transfer from [facility] on 12/9 for management of ARDS.  Psychiatry was consulted to address the patient's symptoms of management of bipolar medications.     Since arrival, pt was extubated on 12/12/18.  RN reports pressured speech at that time, intermittent agitation.  On initial resident interview, pt answered questions for only a few minutes before declining any further questions.  I spoke with pt's sister, Amanda ([phone]) for most of the history.  She reports that pt was hospitalized at [facility] from 4/20 - 11/2018 for opioid/heroin abuse.  He was discharged in 11/2018 with plans to f/u locally at [place].  Sister states he was seen there at least once since discharge, but no medication changes, and she does not recall the name of a psychiatrist/psychologist there.       Collateral:   Amanda (sister) [phone]    SUBJECTIVE   Currently, the patient is endorsing the following:    Medical Review Of Systems:  Limited 2/2 patient participation  Constitutional: negative for fevers  Respiratory: positive for increased work of breathing  Behavioral/Psych: positive for agitation, confusion    Psychiatric Review Of Systems - Is patient experiencing or having changes in:  Limited 2/2 pt participation  sleep: yes  Pressured speech:  yes    Past Medical/Surgical History:  Past Medical History:   Diagnosis Date    ADHD (attention deficit hyperactivity disorder) 2/7/2012    Anxiety     Bipolar 1 disorder 2/7/2012    Depression     GERD (gastroesophageal reflux disease)       has a past medical history of ADHD (attention deficit hyperactivity disorder) (2/7/2012), Anxiety, Bipolar 1 disorder (2/7/2012), Depression, and GERD (gastroesophageal reflux disease).  Past Surgical History:   Procedure Laterality Date    EGD (ESOPHAGOGASTRODUODENOSCOPY) N/A 9/14/2012    Performed by Juancarlos So Jr., [facility] at [facility] ENDO     ESOPHAGOGASTRODUODENOSCOPY         Past Psychiatric History:  Previous Medication Trials: Yes - uncertain which medications  Previous Psychiatric Hospitalizations: Yes - [facility] April - November 2018  Previous Suicide Attempts: No  History of Violence: No  Outpatient Psychiatrist: Yes - In [place], uncertain name  Outpatient Psychotherapist: Yes - In [place], uncertain name    Social History:  Marital Status: not   Children: 0   Employment Status/Info: unemployed  Education: 10th grade  Special Ed: no  Housing/Lives with: mom, sister (Amanda)  History of phys/sexual abuse: No  Access to gun: No    Substance Abuse History:  Recreational Drugs: History of heroin, prescription opioids  Use of Alcohol: denied  Tobacco Use:yes 2 ppd    Functioning Relationships: good support system    Psychosocial Factors:  Peer group, social, ethic, cultural, emotional, and health factors: lives with family  Living situation, family constellation, family circumstances/home: lives with family, sister provides significant support    Hospital Course: 12/14/2018  Pt refuses a full interview.    12/15/2018  Pt is up walking around the room and agreeable to speak. He is linear and oriented. Pt endorses anxiety and requests Ativan. He is educated on Benzos not being optimal treatment selections for chronic anxiety. When asked about his Seroquel, pt reports he can't take Seroquel because it gives him nightmares. Pt is encouraged to utilize hydroxyzine if he becomes anxious. He denies SI/HI/AVH.    No new subjective & objective note has been filed under this hospital service since the last note was generated.    Assessment/Plan:     Alcohol abuse    1/2 pint per day  - queried on 12/14 negative for any recent benzo or opioid prescriptions  -Recommend cessation when appropriate     Opioid abuse    12/14: Pt states he relapsed with opioids prior to admission, unclear source.  Questionable historian.  - queried on 12/14  negative for any recent benzo or opioid prescriptions  -Recommend cessation when appropriate.     Bipolar disorder    Ashwin Madrid is a 38 y.o. male with a past psychiatric history of bipolar disorder (unspecified), ADHD, polysubstance abuse (prescription opioids, heroin), currently presenting as a transfer from [facility] on 12/9 for management of ARDS.  Psychiatry was consulted to address the patient's symptoms of management of bipolar medications.     Given complicated hospital course, waxing/waning symptoms, suspect a component of delirium in the setting of underlying bipolar disorder.     Not clear that current diaphoresis/anxiety is related to alcohol or benzodiazepine withdrawal given time since last drink and lack of improvement in objective findings (such as HR, BP) with doses of lorazepam on 12/14 AM.    Home medications on sheet from recent discharge from [place], and endorsed to be accurate per pt's sister:  Trileptal 300 BID  Melatonin 3 mg QHS  Elavil 100 mg QHS PRN  Gabapentin 900 TID   Lexapro 10 mg Qdaily  Topamax 25 mg BID  Wellbutrin  mg Qdaily  Geodon 80 at 1250 and 2000 daily    Recommendations  -Increase trileptal to 300 mg BID starting on 12/15 AM  -Ativan 2 mg Q4hrs PRN for at least two of the following: SBP>160, DBP>100, HR>110  -Seroquel 50 mg Q6hrs PRN for severe agitation  -Discontinue elavil PRN, haldol PRN.  -Ramelteon PRN  -EKG Qdaily  -Continue wellbutrin  Qdaily, lexapro 10 mg Qdaily, gabapentin 900 TID, and ziprasidone 80 mg BID.  -Ensure that geodon is given with food for proper absorption  - on opioid, alcohol cessation when appropriate.      Delirium Precautions  -Re-orient patient frequently and keep pt's whiteboard up to date with current day and team member's names  -Keep shades open/room lit during day  -Low light at night (close blinds at night)  -Low stimulation, minimize interruptions at night  -Minimize use of restraints  -Encourage  family to be at bedside  -Avoid opiates, benzodiazepines, antihistamines, anticholinergics, hypnotics as much as possible                Need for Continued Hospitalization:   No need for inpatient psychiatric hospitalization. Continue medical care as per the primary team.    Anticipated Disposition: ICU care    Total time:  25 with greater than 50% of this time spent in counseling and/or coordination of care.       Edwina Sweeney MD   Psychiatry  [facility]

## 2018-12-16 NOTE — PLAN OF CARE
Problem: Adult Inpatient Plan of Care  Goal: Plan of Care Review  Outcome: Ongoing (interventions implemented as appropriate)  No acute events throughout shift.  Vitals and assessment completed as ordered. Pt calm and cooperative. No complains of pain. Pt free from injury or falls

## 2018-12-16 NOTE — ASSESSMENT & PLAN NOTE
Ashwin Madrid is a 38 y.o. male with a past psychiatric history of bipolar disorder (unspecified), ADHD, polysubstance abuse (prescription opioids, heroin), currently presenting as a transfer from [facility] on 12/9 for management of ARDS.  Psychiatry was consulted to address the patient's symptoms of management of bipolar medications.     Given complicated hospital course, waxing/waning symptoms, suspect a component of delirium in the setting of underlying bipolar disorder.     Not clear that current diaphoresis/anxiety is related to alcohol or benzodiazepine withdrawal given time since last drink and lack of improvement in objective findings (such as HR, BP) with doses of lorazepam on 12/14 AM.    Home medications on sheet from recent discharge from [place], and endorsed to be accurate per pt's sister:  Trileptal 300 BID  Melatonin 3 mg QHS  Elavil 100 mg QHS PRN  Gabapentin 900 TID   Lexapro 10 mg Qdaily  Topamax 25 mg BID  Wellbutrin  mg Qdaily  Geodon 80 at 1250 and 2000 daily    Recommendations  -Increase trileptal to 300 mg BID starting on 12/15 AM  -Ativan 2 mg Q4hrs PRN for at least two of the following: SBP>160, DBP>100, HR>110  -Seroquel 50 mg Q6hrs PRN for severe agitation  -Discontinue elavil PRN, haldol PRN.  -Ramelteon PRN  -EKG Qdaily  -Continue wellbutrin  Qdaily, lexapro 10 mg Qdaily, gabapentin 900 TID, and ziprasidone 80 mg BID.  -Ensure that geodon is given with food for proper absorption  - on opioid, alcohol cessation when appropriate.      Delirium Precautions  -Re-orient patient frequently and keep pt's whiteboard up to date with current day and team member's names  -Keep shades open/room lit during day  -Low light at night (close blinds at night)  -Low stimulation, minimize interruptions at night  -Minimize use of restraints  -Encourage family to be at bedside  -Avoid opiates, benzodiazepines, antihistamines, anticholinergics, hypnotics as much as  possible

## 2018-12-16 NOTE — SUBJECTIVE & OBJECTIVE
"Interval History: Patient occasionally leaving room    Family History     None        Tobacco Use    Smoking status: Current Every Day Smoker     Packs/day: 1.00     Years: 10.00     Pack years: 10.00     Types: Cigarettes    Smokeless tobacco: Never Used   Substance and Sexual Activity    Alcohol use: No    Drug use: No     Comment: history of MJ at age 17, pain pills, mushrooms, Sober for last 1.5 years    Sexual activity: Yes     Partners: Female     Psychotherapeutics (From admission, onward)    Start     Stop Route Frequency Ordered    12/14/18 1734  ramelteon tablet 8 mg      -- Oral Nightly PRN 12/14/18 1635    12/14/18 1715  ziprasidone capsule 80 mg      -- Oral 2 times daily with meals 12/14/18 1304    12/14/18 1631  lorazepam injection 2 mg      -- IV Every 4 hours PRN 12/14/18 1632    12/13/18 0900  buPROPion TB24 tablet 450 mg      -- Oral Daily 12/12/18 1643    12/13/18 0900  escitalopram oxalate tablet 10 mg      -- Oral Daily 12/12/18 1643           Review of Systems   Constitutional: Negative for fatigue and fever.   Neurological: Negative for dizziness and facial asymmetry.   Psychiatric/Behavioral: Negative for agitation and behavioral problems.     Objective:     Vital Signs (Most Recent):  Temp: 98.5 °F (36.9 °C) (12/16/18 1156)  Pulse: 107 (12/16/18 0800)  Resp: 16 (12/16/18 0800)  BP: 99/71 (12/16/18 0800)  SpO2: 95 % (12/16/18 0800) Vital Signs (24h Range):  Temp:  [98 °F (36.7 °C)-98.9 °F (37.2 °C)] 98.5 °F (36.9 °C)  Pulse:  [104-109] 107  Resp:  [14-20] 16  SpO2:  [89 %-95 %] 95 %  BP: ()/(67-76) 99/71     Height: 5' 11" (180.3 cm)  Weight: 74.4 kg (164 lb)  Body mass index is 22.87 kg/m².      Intake/Output Summary (Last 24 hours) at 12/16/2018 1240  Last data filed at 12/16/2018 0500  Gross per 24 hour   Intake 960 ml   Output --   Net 960 ml       Physical Exam   Constitutional: He appears well-developed and well-nourished. No distress.   HENT:   Head: Normocephalic and " "atraumatic.   Skin: He is not diaphoretic.   Psychiatric:   Appearance: fair grooming, casually dressed, NAD, appears older than stated age  Behavior/Cooperation:  Calm, cooperative, pleasant, engaged  Language: fluent english  Speech: normal tone, normal rate, normal pitch, normal volume  Mood: "great"  Affect: full, reactive, appropriate  Thought Process: linear, logical, goal oriented  Thought Content:  Denies SI/HI/paranoia/delusions. No objective evidence of paranoia or delusions.  Perception: Denies AVH. No objective evidence of AVH   Orientation: oriented to person, place, year, month, situation, president  Memory: intact to conversation,  Attention Span/Concentration: Intact to conversation.l  Cognition: fair  Insight: fair  Judgment: fair            Significant Labs: All pertinent labs within the past 24 hours have been reviewed.    Significant Imaging: I have reviewed all pertinent imaging results/findings within the past 24 hours.  "

## 2018-12-16 NOTE — DISCHARGE INSTRUCTIONS
[facility] Outpatient Clinics  - [facility] MHC: [address] [place], [phone]  - [place] [facility]: [address] [facility], [phone]  - [facility]: [address], [place], [phone]  - [facility]: [address] [facility], [phone]  - [facility] ([facility]): [address], [phone]  - [facility] ([facility]): [address] [facility] Rosalina Menendez, [phone]  - Klaudia Purcell ([place]): [phone]  - [facility] [phone]     [facility]  - [facility]: [address] 4th floor, [phone]  - [facility]: [address] [facility] ([facility], 3rd Floor), [facility], [phone]  - [facility]: [address] [facility], [phone], [phone]  - [facility]: [address], [address], [phone]    Outpatient Group Therapy  - Cancer Support Group: [facility], [address] [facility], Vishnu Campos, [phone]  - Depression/Bipolar Support [place]: [facility], [address], [place], 7:30pm 1st & 3rd Tuesdays in cafeteria, [phone]  - Heart Transplant Support Group: [facility], 3rd Wednesday, 7th floor conference room, Emilee Tim, [phone]  - [facility] [place] [facility] ([facility]): [address] [place] [address], [place], [phone]  - [facility]: [address] [place] [address], Shannan Niraj, [phone]    Outpatient Drug Rehab   - [facility]: [address], 4th Floor, Suparossy Yeager, [phone]  - Alcoholics Anonymous: www.[facility].org, 24 Hr Hotline:326.323.4883, Main: [phone]  - [place] [facility]: [address], [phone], Tuesdays at 10am, Jaxson Chin (ext. [phone])  - [facility]: [address], [phone]  - [facility]: [address]  [facility], [place] Rosalina Menendez, [phone]  - [facility]: [address], [phone]    Inpatient Drug Rehab  - [facility]: [address] [facility], [phone]   - [facility]: [address] [facility], [phone]   - [facility]: [address], [place], [phone]  - [facility]: [phone]  - [facility] <females only> ([facility]): [address] [facility], [phone], ext [phone], Raiza Ramírez  - [place] <Fee based>: [address] [facility], [phone]    Elderly Services:  - Adult Protective Services: [phone]  - Bereavement Support Group, [facility],  & , [address] [facility], [phone],  Jonathan Mitchell, Ms Shankar, therapists  - Case Management for [facility]- [address] [facility], [place] [address] - Call [phone]   - [facility] on [address], [phone]  - [facility] -. [address], [place] [address] [phone] ext [phone]   - [facility]:. - [address] [facility], [address], [place] [address] - Call [phone]   - [place] on Aging: Information on Aging Services - [address], [address], [place] [address] - call [phone]     Alzheimers Services:   - [facility] : - [address], [place] [address] - Call [phone]   - Alzheimer's Adult care at the [facility] and [place], [place]. -  call [phone].  - Alzheimer's Services of [facility]: Provides current information on services available. Call [phone]     Mental Retardation Services:   - [place] [facility]- Call [phone]     Female Services:  - Battered Womens Hotline: [phone],   - [facility]attered Womens [facility]: [address] [address], [place], [address], Tele: [phone]  - Rape Hotline: 1-558.212.6496,      Child Outpatient Psychiatry  - [facility]: [address] [facility], [address], [phone]  - [facility]: [address], Dr. Guzman, [phone], [phone]    Placement/Shelters:    - [facility] Services: - [address], [place] [address] - Call [phone]   - [facility]  Medicaid funded, call [phone]   - [facility] Housing: family transitional housing, [address] [facility] [address], call for intake appt, [phone],   - [facility] Program, Outreach and Housing Placement, [address] [facility], [facility],  Sister Adrianna Amaro MSW, [phone]  - [facility]: [address] [facility], [phone]  - [facility] (16-24 year old): [address] [facility], [phone]  [facility] Center: [address]  [phone]  - [facility], [address] [facility]. [phone]  - [facility] Family Shelter (women only): [address], [phone]    HIV/AIDS Placement:  - AIDS Housing / Shelter: [facility] [address], [address], [place] [address] - call: [phone] or [phone]  - [facility], Call [phone]   - [facility] [phone]    Health Clinics / Dental Clinics / Indigent Pharmacy  - [facility]: [address] [facility], [phone]  - [facility]: [address] [facility], [phone], Mon-Thur 9am-4pm, Fri 9am-12pm  - [facility]: [address] [place] [address], [phone]  - [facility]: [address], [phone], fax [phone]  - [facility] (dental): [address] [place] [address]  [phone]  - [facility] ST[facility], [address] [facility], [phone]  - [facility] [place] (dental): [address], [place], [phone]  - [facility] (Pharmacy): [address], [address], ([facility]), [phone], Mon/Wed 8am-1pm    s Offices:  - [facility] s Office: Dr. Franco, [phone], [phone]  [facility] s Office: Dr Zachary Muir: [phone]    Crisis  - [facility] Hotline, [phone]  - National Suicide Prevention Crisis Hotline: 994.264.5174

## 2018-12-16 NOTE — PLAN OF CARE
Problem: Adult Inpatient Plan of Care  Goal: Plan of Care Review  Outcome: Outcome(s) achieved Date Met: 12/16/18 12/16/18 0240   Plan of Care Review   Plan of Care Reviewed With patient     Pt free of falls/trauma/injuries. Bed in low position, wheels locked, and call light within reach. Skin integrity remains unchanged. PIV removed and intact with gauze and co-band applied. Visi removed and Ipad returned. Discharge orders/instructions from AVS given and reviewed with pt, verbalized understanding. Pt requesting a month supply of medications and would like them sent to [facility]. Notified Dr. Jane of pt's request. No distress noted/reported at this time. Will continue to monitor while awaiting transport.

## 2018-12-16 NOTE — PROGRESS NOTES
"[facility]  Psychiatry  Progress Note    Patient Name: Ashwin Madrid  MRN: [id number]   Code Status: Full Code  Admission Date: 12/9/2018  Hospital Length of Stay: 7 days  Expected Discharge Date: 12/16/2018  Attending Physician: Wesley Vogt MD  Primary Care Provider: Primary Doctor No    Current Legal Status: N/A    Patient information was obtained from patient and ER records.     Subjective:     Principal Problem:ARDS (adult respiratory distress syndrome)    Chief Complaint: bipolar    HPI:   Per Primary MD:  "Mr Madrid is a 37 yo M w PMHx significant for COPD (nil PFT), bipolar disorder, ADHD, polysubstance abuse and GERD who was transferred to [facility] d/t concern for ARDS requiring proning.      As pt was intubated on arrival, the majority of the history was collected from transfer paperwork and chart review. Pt presented to OSH with 3-day history of progressively worsening cough, fever and shortness of breath with associated decrease in PO intake and urine output. On admission, he denied IVDU and stated that he had abstained from his drug of choice (methamphetamine). On admission to OSH, he was noted to have leukocytosis with WBC of 21.4, fever of 102.7F, tachycardia of 127, tachypnea of 41 and hypoxemia with SpO2 of 84%. Imaging studies revealed RLL infiltrate concerning for community-acquired pneumonia.      Over the course of his 24 hours at OSH, his supplementary oxygen requirements steadily increased from nasal cannula to nonrebreather to BiPAP to ultimately intubation. Repeat CXR revealed development of bilateral infiltrates concerning for ARDS; UuP3-cp-ArV8 ratio was 54 and he was paralyzed with intermittent rocuronium.      Labs at OSH also revealed a transaminitis with AST of 186 and ALT of 48."     Per Psychiatry MD:   Ashwin Madrid is a 38 y.o. male with a past psychiatric history of bipolar disorder (unspecified), ADHD, polysubstance abuse " (prescription opioids, heroin), currently presenting as a [facility] for management of ARDS.  Psychiatry was consulted to address the patient's symptoms of management of bipolar medications.     Since arrival, pt was extubated on 12/12/18.  RN reports pressured speech at that time, intermittent agitation.  On initial resident interview, pt answered questions for only a few minutes before declining any further questions.  I spoke with pt's sister, Amanda ([phone]) for most of the history.  She reports that pt was hospitalized at [facility] from [facility]/20 - 11/2018 for opioid/heroin abuse.  He was discharged in 11/2018 with plans to f/u locally at [place].  Sister states he was seen there at least once since discharge, but no medication changes, and she does not recall the name of a psychiatrist/psychologist there.       Collateral:   Amanda (sister) [phone]    SUBJECTIVE   Currently, the patient is endorsing the following:    Medical Review Of Systems:  Limited 2/2 patient participation  Constitutional: negative for fevers  Respiratory: positive for increased work of breathing  Behavioral/Psych: positive for agitation, confusion    Psychiatric Review Of Systems - Is patient experiencing or having changes in:  Limited 2/2 pt participation  sleep: yes  Pressured speech:  yes    Past Medical/Surgical History:  Past Medical History:   Diagnosis Date    ADHD (attention deficit hyperactivity disorder) 2/7/2012    Anxiety     Bipolar 1 disorder 2/7/2012    Depression     GERD (gastroesophageal reflux disease)       has a past medical history of ADHD (attention deficit hyperactivity disorder) (2/7/2012), Anxiety, Bipolar 1 disorder (2/7/2012), Depression, and GERD (gastroesophageal reflux disease).  Past Surgical History:   Procedure Laterality Date    EGD (ESOPHAGOGASTRODUODENOSCOPY) N/A 9/14/2012    Performed by Juancarlos So Jr., [facility] at [facility] ENDO     ESOPHAGOGASTRODUODENOSCOPY         Past Psychiatric History:  Previous Medication Trials: Yes - uncertain which medications  Previous Psychiatric Hospitalizations: Yes - [facility] April - November 2018  Previous Suicide Attempts: No  History of Violence: No  Outpatient Psychiatrist: Yes - In [place], uncertain name  Outpatient Psychotherapist: Yes - In [place], uncertain name    Social History:  Marital Status: not   Children: 0   Employment Status/Info: unemployed  Education: 10th grade  Special Ed: no  Housing/Lives with: mom, sister (Amanda)  History of phys/sexual abuse: No  Access to gun: No    Substance Abuse History:  Recreational Drugs: History of heroin, prescription opioids  Use of Alcohol: denied  Tobacco Use:yes 2 ppd    Functioning Relationships: good support system    Psychosocial Factors:  Peer group, social, ethic, cultural, emotional, and health factors: lives with family  Living situation, family constellation, family circumstances/home: lives with family, sister provides significant support    Hospital Course: 12/14/2018  Pt refuses a full interview.    12/15/2018  Pt is up walking around the room and agreeable to speak. He is linear and oriented. Pt endorses anxiety and requests Ativan. He is educated on Benzos not being optimal treatment selections for chronic anxiety. When asked about his Seroquel, pt reports he can't take Seroquel because it gives him nightmares. Pt is encouraged to utilize hydroxyzine if he becomes anxious. He denies SI/HI/AVH.    12/16/2018  Patient is calm, cooperative and agreeable to speak. He is alert and oriented with linear TP. He states he is doing much better and has no complaints. Denies SI/HI/AVH.    Interval History: Patient occasionally leaving room    Family History     None        Tobacco Use    Smoking status: Current Every Day Smoker     Packs/day: 1.00     Years: 10.00     Pack years: 10.00     Types: Cigarettes    Smokeless tobacco: Never  "Used   Substance and Sexual Activity    Alcohol use: No    Drug use: No     Comment: history of MJ at age 17, pain pills, mushrooms, Sober for last 1.5 years    Sexual activity: Yes     Partners: Female     Psychotherapeutics (From admission, onward)    Start     Stop Route Frequency Ordered    12/14/18 1734  ramelteon tablet 8 mg      -- Oral Nightly PRN 12/14/18 1635    12/14/18 1715  ziprasidone capsule 80 mg      -- Oral 2 times daily with meals 12/14/18 1304    12/14/18 1631  lorazepam injection 2 mg      -- IV Every 4 hours PRN 12/14/18 1632    12/13/18 0900  buPROPion TB24 tablet 450 mg      -- Oral Daily 12/12/18 1643    12/13/18 0900  escitalopram oxalate tablet 10 mg      -- Oral Daily 12/12/18 1643           Review of Systems   Constitutional: Negative for fatigue and fever.   Neurological: Negative for dizziness and facial asymmetry.   Psychiatric/Behavioral: Negative for agitation and behavioral problems.     Objective:     Vital Signs (Most Recent):  Temp: 98.5 °F (36.9 °C) (12/16/18 1156)  Pulse: 107 (12/16/18 0800)  Resp: 16 (12/16/18 0800)  BP: 99/71 (12/16/18 0800)  SpO2: 95 % (12/16/18 0800) Vital Signs (24h Range):  Temp:  [98 °F (36.7 °C)-98.9 °F (37.2 °C)] 98.5 °F (36.9 °C)  Pulse:  [104-109] 107  Resp:  [14-20] 16  SpO2:  [89 %-95 %] 95 %  BP: ()/(67-76) 99/71     Height: 5' 11" (180.3 cm)  Weight: 74.4 kg (164 lb)  Body mass index is 22.87 kg/m².      Intake/Output Summary (Last 24 hours) at 12/16/2018 1240  Last data filed at 12/16/2018 0500  Gross per 24 hour   Intake 960 ml   Output --   Net 960 ml       Physical Exam   Constitutional: He appears well-developed and well-nourished. No distress.   HENT:   Head: Normocephalic and atraumatic.   Skin: He is not diaphoretic.   Psychiatric:   Appearance: fair grooming, casually dressed, NAD, appears older than stated age  Behavior/Cooperation:  Calm, cooperative, pleasant, engaged  Language: fluent english  Speech: normal tone, normal " "rate, normal pitch, normal volume  Mood: "great"  Affect: full, reactive, appropriate  Thought Process: linear, logical, goal oriented  Thought Content:  Denies SI/HI/paranoia/delusions. No objective evidence of paranoia or delusions.  Perception: Denies AVH. No objective evidence of AVH   Orientation: oriented to person, place, year, month, situation, president  Memory: intact to conversation,  Attention Span/Concentration: Intact to conversation.l  Cognition: fair  Insight: fair  Judgment: fair            Significant Labs: All pertinent labs within the past 24 hours have been reviewed.    Significant Imaging: I have reviewed all pertinent imaging results/findings within the past 24 hours.    Assessment/Plan:     Alcohol abuse    1/2 pint per day  - queried on 12/14 negative for any recent benzo or opioid prescriptions  -Recommend cessation when appropriate     Opioid abuse    12/14: Pt states he relapsed with opioids prior to admission, unclear source.  Questionable historian.  - queried on 12/14 negative for any recent benzo or opioid prescriptions  -Recommend cessation when appropriate.     Bipolar disorder    Ashwin Madrid is a 38 y.o. male with a past psychiatric history of bipolar disorder (unspecified), ADHD, polysubstance abuse (prescription opioids, heroin), currently presenting [facility] for management of ARDS.  Psychiatry was consulted to address the patient's symptoms of management of bipolar medications.     Given complicated hospital course, waxing/waning symptoms, suspect a component of delirium in the setting of underlying bipolar disorder.     Not clear that current diaphoresis/anxiety is related to alcohol or benzodiazepine withdrawal given time since last drink and lack of improvement in objective findings (such as HR, BP) with doses of lorazepam on 12/14 AM.    Home medications on sheet from recent discharge from [facility], and endorsed to be " accurate per pt's sister:  Trileptal 300 BID  Melatonin 3 mg QHS  Elavil 100 mg QHS PRN  Gabapentin 900 TID   Lexapro 10 mg Qdaily  Topamax 25 mg BID  Wellbutrin  mg Qdaily  Geodon 80 at 1250 and 2000 daily    Recommendations  -Increase trileptal to 300 mg BID starting on 12/15 AM  -Ativan 2 mg Q4hrs PRN for at least two of the following: SBP>160, DBP>100, HR>110  -Seroquel 50 mg Q6hrs PRN for severe agitation  -Discontinue elavil PRN, haldol PRN.  -Ramelteon PRN  -EKG Qdaily  -Continue wellbutrin  Qdaily, lexapro 10 mg Qdaily, gabapentin 900 TID, and ziprasidone 80 mg BID.  -Ensure that geodon is given with food for proper absorption  - on opioid, alcohol cessation when appropriate.      Delirium Precautions  -Re-orient patient frequently and keep pt's whiteboard up to date with current day and team member's names  -Keep shades open/room lit during day  -Low light at night (close blinds at night)  -Low stimulation, minimize interruptions at night  -Minimize use of restraints  -Encourage family to be at bedside  -Avoid opiates, benzodiazepines, antihistamines, anticholinergics, hypnotics as much as possible                Total time:  15 with greater than 50% of this time spent in counseling and/or coordination of care.       Edwina Sweeney MD   Psychiatry  [facility]

## 2018-12-16 NOTE — NURSING
Pt has been ambulating in and out of his room since 1230 am,asking for everything  Medicine he sees on his IPAD ,pt. Often informed to tell this nurse when h leaving the unit.,on going monitoring.

## 2018-12-17 NOTE — DISCHARGE SUMMARY
Discharge Summary  Hospital Medicine    Attending Provider on Discharge: Wesley Vogt MD  Hospital Medicine Team: [facility] MED R  Date of Admission:  12/9/2018     Date of Discharge:  12/16/2018  Code status: Full Code    Active Hospital Problems    Diagnosis  POA    *ARDS (adult respiratory distress syndrome) [J80]  Yes    Opioid abuse [F11.10]  Yes    Alcohol abuse [F10.10]  Yes    Hepatitis C antibody positive in blood [R76.8]  Yes    Transaminitis [R74.0]  Yes    Hypertriglyceridemia [E78.1]  Yes    Bipolar disorder [F31.9]  Yes      Resolved Hospital Problems    Diagnosis Date Resolved POA    Acute metabolic encephalopathy [G93.41] 12/15/2018 Yes    Acute hypoxemic respiratory failure [J96.01] 12/15/2018 Yes    Pneumonia [J18.9] 12/15/2018 Yes        HPI    39 yo M w PMHx significant for polysubstance abuse, bipolar disorder, ADHD, and GERD who was transferred to [facility] on 12/9  from [facility] for higher level of care due to ARDS and need for proning.       As patient was intubated on arrival, the majority of the history was collected from transfer paperwork and chart review. Patient presented to OSH with 3-day history of progressively worsening cough, fever and shortness of breath with associated decrease in PO intake and urine output. On admission, he denied IVDU and stated that he had abstained from his drug of choice (methamphetamine). On admission to OSH, he was noted to have leukocytosis with WBC of 21.4, fever of 102.7F, tachycardia of 127, tachypnea of 41 and hypoxemia with SpO2 of 84%. Imaging studies revealed RLL infiltrate concerning for community-acquired pneumonia.      Over the course of his 24 hours at OSH, his supplementary oxygen requirements steadily increased from nasal cannula to nonrebreather to BiPAP to ultimately intubation. Repeat CXR revealed development of bilateral infiltrates concerning for ARDS; DaC6-lp-QbL2 ratio was 54 and he was paralyzed  with intermittent rocuronium.      Labs at OSH also revealed a transaminitis with AST of 186 and ALT of 48.       Hospital Course    Transferred to MICU on 12/9 intubated, paralyzed, and sedated. Prone positioning for P:F 54. Supinated on 12/10 at 0930, P:F 200. Paralytic discontinued with severe agitation on fentanyl, versed, precedex, and dilaudid pushes. Low dose propofol used for a short time, then changed to ketamine. Vent weaned according to ARDSnet protocol. Bipolar meds restarted on 12/11. Diuresed aggressively with electrolyte replacement. Versed discontinued on 12/11. Extubated to comfort flow on 12/12 and oxygen requirements continued to improve. He is now on room air. Intermittent agitation requiring precedex infusion and sitter. Psych consulted for management of bipolar medications and regimen restarted. He began to exhibit signs of possible benzo or alcohol withdrawal on 12/14 concern for symptoms of alcohol withdrawal so benzo taper started with improvement in symptoms. Transferred to floor on 12/15, vitals signs stable, finished course of tapering benzos as well as IV ceftriaxone for Community acquired pneumonia. Discharged home with resumption of home meds and instructed to follow up with a psychiatrist for addiction treatment. Resources given.          Recent Labs   Lab 12/14/18  0638 12/15/18  0347 12/16/18  0536   WBC 8.63 11.14 10.22   HGB 11.9* 13.6* 12.5*   HCT 36.9* 40.2 38.2*   * 419* 468*     Recent Labs   Lab 12/14/18  0638 12/15/18  0443 12/16/18  0535    136 138   K 3.7 4.6 4.4   CL 99 102 101   CO2 26 24 28   BUN 15 21* 17   CREATININE 0.6 0.7 0.8   GLU 86 135* 113*   CALCIUM 9.2 9.4 9.8   MG 2.1 2.2 2.2   PHOS 2.9 3.8 3.5     Recent Labs   Lab 12/14/18  0638 12/15/18  0443 12/16/18  0535   ALKPHOS 59 57 62   ALT 72* 77* 73*   AST 30 30 34   ALBUMIN 3.2* 3.1* 3.3*   PROT 7.1 6.8 7.0   BILITOT 0.7 0.5 0.4      Recent Labs   Lab 12/11/18  2349 12/12/18  0506 12/12/18  1249  12/12/18  1750 12/13/18  0020 12/13/18  1157   POCTGLUCOSE 136* 125* 112* 106 84 89     No results for input(s): CPK, CPKMB, MB, TROPONINI in the last 72 hours.    No results for input(s): LACTATE in the last 72 hours.     Procedures: intubation     Consultants: Psychiatry, Critical care for intubation    Discharge Medication List as of 12/16/2018  1:57 PM      START taking these medications    Details   folic acid (FOLVITE) 1 MG tablet Take 1 tablet (1 mg total) by mouth once daily., Starting Mon 12/17/2018, Until Tue 12/17/2019, Normal      nicotine (NICODERM CQ) 7 mg/24 hr Place 1 patch onto the skin once daily., Starting Mon 12/17/2018, OTC      thiamine 100 MG tablet Take 1 tablet (100 mg total) by mouth once daily., Starting Mon 12/17/2018, OTC         CONTINUE these medications which have CHANGED    Details   ziprasidone (GEODON) 80 MG capsule Take 1 capsule (80 mg total) by mouth 2 (two) times daily with meals. Per [facility] Discharge/Continuing care plan. Take at 12:50 and 8pm   To be given with food for proper absorption, Starting Sun 12/16/2018, No Print         CONTINUE these medications which have NOT CHANGED    Details   cyanocobalamin (VITAMIN B-12) 1000 MCG tablet Take 5,000 mcg by mouth once daily., Historical Med      cyclobenzaprine (FLEXERIL) 10 MG tablet Take 10 mg by mouth 2 (two) times daily. , Starting Sat 12/2/2017, Historical Med      buPROPion (WELLBUTRIN XL) 300 MG 24 hr tablet Take 450 mg by mouth once daily. , Starting Tue 1/30/2018, Historical Med      escitalopram oxalate (LEXAPRO) 10 MG tablet Take 10 mg by mouth once daily., Historical Med      ferrous sulfate 324 mg (65 mg iron) TbEC Take 325 mg by mouth once daily., Historical Med      finasteride (PROSCAR) 5 mg tablet Take 5 mg by mouth once daily., Historical Med      gabapentin (NEURONTIN) 300 MG capsule Take 900 mg by mouth 3 (three) times daily., Historical Med      melatonin 3 mg Tab Take 3 mg by mouth nightly.,  Historical Med      multivitamin (THERAGRAN) per tablet Take 1 tablet by mouth once daily., Historical Med      omeprazole (PRILOSEC) 20 MG capsule Take 20 mg by mouth every morning., Historical Med      OXcarbazepine (TRILEPTAL) 600 MG Tab 300 mg 2 (two) times daily. , Starting Tue 1/30/2018, Historical Med      sumatriptan (IMITREX) 50 MG tablet Take 50 mg by mouth every 2 (two) hours as needed for Migraine (May repeat dose in 2 hours)., Historical Med      topiramate (TOPAMAX) 25 MG tablet Take 25 mg by mouth 2 (two) times daily., Historical Med         STOP taking these medications       acyclovir (ZOVIRAX) 400 MG tablet Comments:   Reason for Stopping:         amitriptyline (ELAVIL) 100 MG tablet Comments:   Reason for Stopping:         sulfamethoxazole-trimethoprim 800-160mg (BACTRIM DS) 800-160 mg Tab Comments:   Reason for Stopping:         tamsulosin (FLOMAX) 0.4 mg Cap Comments:   Reason for Stopping:               Discharge Diet:regular diet with Normal Fluid intake of 1500 - 2000 mL per day    Activity: activity as tolerated    Discharge Condition: Stable    Disposition: Home or Self Care    Tests pending at the time of discharge: none       Time spent  on the discharge of the patient including review of hospital course with the patient. reviewing discharge medications and arranging follow-up care 35 minutes     Discharge examination Patient was seen and examined on the date of discharge and determined to be suitable for discharge.    Discharge plan and follow up:  Follow up with Addiction psychiatry    No future appointments.    Provider  Wesley Negrete MD  Staff Hospitalist  Department of Hospital Medicine  [facility]   [phone]

## 2018-12-19 NOTE — PT/OT/SLP DISCHARGE
Physical Therapy Discharge Summary    Name: Ashwin Madrid  MRN: [id number]   Principal Problem: ARDS (adult respiratory distress syndrome)     Patient Discharged from acute Physical Therapy on 12/19/18.  Please refer to prior PT noted date on 12/14/18 for functional status.     Assessment:     Patient was discharged unexpectedly.  Information required to complete an accurate discharge summary is unknown.  Refer to therapy initial evaluation and last progress note for initial and most recent functional status and goal achievement.  Recommendations made may be found in medical record.    Objective:     GOALS:   Multidisciplinary Problems     Physical Therapy Goals        Problem: Physical Therapy Goal    Goal Priority Disciplines Outcome Goal Variances Interventions   Physical Therapy Goal     PT, PT/OT Ongoing (interventions implemented as appropriate)     Description:  Goals to be met by: 12/23/2018    Patient will increase functional independence with mobility by performing:    Supine <> sit with [place].  Sit <> stand transfer with [place] using No Assistive Device.  Bed <> chair transfer via Stand Pivot with [place] using No Assistive Device.  Gait  x 300 feet with Modified [place] using No Assistive Device to prepare for community ambulation and endurance activities.                        Reasons for Discontinuation of Therapy Services  Transfer to alternate level of care.      Plan:     Patient Discharged to: Home no PT services needed.    Lisa López, PT  12/19/2018

## 2019-11-08 ENCOUNTER — HOSPITAL ENCOUNTER (EMERGENCY)
Facility: HOSPITAL | Age: 39
Discharge: PSYCHIATRIC OR SUBSTANCE USE FACILITY | End: 2019-11-08
Attending: EMERGENCY MEDICINE
Payer: MEDICARE

## 2019-11-08 ENCOUNTER — DOCUMENTATION ONLY (OUTPATIENT)
Dept: EMERGENCY MEDICINE | Facility: HOSPITAL | Age: 39
End: 2019-11-08

## 2019-11-08 VITALS
RESPIRATION RATE: 18 BRPM | OXYGEN SATURATION: 97 % | HEART RATE: 99 BPM | DIASTOLIC BLOOD PRESSURE: 84 MMHG | TEMPERATURE: 99 F | SYSTOLIC BLOOD PRESSURE: 143 MMHG

## 2019-11-08 DIAGNOSIS — F31.9 BIPOLAR AFFECTIVE DISORDER, REMISSION STATUS UNSPECIFIED: Primary | ICD-10-CM

## 2019-11-08 DIAGNOSIS — F29 PSYCHOSIS, UNSPECIFIED PSYCHOSIS TYPE: ICD-10-CM

## 2019-11-08 LAB
ALBUMIN SERPL BCP-MCNC: 4.2 G/DL (ref 3.5–5.2)
ALP SERPL-CCNC: 67 U/L (ref 55–135)
ALT SERPL W/O P-5'-P-CCNC: 26 U/L (ref 10–44)
AMPHET+METHAMPHET UR QL: NEGATIVE
ANION GAP SERPL CALC-SCNC: 11 MMOL/L (ref 8–16)
APAP SERPL-MCNC: 5 UG/ML (ref 10–20)
AST SERPL-CCNC: 41 U/L (ref 10–40)
BARBITURATES UR QL SCN>200 NG/ML: NEGATIVE
BASOPHILS # BLD AUTO: 0.06 K/UL (ref 0–0.2)
BASOPHILS NFR BLD: 0.5 % (ref 0–1.9)
BENZODIAZ UR QL SCN>200 NG/ML: NEGATIVE
BILIRUB SERPL-MCNC: 0.3 MG/DL (ref 0.1–1)
BILIRUB UR QL STRIP: NEGATIVE
BUN SERPL-MCNC: 13 MG/DL (ref 6–20)
BZE UR QL SCN: NEGATIVE
CALCIUM SERPL-MCNC: 9.7 MG/DL (ref 8.7–10.5)
CANNABINOIDS UR QL SCN: NEGATIVE
CHLORIDE SERPL-SCNC: 106 MMOL/L (ref 95–110)
CLARITY UR: CLEAR
CO2 SERPL-SCNC: 25 MMOL/L (ref 23–29)
COLOR UR: YELLOW
CREAT SERPL-MCNC: 0.9 MG/DL (ref 0.5–1.4)
CREAT UR-MCNC: 119.3 MG/DL (ref 23–375)
DIFFERENTIAL METHOD: ABNORMAL
EOSINOPHIL # BLD AUTO: 0.2 K/UL (ref 0–0.5)
EOSINOPHIL NFR BLD: 1.8 % (ref 0–8)
ERYTHROCYTE [DISTWIDTH] IN BLOOD BY AUTOMATED COUNT: 12.9 % (ref 11.5–14.5)
EST. GFR  (AFRICAN AMERICAN): >60 ML/MIN/1.73 M^2
EST. GFR  (NON AFRICAN AMERICAN): >60 ML/MIN/1.73 M^2
ETHANOL SERPL-MCNC: <10 MG/DL
GLUCOSE SERPL-MCNC: 102 MG/DL (ref 70–110)
GLUCOSE UR QL STRIP: NEGATIVE
HCT VFR BLD AUTO: 39.7 % (ref 40–54)
HGB BLD-MCNC: 13.7 G/DL (ref 14–18)
HGB UR QL STRIP: NEGATIVE
IMM GRANULOCYTES # BLD AUTO: 0.12 K/UL (ref 0–0.04)
IMM GRANULOCYTES NFR BLD AUTO: 0.9 % (ref 0–0.5)
KETONES UR QL STRIP: NEGATIVE
LEUKOCYTE ESTERASE UR QL STRIP: NEGATIVE
LYMPHOCYTES # BLD AUTO: 2.9 K/UL (ref 1–4.8)
LYMPHOCYTES NFR BLD: 21.9 % (ref 18–48)
MCH RBC QN AUTO: 31.6 PG (ref 27–31)
MCHC RBC AUTO-ENTMCNC: 34.5 G/DL (ref 32–36)
MCV RBC AUTO: 92 FL (ref 82–98)
METHADONE UR QL SCN>300 NG/ML: NEGATIVE
MONOCYTES # BLD AUTO: 1.2 K/UL (ref 0.3–1)
MONOCYTES NFR BLD: 9 % (ref 4–15)
NEUTROPHILS # BLD AUTO: 8.7 K/UL (ref 1.8–7.7)
NEUTROPHILS NFR BLD: 65.9 % (ref 38–73)
NITRITE UR QL STRIP: NEGATIVE
NRBC BLD-RTO: 0 /100 WBC
OPIATES UR QL SCN: NEGATIVE
PCP UR QL SCN>25 NG/ML: NEGATIVE
PH UR STRIP: 7 [PH] (ref 5–8)
PLATELET # BLD AUTO: 418 K/UL (ref 150–350)
PMV BLD AUTO: 8.8 FL (ref 9.2–12.9)
POTASSIUM SERPL-SCNC: 4 MMOL/L (ref 3.5–5.1)
PROT SERPL-MCNC: 7.1 G/DL (ref 6–8.4)
PROT UR QL STRIP: NEGATIVE
RBC # BLD AUTO: 4.34 M/UL (ref 4.6–6.2)
SALICYLATES SERPL-MCNC: <5 MG/DL (ref 15–30)
SODIUM SERPL-SCNC: 142 MMOL/L (ref 136–145)
SP GR UR STRIP: 1.01 (ref 1–1.03)
T4 FREE SERPL-MCNC: 1.17 NG/DL (ref 0.71–1.51)
TOXICOLOGY INFORMATION: NORMAL
TSH SERPL DL<=0.005 MIU/L-ACNC: 0.34 UIU/ML (ref 0.4–4)
URN SPEC COLLECT METH UR: NORMAL
UROBILINOGEN UR STRIP-ACNC: NEGATIVE EU/DL
WBC # BLD AUTO: 13.18 K/UL (ref 3.9–12.7)

## 2019-11-08 PROCEDURE — 63600175 PHARM REV CODE 636 W HCPCS: Performed by: EMERGENCY MEDICINE

## 2019-11-08 PROCEDURE — 36415 COLL VENOUS BLD VENIPUNCTURE: CPT

## 2019-11-08 PROCEDURE — 80320 DRUG SCREEN QUANTALCOHOLS: CPT

## 2019-11-08 PROCEDURE — 80307 DRUG TEST PRSMV CHEM ANLYZR: CPT

## 2019-11-08 PROCEDURE — 80329 ANALGESICS NON-OPIOID 1 OR 2: CPT

## 2019-11-08 PROCEDURE — 25000003 PHARM REV CODE 250: Performed by: EMERGENCY MEDICINE

## 2019-11-08 PROCEDURE — 96372 THER/PROPH/DIAG INJ SC/IM: CPT

## 2019-11-08 PROCEDURE — 99285 EMERGENCY DEPT VISIT HI MDM: CPT | Mod: 25

## 2019-11-08 PROCEDURE — S4991 NICOTINE PATCH NONLEGEND: HCPCS | Performed by: EMERGENCY MEDICINE

## 2019-11-08 PROCEDURE — 84439 ASSAY OF FREE THYROXINE: CPT

## 2019-11-08 PROCEDURE — 84443 ASSAY THYROID STIM HORMONE: CPT

## 2019-11-08 PROCEDURE — 85025 COMPLETE CBC W/AUTO DIFF WBC: CPT

## 2019-11-08 PROCEDURE — 80053 COMPREHEN METABOLIC PANEL: CPT

## 2019-11-08 PROCEDURE — 81003 URINALYSIS AUTO W/O SCOPE: CPT | Mod: 59

## 2019-11-08 RX ORDER — LORAZEPAM 2 MG/ML
1 INJECTION INTRAMUSCULAR
Status: COMPLETED | OUTPATIENT
Start: 2019-11-08 | End: 2019-11-08

## 2019-11-08 RX ORDER — IBUPROFEN 200 MG
1 TABLET ORAL
Status: DISCONTINUED | OUTPATIENT
Start: 2019-11-08 | End: 2019-11-08 | Stop reason: HOSPADM

## 2019-11-08 RX ORDER — HALOPERIDOL 5 MG/ML
5 INJECTION INTRAMUSCULAR
Status: COMPLETED | OUTPATIENT
Start: 2019-11-08 | End: 2019-11-08

## 2019-11-08 RX ADMIN — NICOTINE 1 PATCH: 21 PATCH TRANSDERMAL at 02:11

## 2019-11-08 RX ADMIN — LORAZEPAM 1 MG: 2 INJECTION INTRAMUSCULAR; INTRAVENOUS at 01:11

## 2019-11-08 RX ADMIN — HALOPERIDOL LACTATE 5 MG: 5 INJECTION INTRAMUSCULAR at 02:11

## 2019-11-08 NOTE — ED NOTES
Pt resting in ER psych stretcher with eyes closed, rr e/u, NAD noted. Pt remains in yellow socks and underwear. Bed low and locked. SUMAN Braden at  performing direct observation at this time. Will continue to monitor.

## 2019-11-08 NOTE — ED NOTES
T/c to pt's sister, Kary Madrid at [phone].  Informed her that the pt will be transported and admitted to [facility].

## 2019-11-08 NOTE — ED NOTES
"Pt was comenting to another patient walking by saying, "girl where you going looking so ugly."  Pt is also pacing and jogging around the room  Pt states he needs to be able to move around freely so he can not wear the gown.  "

## 2019-11-08 NOTE — ED NOTES
Pt resting in ER psych stretcher with eyes closed, rr e/u, NAD noted. Pt remains in grey gown with yellow socks per protocol. Bed low and locked. SUMAN Faulkner at  performing direct observation at this time.  Will continue to monitor.

## 2019-11-08 NOTE — ED PROVIDER NOTES
SCRIBE #1 NOTE: I, Дмитрий Louie, am scribing for, and in the presence of, Irving Loza MD. I have scribed the entire note.       History     Chief Complaint   Patient presents with    PEC     pt sent over from [place] with PEC for aggressive behavior, pt making threats.  history of bipolar     Review of patient's allergies indicates:  No Known Allergies      History of Present Illness     HPI    11/8/2019, 1:36 PM  History obtained from the patient      History of Present Illness: Ashwin Madrid is a 39 y.o. male patient with a PMHx of anxiety, bipolar, depression, and GERD who presents to the Emergency Department for psychiatric evaluation. Pt was sent from [place] with PEC from Dr. Nova Fuentes for angry mood, positive delusion, and impaired. Symptoms are constant and moderate in severity. No mitigating or exacerbating factors reported. Pt denies any other complaints at this time. Patient denies any suicidal ideations, homicidal ideations, recent increased stress, sleep changes, alcohol use, IV drug use, and all other sxs at this time. No further complaints or concerns at this time.         Arrival mode   [facility]I    PCP: Primary Doctor No        Past Medical History:  Past Medical History:   Diagnosis Date    ADHD (attention deficit hyperactivity disorder) 2/7/2012    Anxiety     Bipolar 1 disorder 2/7/2012    Depression     GERD (gastroesophageal reflux disease)        Past Surgical History:  Past Surgical History:   Procedure Laterality Date    ESOPHAGOGASTRODUODENOSCOPY           Family History:  Family History   Problem Relation Age of Onset    ADD / ADHD Neg Hx     Alcohol abuse Neg Hx     Anxiety disorder Neg Hx     Bipolar disorder Neg Hx     Dementia Neg Hx     Depression Neg Hx     Drug abuse Neg Hx     OCD Neg Hx     Paranoid behavior Neg Hx     Physical abuse Neg Hx     Schizophrenia Neg Hx     Seizures Neg Hx     Self injury Neg Hx     Sexual abuse Neg Hx      Suicide Neg Hx        Social History:  Social History     Tobacco Use    Smoking status: Current Every Day Smoker     Packs/day: 1.00     Years: 10.00     Pack years: 10.00     Types: Cigarettes    Smokeless tobacco: Never Used   Substance and Sexual Activity    Alcohol use: No    Drug use: No     Comment: history of MJ at age 17, pain pills, mushrooms, Sober for last 1.5 years    Sexual activity: Yes     Partners: Female        Review of Systems     Review of Systems   Constitutional: Negative for fever.   HENT: Negative for sore throat.    Respiratory: Negative for shortness of breath.    Cardiovascular: Negative for chest pain.   Gastrointestinal: Negative for nausea.   Genitourinary: Negative for dysuria.   Musculoskeletal: Negative for back pain.   Skin: Negative for rash.   Neurological: Negative for weakness.   Hematological: Does not bruise/bleed easily.   Psychiatric/Behavioral: Positive for agitation and hallucinations. Negative for suicidal ideas.        (-) homicidal ideation   All other systems reviewed and are negative.       Physical Exam     Initial Vitals [11/08/19 1338]   BP Pulse Resp Temp SpO2   122/76 88 18 98.6 °F (37 °C) 99 %      MAP       --          Physical Exam  Nursing Notes and Vital Signs Reviewed.  Constitutional: Patient is in no apparent distress. Well-developed and well-nourished.  Head: Atraumatic. Normocephalic.  Eyes: PERRL. EOM intact. Conjunctivae are not pale. No scleral icterus.  ENT: Mucous membranes are moist. Oropharynx is clear and symmetric.    Neck: Supple. Full ROM. No lymphadenopathy.  Cardiovascular: Regular rate. Regular rhythm. No murmurs, rubs, or gallops. Distal pulses are 2+ and symmetric.  Pulmonary/Chest: No respiratory distress. Clear to auscultation bilaterally. No wheezing or rales.  Abdominal: Soft and non-distended.  There is no tenderness.  No rebound, guarding, or rigidity. Good bowel sounds.  Genitourinary: No CVA tenderness  Musculoskeletal:  Moves all extremities. No obvious deformities. No edema.   Skin: Warm and dry.  Neurological:  Alert, awake, and appropriate.  Normal speech.  No acute focal neurological deficits are appreciated.  Psychiatric:               Behavior: agitated              Mood and Affect:  Paranoid               Suicidal Ideations:                Homicidal Ideations: No          ED Course   Procedures  ED Vital Signs:  Vitals:    11/08/19 1338   BP: 122/76   Pulse: 88   Resp: 18   Temp: 98.6 °F (37 °C)   TempSrc: Oral   SpO2: 99%       Abnormal Lab Results:  Labs Reviewed   CBC W/ AUTO DIFFERENTIAL - Abnormal; Notable for the following components:       Result Value    WBC 13.18 (*)     RBC 4.34 (*)     Hemoglobin 13.7 (*)     Hematocrit 39.7 (*)     Mean Corpuscular Hemoglobin 31.6 (*)     Platelets 418 (*)     MPV 8.8 (*)     Immature Granulocytes 0.9 (*)     Gran # (ANC) 8.7 (*)     Immature Grans (Abs) 0.12 (*)     Mono # 1.2 (*)     All other components within normal limits   COMPREHENSIVE METABOLIC PANEL - Abnormal; Notable for the following components:    AST 41 (*)     All other components within normal limits   SALICYLATE LEVEL - Abnormal; Notable for the following components:    Salicylate Lvl <5.0 (*)     All other components within normal limits   ACETAMINOPHEN LEVEL - Abnormal; Notable for the following components:    Acetaminophen (Tylenol), Serum 5.0 (*)     All other components within normal limits   URINALYSIS, REFLEX TO URINE CULTURE    Narrative:     Preferred Collection Type->Urine, Clean Catch   DRUG SCREEN PANEL, URINE EMERGENCY    Narrative:     Preferred Collection Type->Urine, Clean Catch   ALCOHOL,MEDICAL (ETHANOL)   TSH        All Lab Results:  Results for orders placed or performed during the hospital encounter of 11/08/19   CBC auto differential   Result Value Ref Range    WBC 13.18 (H) 3.90 - 12.70 K/uL    RBC 4.34 (L) 4.60 - 6.20 M/uL    Hemoglobin 13.7 (L) 14.0 - 18.0 g/dL    Hematocrit 39.7 (L) 40.0  - 54.0 %    Mean Corpuscular Volume 92 82 - 98 fL    Mean Corpuscular Hemoglobin 31.6 (H) 27.0 - 31.0 pg    Mean Corpuscular Hemoglobin Conc 34.5 32.0 - 36.0 g/dL    RDW 12.9 11.5 - 14.5 %    Platelets 418 (H) 150 - 350 K/uL    MPV 8.8 (L) 9.2 - 12.9 fL    Immature Granulocytes 0.9 (H) 0.0 - 0.5 %    Gran # (ANC) 8.7 (H) 1.8 - 7.7 K/uL    Immature Grans (Abs) 0.12 (H) 0.00 - 0.04 K/uL    Lymph # 2.9 1.0 - 4.8 K/uL    Mono # 1.2 (H) 0.3 - 1.0 K/uL    Eos # 0.2 0.0 - 0.5 K/uL    Baso # 0.06 0.00 - 0.20 K/uL    nRBC 0 0 /100 WBC    Gran% 65.9 38.0 - 73.0 %    Lymph% 21.9 18.0 - 48.0 %    Mono% 9.0 4.0 - 15.0 %    Eosinophil% 1.8 0.0 - 8.0 %    Basophil% 0.5 0.0 - 1.9 %    Differential Method Automated    Comprehensive metabolic panel   Result Value Ref Range    Sodium 142 136 - 145 mmol/L    Potassium 4.0 3.5 - 5.1 mmol/L    Chloride 106 95 - 110 mmol/L    CO2 25 23 - 29 mmol/L    Glucose 102 70 - 110 mg/dL    BUN, Bld 13 6 - 20 mg/dL    Creatinine 0.9 0.5 - 1.4 mg/dL    Calcium 9.7 8.7 - 10.5 mg/dL    Total Protein 7.1 6.0 - 8.4 g/dL    Albumin 4.2 3.5 - 5.2 g/dL    Total Bilirubin 0.3 0.1 - 1.0 mg/dL    Alkaline Phosphatase 67 55 - 135 U/L    AST 41 (H) 10 - 40 U/L    ALT 26 10 - 44 U/L    Anion Gap 11 8 - 16 mmol/L    eGFR if African American >60 >60 mL/min/1.73 m^2    eGFR if non African American >60 >60 mL/min/1.73 m^2   Urinalysis, Reflex to Urine Culture Urine, Clean Catch   Result Value Ref Range    Specimen UA Urine, Clean Catch     Color, UA Yellow Yellow, Straw, Aniya    Appearance, UA Clear Clear    pH, UA 7.0 5.0 - 8.0    Specific Gravity, UA 1.015 1.005 - 1.030    Protein, UA Negative Negative    Glucose, UA Negative Negative    Ketones, UA Negative Negative    Bilirubin (UA) Negative Negative    Occult Blood UA Negative Negative    Nitrite, UA Negative Negative    Urobilinogen, UA Negative <2.0 EU/dL    Leukocytes, UA Negative Negative   Drug screen panel, emergency   Result Value Ref Range     Benzodiazepines Negative     Methadone metabolites Negative     Cocaine (Metab.) Negative     Opiate Scrn, Ur Negative     Barbiturate Screen, Ur Negative     Amphetamine Screen, Ur Negative     THC Negative     Phencyclidine Negative     Creatinine, Random Ur 119.3 23.0 - 375.0 mg/dL    Toxicology Information SEE COMMENT    Ethanol   Result Value Ref Range    Alcohol, Medical, Serum <10 <10 mg/dL   Salicylate level   Result Value Ref Range    Salicylate Lvl <5.0 (L) 15.0 - 30.0 mg/dL   Acetaminophen level   Result Value Ref Range    Acetaminophen (Tylenol), Serum 5.0 (L) 10.0 - 20.0 ug/mL                 The Emergency Provider reviewed the vital signs and test results, which are outlined above.     ED Discussion     1122 AM: The PEC hold has been issued by Dr. Nova Fuentes at this time for angry mood, impaired, and positive deluisions.    2:40 PM: Pt has been medically cleared by Dr. Loza at this time. Reassessed pt at this time. Pt is resting comfortably and appears in no acute distress. There are no psychiatric services offered at this facility. D/w pt all pertinent ED information and plan to transfer to psychiatric facility for psychiatric treatment. Pt verbalizes understanding. Patient being transferred by [facility] for ongoing personal protection en route. Pt has been made aware of all risks and benefits associated with transfer, including but not limited to death, MVC, loss of vital signs, and/or permanent disability. Benefits include ability to be treated at an inpatient psychiatric facility. Pt will be transported by personnel trained in CPR and CPI. Patient understands that there could be unforeseen motor vehicle accidents, inclement weather, or loss of vital signs that could result in potential death or permanent disability. All questions and complaints have been addressed at this time. Pt condition is stable at this time and is clear to transfer to psychiatric facility at this time.            Medical  Decision Making:   Clinical Tests:   Lab Tests: Ordered and Reviewed           ED Medication(s):  Medications   nicotine 21 mg/24 hr 1 patch (1 patch Transdermal Patch Applied 11/8/19 1422)   LORazepam injection 1 mg (1 mg Intramuscular Given 11/8/19 1344)   haloperidol lactate injection 5 mg (5 mg Intramuscular Given 11/8/19 1409)       New Prescriptions    No medications on file               Scribe Attestation:   Scribe #1: I performed the above scribed service and the documentation accurately describes the services I performed. I attest to the accuracy of the note.     Attending:   Physician Attestation Statement for Scribe #1: I, Irving Loza MD, personally performed the services described in this documentation, as scribed by Дмитрий Louie, in my presence, and it is both accurate and complete.           Clinical Impression       ICD-10-CM ICD-9-CM   1. Bipolar affective disorder, remission status unspecified F31.9 296.80   2. Psychosis, unspecified psychosis type F29 298.9       Disposition:   Disposition: Transferred  Condition: Stable         Ivring Loza MD  11/08/19 3567

## 2019-11-08 NOTE — ED NOTES
Charge nurse received t/c from referral center regarding telepsych evaluation.  Current psychiatrist will be unable to conduct evaluation.  As a result, telepsych will be conducted by next shift's psychiatrist after 20:00.

## 2019-11-08 NOTE — ED NOTES
T/c to referral center to request telepsych evaluation.  Dr. Robb will call back for the evaluation.

## 2019-11-08 NOTE — ED NOTES
Pt belongings    Plastic bag 1:  Red [place] jacket, blue and camo fluffy hoodie, bandana, [place] beanie, two packs pall mall cigarettes, multiple cigarette butts    Plastic bag 2:  Green and navy light jacket, grey knit pants, white undershirt, black sweatpants, white sock, grey sock, cell phone holster, rubber bracelet, rubber band,     Backpack:  Charging cable, two keychains/zipper pulls, pair blue nikes, cell phone, 2 pairs of headphones, Bible, My Pocket Guide, blank journal, crossword book, black sketch book, coloring book, [employer] booklets, two folders with multiple papers, [employer], toothbrush, envelope of receipts, envelope of medicare papers and medicare card, photographs, hairbrush, two pens, black notebook, speaker    White envelope:  AAA battery, quarter, first aid kit with multiple items, nail clippers, watch, silver chain, cell phone    Belongings placed in PEC locker 28 due to volume.

## 2019-11-09 NOTE — PROVIDER PROGRESS NOTES - EMERGENCY DEPT.
Encounter Date: 11/8/2019    ED Physician Progress Notes       SCRIBE NOTE: I, Patricia Hackett, am scribing for, and in the presence of, .  Physician Statement: I, personally performed the services described in this documentation as scribed by Patricia Hackett in my presence, and it is both accurate and complete.          SCRIBE #1 NOTE: I, Patricia Hackett, am scribing for, and in the presence of,  Tina James MD. I have scribed the entire note.       7:00 PM: Pt has been placed in a psychiatric facility at this time. [employer] will be contacted for pt escort.  Accepting Facility: [facility]   Accepting Physician: Dr. Saleh

## 2020-08-01 PROBLEM — T50.901A DRUG OVERDOSE: Status: ACTIVE | Noted: 2020-08-01

## 2020-08-01 PROBLEM — M62.82 NON-TRAUMATIC RHABDOMYOLYSIS: Status: ACTIVE | Noted: 2020-08-01

## 2020-08-01 PROBLEM — F19.10 POLYSUBSTANCE ABUSE: Status: ACTIVE | Noted: 2020-08-01

## 2020-08-02 PROBLEM — J69.0 ASPIRATION PNEUMONIA: Status: ACTIVE | Noted: 2018-12-08

## 2020-08-02 PROBLEM — F33.2 SEVERE EPISODE OF RECURRENT MAJOR DEPRESSIVE DISORDER, WITHOUT PSYCHOTIC FEATURES: Status: ACTIVE | Noted: 2020-08-02

## 2020-08-03 PROBLEM — J96.01 ACUTE RESPIRATORY FAILURE WITH HYPOXIA: Status: ACTIVE | Noted: 2020-08-03

## 2020-09-18 ENCOUNTER — HOSPITAL ENCOUNTER (EMERGENCY)
Facility: HOSPITAL | Age: 40
End: 2020-09-19
Attending: EMERGENCY MEDICINE
Payer: MEDICARE

## 2020-09-18 VITALS
BODY MASS INDEX: 25.71 KG/M2 | RESPIRATION RATE: 18 BRPM | HEIGHT: 66 IN | OXYGEN SATURATION: 97 % | DIASTOLIC BLOOD PRESSURE: 76 MMHG | TEMPERATURE: 99 F | HEART RATE: 86 BPM | WEIGHT: 160 LBS | SYSTOLIC BLOOD PRESSURE: 116 MMHG

## 2020-09-18 DIAGNOSIS — F41.9 SEVERE ANXIETY: Primary | ICD-10-CM

## 2020-09-18 DIAGNOSIS — Z86.59 HISTORY OF BIPOLAR DISORDER: ICD-10-CM

## 2020-09-18 DIAGNOSIS — F11.20 OPIOID USE DISORDER, SEVERE, DEPENDENCE: ICD-10-CM

## 2020-09-18 LAB
ALBUMIN SERPL BCP-MCNC: 4.4 G/DL (ref 3.5–5.2)
ALP SERPL-CCNC: 63 U/L (ref 55–135)
ALT SERPL W/O P-5'-P-CCNC: 13 U/L (ref 10–44)
AMPHET+METHAMPHET UR QL: NEGATIVE
ANION GAP SERPL CALC-SCNC: 9 MMOL/L (ref 8–16)
APAP SERPL-MCNC: <3 UG/ML (ref 10–20)
AST SERPL-CCNC: 16 U/L (ref 10–40)
BACTERIA #/AREA URNS AUTO: NORMAL /HPF
BARBITURATES UR QL SCN>200 NG/ML: NEGATIVE
BASOPHILS # BLD AUTO: 0.02 K/UL (ref 0–0.2)
BASOPHILS NFR BLD: 0.2 % (ref 0–1.9)
BENZODIAZ UR QL SCN>200 NG/ML: NEGATIVE
BILIRUB SERPL-MCNC: 0.3 MG/DL (ref 0.1–1)
BILIRUB UR QL STRIP: NEGATIVE
BUN SERPL-MCNC: 19 MG/DL (ref 6–20)
BZE UR QL SCN: NEGATIVE
CALCIUM SERPL-MCNC: 8.9 MG/DL (ref 8.7–10.5)
CANNABINOIDS UR QL SCN: NEGATIVE
CHLORIDE SERPL-SCNC: 100 MMOL/L (ref 95–110)
CLARITY UR REFRACT.AUTO: ABNORMAL
CO2 SERPL-SCNC: 29 MMOL/L (ref 23–29)
COLOR UR AUTO: ABNORMAL
CREAT SERPL-MCNC: 0.8 MG/DL (ref 0.5–1.4)
CREAT UR-MCNC: 307 MG/DL (ref 23–375)
DIFFERENTIAL METHOD: ABNORMAL
EOSINOPHIL # BLD AUTO: 0.2 K/UL (ref 0–0.5)
EOSINOPHIL NFR BLD: 2.1 % (ref 0–8)
ERYTHROCYTE [DISTWIDTH] IN BLOOD BY AUTOMATED COUNT: 12.1 % (ref 11.5–14.5)
EST. GFR  (AFRICAN AMERICAN): >60 ML/MIN/1.73 M^2
EST. GFR  (NON AFRICAN AMERICAN): >60 ML/MIN/1.73 M^2
ETHANOL SERPL-MCNC: <10 MG/DL
GLUCOSE SERPL-MCNC: 94 MG/DL (ref 70–110)
GLUCOSE UR QL STRIP: NEGATIVE
HCT VFR BLD AUTO: 40 % (ref 40–54)
HGB BLD-MCNC: 12.9 G/DL (ref 14–18)
HGB UR QL STRIP: NEGATIVE
HYALINE CASTS UR QL AUTO: 0 /LPF
IMM GRANULOCYTES # BLD AUTO: 0.04 K/UL (ref 0–0.04)
IMM GRANULOCYTES NFR BLD AUTO: 0.4 % (ref 0–0.5)
KETONES UR QL STRIP: ABNORMAL
LEUKOCYTE ESTERASE UR QL STRIP: NEGATIVE
LYMPHOCYTES # BLD AUTO: 2.2 K/UL (ref 1–4.8)
LYMPHOCYTES NFR BLD: 21.9 % (ref 18–48)
MCH RBC QN AUTO: 29.5 PG (ref 27–31)
MCHC RBC AUTO-ENTMCNC: 32.3 G/DL (ref 32–36)
MCV RBC AUTO: 91 FL (ref 82–98)
METHADONE UR QL SCN>300 NG/ML: NEGATIVE
MICROSCOPIC COMMENT: NORMAL
MONOCYTES # BLD AUTO: 0.9 K/UL (ref 0.3–1)
MONOCYTES NFR BLD: 8.8 % (ref 4–15)
NEUTROPHILS # BLD AUTO: 6.7 K/UL (ref 1.8–7.7)
NEUTROPHILS NFR BLD: 66.6 % (ref 38–73)
NITRITE UR QL STRIP: NEGATIVE
NRBC BLD-RTO: 0 /100 WBC
OPIATES UR QL SCN: NEGATIVE
PCP UR QL SCN>25 NG/ML: NEGATIVE
PH UR STRIP: 5 [PH] (ref 5–8)
PLATELET # BLD AUTO: 270 K/UL (ref 150–350)
PMV BLD AUTO: 9.5 FL (ref 9.2–12.9)
POTASSIUM SERPL-SCNC: 3.6 MMOL/L (ref 3.5–5.1)
PROT SERPL-MCNC: 7.1 G/DL (ref 6–8.4)
PROT UR QL STRIP: ABNORMAL
RBC # BLD AUTO: 4.38 M/UL (ref 4.6–6.2)
RBC #/AREA URNS AUTO: 2 /HPF (ref 0–4)
SARS-COV-2 RDRP RESP QL NAA+PROBE: NEGATIVE
SODIUM SERPL-SCNC: 138 MMOL/L (ref 136–145)
SP GR UR STRIP: 1.03 (ref 1–1.03)
TOXICOLOGY INFORMATION: NORMAL
TSH SERPL DL<=0.005 MIU/L-ACNC: 0.48 UIU/ML (ref 0.4–4)
URN SPEC COLLECT METH UR: ABNORMAL
WBC # BLD AUTO: 10.12 K/UL (ref 3.9–12.7)
WBC #/AREA URNS AUTO: 3 /HPF (ref 0–5)

## 2020-09-18 PROCEDURE — U0002 COVID-19 LAB TEST NON-CDC: HCPCS

## 2020-09-18 PROCEDURE — 80320 DRUG SCREEN QUANTALCOHOLS: CPT

## 2020-09-18 PROCEDURE — 84443 ASSAY THYROID STIM HORMONE: CPT

## 2020-09-18 PROCEDURE — 80053 COMPREHEN METABOLIC PANEL: CPT

## 2020-09-18 PROCEDURE — 99284 PR EMERGENCY DEPT VISIT,LEVEL IV: ICD-10-PCS | Mod: ,,, | Performed by: PHYSICIAN ASSISTANT

## 2020-09-18 PROCEDURE — 85025 COMPLETE CBC W/AUTO DIFF WBC: CPT

## 2020-09-18 PROCEDURE — 99285 EMERGENCY DEPT VISIT HI MDM: CPT

## 2020-09-18 PROCEDURE — 99284 EMERGENCY DEPT VISIT MOD MDM: CPT | Mod: ,,, | Performed by: PHYSICIAN ASSISTANT

## 2020-09-18 PROCEDURE — 80307 DRUG TEST PRSMV CHEM ANLYZR: CPT

## 2020-09-18 PROCEDURE — 25000003 PHARM REV CODE 250: Performed by: PHYSICIAN ASSISTANT

## 2020-09-18 PROCEDURE — 80329 ANALGESICS NON-OPIOID 1 OR 2: CPT

## 2020-09-18 PROCEDURE — 81001 URINALYSIS AUTO W/SCOPE: CPT

## 2020-09-18 RX ORDER — SERTRALINE HYDROCHLORIDE 50 MG/1
150 TABLET, FILM COATED ORAL DAILY
COMMUNITY
Start: 2020-09-17 | End: 2020-10-17

## 2020-09-18 RX ORDER — LOPERAMIDE HYDROCHLORIDE 2 MG/1
2 CAPSULE ORAL 4 TIMES DAILY
Status: DISCONTINUED | OUTPATIENT
Start: 2020-09-19 | End: 2020-09-19 | Stop reason: HOSPADM

## 2020-09-18 RX ORDER — SERTRALINE HYDROCHLORIDE 50 MG/1
150 TABLET, FILM COATED ORAL DAILY
Status: DISCONTINUED | OUTPATIENT
Start: 2020-09-19 | End: 2020-09-19 | Stop reason: HOSPADM

## 2020-09-18 RX ORDER — RISPERIDONE 3 MG/1
3 TABLET ORAL 2 TIMES DAILY
COMMUNITY
Start: 2020-09-16 | End: 2020-10-16

## 2020-09-18 RX ORDER — IBUPROFEN 600 MG/1
600 TABLET ORAL EVERY 6 HOURS PRN
Status: DISCONTINUED | OUTPATIENT
Start: 2020-09-18 | End: 2020-09-19 | Stop reason: HOSPADM

## 2020-09-18 RX ORDER — BUPRENORPHINE AND NALOXONE 4; 1 MG/1; MG/1
2 FILM, SOLUBLE BUCCAL; SUBLINGUAL 2 TIMES DAILY
COMMUNITY
Start: 2020-09-16 | End: 2020-09-30

## 2020-09-18 RX ORDER — METHOCARBAMOL 500 MG/1
1000 TABLET, FILM COATED ORAL EVERY 8 HOURS PRN
Status: DISCONTINUED | OUTPATIENT
Start: 2020-09-18 | End: 2020-09-19 | Stop reason: HOSPADM

## 2020-09-18 RX ORDER — DICYCLOMINE HYDROCHLORIDE 10 MG/1
10 CAPSULE ORAL EVERY 6 HOURS PRN
Status: DISCONTINUED | OUTPATIENT
Start: 2020-09-18 | End: 2020-09-19 | Stop reason: HOSPADM

## 2020-09-18 RX ORDER — ONDANSETRON 4 MG/1
4 TABLET, ORALLY DISINTEGRATING ORAL EVERY 6 HOURS PRN
Status: DISCONTINUED | OUTPATIENT
Start: 2020-09-18 | End: 2020-09-19 | Stop reason: HOSPADM

## 2020-09-18 RX ORDER — RISPERIDONE 1 MG/1
3 TABLET ORAL 2 TIMES DAILY
Status: DISCONTINUED | OUTPATIENT
Start: 2020-09-18 | End: 2020-09-19 | Stop reason: HOSPADM

## 2020-09-18 RX ORDER — LORAZEPAM 1 MG/1
1 TABLET ORAL
Status: COMPLETED | OUTPATIENT
Start: 2020-09-18 | End: 2020-09-18

## 2020-09-18 RX ADMIN — LORAZEPAM 1 MG: 1 TABLET ORAL at 07:09

## 2020-09-19 NOTE — ED NOTES
Ashwin Madrid, an 40 y.o. male presents to the ED with extensive psychiatric and substance abuse issues after leaving [facility].  Patient didn't feel safe because other residents give him access to drugs.  Patient extremely anxious on arrival.  Denies SI but his relative is afraid he may harm himself.   Patient denies any substance abuse today.  He was discharged from [facility] a couple of weeks ago and prior to that overdosed and had to be intubated in August.        Review of patient's allergies indicates:  No Known Allergies  Chief Complaint   Patient presents with    Psychiatric Evaluation     dc'd 2d from [place], still feeling depressed, + suicidal     Past Medical History:   Diagnosis Date    ADHD (attention deficit hyperactivity disorder) 2/7/2012    Anxiety     Bipolar 1 disorder 2/7/2012    Depression     GERD (gastroesophageal reflux disease)

## 2020-09-19 NOTE — HPI
"Emergency Psychiatry Consult Note    9/18/2020 9:28 PM  Ashwin Madrid  MRN: [id number]    Chief Complaint / Reason for Consult: {Psych eval reasons:05752}     SUBJECTIVE     History of Present Illness:   Aswhin Madrid is a 40 y.o. male with a past psychiatric history of ***, currently presenting with <principal problem not specified>. Emergency Psychiatry was originally consulted to address the patient's symptoms of {Psych eval reasons:55568}.    Per ED RN(s):  ***    Per ED MD:  ***    Per Psychiatry:  Upon initiation of interview, pt was ***    Psychiatric Review of Systems:  sleep: {YES/NO:86235::"no"}  appetite: {YES/NO:52466::"no"}  weight: {YES/NO:06151::"no"}  energy/anergy: {YES/NO:54356::"no"}  interest/pleasure/anhedonia: {YES/NO:25267::"no"}  somatic symptoms: {YES/NO:42738::"no"}  libido: {YES/NO:54171::"no"}  anxiety/panic: {YES/NO:38699::"no"}  guilty/hopelessness: {YES/NO:16401::"no"}  concentration: {YES/NO:37964::"no"}  S.I.B.s/risky behavior: {YES/NO:94919::"no"}  any drugs: {YES/NO:89745::"no"}  alcohol: {YES/NO:32311::"no"}     Medical Review Of Systems:  Pertinent items noted in HPI    Psychiatric History:  Diagnose(s): {YES **/NO:21893}  Previous Medication Trials: {YES **/NO:21893}  Previous Psychiatric Hospitalizations: {YES **/NO:21893}  Family Psychiatric History: {YES **/NO:21893}  Outpatient Psychiatrist: {YES **/NO:21893}  Outpatient Therapist: {YES **/NO:21893}    Suicide/Violence Risk Assessment:  Current/active suicidal ideation/plan/intent: {YES **/NO:21893}  Previous suicide attempts: {YES **/NO:21893}  Current/active homicidal ideation/plan/intent: {YES **/NO:44303}  History of threats/arrests associated with violent conduct - {YES **/NO:82528}  Access to firearms/lethal weapons - {YES **/NO:13913}    Social History:  Marital Status: {GEN; MARITAL STATUS:62016}  Children: {NUMBERS 0-12:16586}   Employment Status: {DESC; EMPLOYMENT STATUS:67486}  Education: {misc; " "education:39174}  Special Ed: {Responses; yes/no/unknown:74}  Housing Status: {YES **/NO:62563}  Developmental History: {YES **/NO:50519}  History of Abuse: {YES **/NO:01634}    Substance Abuse History:  Recreational Drugs: {recreationaldrugs:02516}  Use of Alcohol: {etoh use:18057}  Rehab History: {YES **/NO:70668}  Tobacco Use: {YES **/NO:20870}  Use of Caffeine: {YES **/NO:02335}  Use of OTC: {YES **/NO:57208}  Is the patient aware of the biomedical complications associated with substance abuse and mental illness? {YES **/NO:77803}  Legal consequences of chemical use: {YES **/NO:35878}    Legal History:  Past Charges/Incarcerations: {YES **/NO:61097}  Pending Charges: {YES **/NO:29338}    Psychosocial Factors:  Stressors: { :75091}.   Functioning Relationships: {Psychfxinrelationships:05774}    Collateral:   {YES **/NO:53015}    Scheduled Meds:    Psychotherapeutics (From admission, onward)    None        PRN Meds:    Home Meds:  Prior to Admission medications    Medication Sig Start Date End Date Taking? Authorizing Provider   naloxone (NARCAN) 4 mg/actuation Spry 4mg by nasal route as needed for opioid overdose; may repeat every 2-3 minutes in alternating nostrils until medical help arrives. Call 911 8/1/20   Gavin Anderson MD     Psychotherapeutics (From admission, onward)    None        Allergies:  Patient has no known allergies.  Past Medical/Surgical History:  Past Medical History:   Diagnosis Date    ADHD (attention deficit hyperactivity disorder) 2/7/2012    Anxiety     Bipolar 1 disorder 2/7/2012    Depression     GERD (gastroesophageal reflux disease)      Past Surgical History:   Procedure Laterality Date    ESOPHAGOGASTRODUODENOSCOPY       OBJECTIVE     Vital Signs:  Temp:  [98.8 °F (37.1 °C)]   Pulse:  [86]   Resp:  [18]   BP: (116)/(76)   SpO2:  [97 %]     Mental Status Exam:  Appearance: {appearance:29353::"unremarkable","age appropriate"}  Level of Consciousness: " "***  Behavior/Cooperation: {behavior:30800::"normal","cooperative"}  Psychomotor: {Psychomotoragit:00101}   Speech: {findings; speech:28373::"normal tone","normal rate","normal pitch","normal volume"}  Language: english, fluid  Orientation: {orientation:94669::"grossly intact"}  Attention Span/Concentration: {EXAM; ATTENTION SPAN WORLD:43588}  Memory: {Memory:20786}  Mood: "{mood:20777}"  Affect: {desc; affect:67909::"normal"}  Thought Process: linear, {thought process:42714::"normal and logical"}  Associations: {thought process:15065::"normal and logical"}  Thought Content: {thought content:07731::"normal, no suicidality, no homicidality, delusions, or paranoia"}  Fund of Knowledge: {PSY - FUND OF KNOWLEDGE:78457}  Abstraction: {MISC; ABSTRACT/CONCRETE:39528}  Insight: {insight:85632}  Judgment: {judgment:40672}    Laboratory Data:  Recent Results (from the past 48 hour(s))   Drug screen panel, emergency    Collection Time: 09/18/20  7:41 PM   Result Value Ref Range    Benzodiazepines Negative     Methadone metabolites Negative     Cocaine (Metab.) Negative     Opiate Scrn, Ur Negative     Barbiturate Screen, Ur Negative     Amphetamine Screen, Ur Negative     THC Negative     Phencyclidine Negative     Creatinine, Random Ur 307.0 23.0 - 375.0 mg/dL    Toxicology Information SEE COMMENT    Urinalysis, Reflex to Urine Culture Urine, Clean Catch    Collection Time: 09/18/20  7:42 PM    Specimen: Urine   Result Value Ref Range    Specimen UA Urine, Clean Catch     Color, UA Aniya Yellow, Straw, Aniya    Appearance, UA Cloudy (A) Clear    pH, UA 5.0 5.0 - 8.0    Specific Gravity, UA 1.030 1.005 - 1.030    Protein, UA 1+ (A) Negative    Glucose, UA Negative Negative    Ketones, UA 1+ (A) Negative    Bilirubin (UA) Negative Negative    Occult Blood UA Negative Negative    Nitrite, UA Negative Negative    Leukocytes, UA Negative Negative   COVID-19 Rapid Screening    Collection Time: 09/18/20  7:42 PM   Result Value Ref " Range    SARS-CoV-2 RNA, Amplification, Qual Negative Negative   Urinalysis Microscopic    Collection Time: 09/18/20  7:42 PM   Result Value Ref Range    RBC, UA 2 0 - 4 /hpf    WBC, UA 3 0 - 5 /hpf    Bacteria Rare None-Occ /hpf    Hyaline Casts, UA 0 0-1/lpf /lpf    Microscopic Comment SEE COMMENT    CBC auto differential    Collection Time: 09/18/20  7:50 PM   Result Value Ref Range    WBC 10.12 3.90 - 12.70 K/uL    RBC 4.38 (L) 4.60 - 6.20 M/uL    Hemoglobin 12.9 (L) 14.0 - 18.0 g/dL    Hematocrit 40.0 40.0 - 54.0 %    Mean Corpuscular Volume 91 82 - 98 fL    Mean Corpuscular Hemoglobin 29.5 27.0 - 31.0 pg    Mean Corpuscular Hemoglobin Conc 32.3 32.0 - 36.0 g/dL    RDW 12.1 11.5 - 14.5 %    Platelets 270 150 - 350 K/uL    MPV 9.5 9.2 - 12.9 fL    Immature Granulocytes 0.4 0.0 - 0.5 %    Gran # (ANC) 6.7 1.8 - 7.7 K/uL    Immature Grans (Abs) 0.04 0.00 - 0.04 K/uL    Lymph # 2.2 1.0 - 4.8 K/uL    Mono # 0.9 0.3 - 1.0 K/uL    Eos # 0.2 0.0 - 0.5 K/uL    Baso # 0.02 0.00 - 0.20 K/uL    nRBC 0 0 /100 WBC    Gran% 66.6 38.0 - 73.0 %    Lymph% 21.9 18.0 - 48.0 %    Mono% 8.8 4.0 - 15.0 %    Eosinophil% 2.1 0.0 - 8.0 %    Basophil% 0.2 0.0 - 1.9 %    Differential Method Automated    Comprehensive metabolic panel    Collection Time: 09/18/20  7:50 PM   Result Value Ref Range    Sodium 138 136 - 145 mmol/L    Potassium 3.6 3.5 - 5.1 mmol/L    Chloride 100 95 - 110 mmol/L    CO2 29 23 - 29 mmol/L    Glucose 94 70 - 110 mg/dL    BUN, Bld 19 6 - 20 mg/dL    Creatinine 0.8 0.5 - 1.4 mg/dL    Calcium 8.9 8.7 - 10.5 mg/dL    Total Protein 7.1 6.0 - 8.4 g/dL    Albumin 4.4 3.5 - 5.2 g/dL    Total Bilirubin 0.3 0.1 - 1.0 mg/dL    Alkaline Phosphatase 63 55 - 135 U/L    AST 16 10 - 40 U/L    ALT 13 10 - 44 U/L    Anion Gap 9 8 - 16 mmol/L    eGFR if African American >60.0 >60 mL/min/1.73 m^2    eGFR if non African American >60.0 >60 mL/min/1.73 m^2   Ethanol    Collection Time: 09/18/20  7:50 PM   Result Value Ref Range     Alcohol, Medical, Serum <10 <10 mg/dL   Acetaminophen level    Collection Time: 09/18/20  7:50 PM   Result Value Ref Range    Acetaminophen (Tylenol), Serum <3.0 (L) 10.0 - 20.0 ug/mL      Lab Results   Component Value Date    VALPROATE <12.5 (L) 03/14/2015     Imaging:  Imaging Results    None          ASSESSMENT     Ashwin Madrid is a 40 y.o. male with a past psychiatric history of ***, currently presenting with <principal problem not specified>.  Emergency Psychiatry was originally consulted to address the patient's symptoms of {Psych eval reasons:62058}.    IMPRESSION  ***    RECOMMENDATION(S)      1. Scheduled Medication(s):  ***    2. PRN Medication(s):  ***    3. Legal Status/Precaution(s):  Patient does not meet criteria for PEC or inpatient psychiatric admission at this time. Recommend to rescind PEC if one was placed. Patient is not currently an imminent danger to self or others and is not gravely disabled due to a psychiatric illness. Patient is cleared from a psychiatric standpoint and can be discharged to home/self-care; will sign off. Please provide a resource sheet if needed.  Continue PEC at this time as the patient is currently ***. Seek inpatient bed for patient safety and stabilization when/if medically cleared by the ER MD. Continue to observe patient's behavior while in the ER and reassess the patient daily until placement is found.      In cases of emergency, daily coverage provided by Acute/ED Psych MD, NP, or SW, with associated contact numbers listed in the [facility] On Call Schedule.    Case discussed with emergency psychiatry staff: ***      Taisha Duque, DO   [facility], PGY-3

## 2020-09-19 NOTE — ED PROVIDER NOTES
"Encounter Date: 9/18/2020       History     Chief Complaint   Patient presents with    Psychiatric Evaluation     dc'd 2d from [place], still feeling depressed, + suicidal     HPI   Ashwin Madrid is a 40 y.o. male with medical history of bipolar I disorder, polysbustance use disorder, opioid use disorder on partial agonist MAT, recent opioid overdose requiring intubation (8/1/20) presenting to the ED with the chief complaint of psychiatric evaluation. History provided by patient and his aunt at bedside. Patient recently discharged from psychiatric stay at [facility] 2 days ago and has been staying at [facility]. Patient has received illicit substances from residents at [facility] previously and did not feel safe there and subsequently left today. Patient called his aunt earlier today as he had no where to go and she brought him to [facility] ED for evaluation. She expresses concern that he is "not right" and wants him to be placed into a rehab facility. Patient's aunt denies specific reports of SI plans today, but states she is scared he may harm himself. She reports patient may have a bed at [facility] on Monday (9/21/2020). Patient reports he is feeling anxious and requesting "a shot of ativan." He has difficulty elaborating on his symptoms and keeps repeating that he is feeling anxious. He denies SI/HI/Hallucinations. He denies using recreational drugs or ETOH today. He does not have a plan if he was to be discharged from the ED today. He denies headache, fever, chest pain, SOB, cough, abdominal pain, nausea, vomiting, urinary or bowel movement changes.     Review of patient's allergies indicates:  No Known Allergies  Past Medical History:   Diagnosis Date    ADHD (attention deficit hyperactivity disorder) 2/7/2012    Anxiety     Bipolar 1 disorder 2/7/2012    Depression     GERD (gastroesophageal reflux disease)      Past Surgical History:   Procedure Laterality Date    ESOPHAGOGASTRODUODENOSCOPY   "     Family History   Problem Relation Age of Onset    ADD / ADHD Neg Hx     Alcohol abuse Neg Hx     Anxiety disorder Neg Hx     Bipolar disorder Neg Hx     Dementia Neg Hx     Depression Neg Hx     Drug abuse Neg Hx     OCD Neg Hx     Paranoid behavior Neg Hx     Physical abuse Neg Hx     Schizophrenia Neg Hx     Seizures Neg Hx     Self injury Neg Hx     Sexual abuse Neg Hx     Suicide Neg Hx      Social History     Tobacco Use    Smoking status: Current Every Day Smoker     Packs/day: 1.00     Years: 10.00     Pack years: 10.00     Types: Cigarettes    Smokeless tobacco: Never Used   Substance Use Topics    Alcohol use: No    Drug use: No     Comment: history of MJ at age 17, pain pills, mushrooms, Sober for last 1.5 years     Review of Systems   Constitutional: Negative for chills, diaphoresis and fever.   HENT: Negative for sore throat.    Eyes: Negative for pain and redness.   Respiratory: Negative for shortness of breath.    Cardiovascular: Negative for chest pain.   Gastrointestinal: Negative for nausea.   Genitourinary: Negative for dysuria.   Musculoskeletal: Negative for back pain.   Skin: Negative for rash.   Neurological: Negative for weakness.   Hematological: Does not bruise/bleed easily.   Psychiatric/Behavioral: Negative for hallucinations and suicidal ideas. The patient is nervous/anxious. The patient is not hyperactive.        Physical Exam     Initial Vitals [09/18/20 1854]   BP Pulse Resp Temp SpO2   116/76 86 18 98.8 °F (37.1 °C) 97 %      MAP       --         Physical Exam    Constitutional: He appears well-developed and well-nourished. He is not diaphoretic. No distress.   HENT:   Head: Normocephalic and atraumatic.   Mouth/Throat: Oropharynx is clear and moist. No oropharyngeal exudate.   Eyes: EOM are normal. Pupils are equal, round, and reactive to light.   Neck: Normal range of motion. Neck supple.   Cardiovascular: Normal rate, regular rhythm and intact distal pulses.    Pulmonary/Chest: Breath sounds normal. No respiratory distress. He has no wheezes.   Abdominal: Soft. Bowel sounds are normal. There is no abdominal tenderness. There is no guarding.   Musculoskeletal: Normal range of motion. No tenderness or edema.   Lymphadenopathy:     He has no cervical adenopathy.   Neurological: He is alert and oriented to person, place, and time. He has normal strength. No cranial nerve deficit.   Skin: Skin is warm and dry. No erythema.   Psychiatric: His speech is normal and behavior is normal. His mood appears anxious. Cognition and memory are impaired. He expresses no homicidal and no suicidal ideation. He expresses no suicidal plans and no homicidal plans.       ED Course   Procedures  Labs Reviewed   CBC W/ AUTO DIFFERENTIAL - Abnormal; Notable for the following components:       Result Value    RBC 4.38 (*)     Hemoglobin 12.9 (*)     All other components within normal limits   URINALYSIS, REFLEX TO URINE CULTURE - Abnormal; Notable for the following components:    Appearance, UA Cloudy (*)     Protein, UA 1+ (*)     Ketones, UA 1+ (*)     All other components within normal limits    Narrative:     Specimen Source->Urine   ACETAMINOPHEN LEVEL - Abnormal; Notable for the following components:    Acetaminophen (Tylenol), Serum <3.0 (*)     All other components within normal limits   COMPREHENSIVE METABOLIC PANEL   TSH   DRUG SCREEN PANEL, URINE EMERGENCY    Narrative:     Specimen Source->Urine   ALCOHOL,MEDICAL (ETHANOL)   SARS-COV-2 RNA AMPLIFICATION, QUAL   URINALYSIS MICROSCOPIC    Narrative:     Specimen Source->Urine          Imaging Results    None          Medical Decision Making:   History:   Old Medical Records: I decided to obtain old medical records.  Old Records Summarized: records from clinic visits and records from previous admission(s).  Clinical Tests:   Lab Tests: Ordered and Reviewed  Other:   I have discussed this case with another health care provider.       <>  "Summary of the Discussion: Psychiatry       APC / Resident Notes:   40 y.o. male with medical history of bipolar I disorder, polysbustance use disorder, opioid use disorder on partial agonist MAT, recent opioid overdose requiring intubation (8/1/20) presenting to the ED c/o psychiatric evaluation. Recently discharged from [facility] for psychiatric stay 2 days ago and has been staying at [facility]. Reports feeling "anxious" and has difficulty elaborating more on his symptoms with me on exam. Aunt at bedside expresses concern he may harm himself if he was to be discharged, but no specific SI plan. He may have a bed reserved at [facility] on Monday. DDx includes but not limited to depression, bipolar disorder, medication noncompliance, schizophrenia, social stressors, alcohol or drug abuse, suicidal ideation, metabolic abnormality.    11:57 PM  Discussed with psychiatry, patient does not legally meet PEC criteria at this time but recommending FVA to inpatient psychiatric facility. Patient has severe anxiety and depression and high risk to be discharge given his recent opiate overdose. FVA paper work filled out and he will be transferred to a psychiatric facility for further management. Medically cleared for placement. PRN medications placed. Patient expresses understanding and agreeable to the plan. I have discussed the care of this patient with my supervising physician.                    Medically cleared for psychiatry placement: 9/18/2020 10:23 PM                Clinical Impression:     ICD-10-CM ICD-9-CM   1. Severe anxiety  F41.9 300.00   2. Opioid use disorder, severe, dependence  F11.20 304.00   3. History of bipolar disorder  Z86.59 V11.1                      Disposition:   Disposition: Transferred  Condition: Fair     ED Disposition Condition    Transfer to Psych Facility         ED Prescriptions     None        Follow-up Information    None                                      Jasiel Baires PA-C  09/19/20 " 0001

## 2020-09-19 NOTE — CONSULTS
"Emergency Psychiatry Consult Note    9/18/2020 9:28 PM  Ashwin Madrid  MRN: [id number]    Chief Complaint / Reason for Consult: anxiety/need for PEC     SUBJECTIVE     History of Present Illness:   Ashwin Madrid is a 40 y.o. male with a past psychiatric history of bipolar 1 disorder, opiate use disorder (IV heroin), currently presenting with anxiety. Emergency Psychiatry was originally consulted to address the patient's symptoms of anxiety, depression, and substance abuse, question need for PEC.    Per ED RN(s):  Ashwin Madrid, an 40 y.o. male presents to the ED with extensive psychiatric and substance abuse issues after leaving [facility].  Patient didn't feel safe because other residents give him access to drugs.  Patient extremely anxious on arrival.  Denies SI but his relative is afraid he may harm himself.   Patient denies any substance abuse today.  He was discharged from [facility] a couple of weeks ago and prior to that overdosed and had to be intubated in August.       Per ED MD:  Ashwin Madrid is a 40 y.o. male with medical history of bipolar I disorder, polysbustance use disorder, opioid use disorder on partial agonist MAT, recent opioid overdose requiring intubation (8/1/20) presenting to the ED with the chief complaint of psychiatric evaluation. History provided by patient and his aunt at bedside. Patient recently discharged from psychiatric stay at [facility] 2 days ago and has been staying at [facility]. Patient has received illicit substances from residents at [facility] previously and did not feel safe there and subsequently left today. Patient called his aunt earlier today as he had no where to go and she brought him to [facility] for evaluation. She expresses concern that he is "not right" and wants him to be placed into a rehab facility. Patient's aunt denies specific reports of SI plans today, but states she is scared he may harm himself. She reports patient may have a bed at [facility] on Monday (9/21/2020). Patient reports he is feeling anxious and requesting "a shot of ativan." He has difficulty elaborating on his symptoms and keeps repeating that he is feeling anxious. He denies SI/HI/Hallucinations. He denies using recreational drugs or ETOH today. He does not have a plan if he was to be discharged from the ED today. He denies headache, fever, chest pain, SOB, cough, abdominal pain, nausea, vomiting, urinary or bowel movement changes.     Per Psychiatry:  TELE PSYCHIATRY Disclaimer   *The patient was informed despite using HIPPA compliant technology there may be risks including security breach, technological failure, inability to perform a comprehensive physical exam which could delay or prevent an accurate diagnosis, and potential complications from treatment decisions rendered over a telemedical platform. The patient understands and consented to the use of tele-health service as being a safe measure to mitigate during COVID 19 Pandemic .  The patient was also informed of the relationship between the physician and patient and the respective role of any other health care provider with respect to management of the patient; and notified that the pt may decline to receive medical services by telemedicine and may withdraw from such care at any time.     Patient's Current location: [facility]  Visit type: Virtual visit with synchronous audio and video  Total time spent with patient: 30 minutes      Upon initiation of interview, pt was laying in bed with mask on in NAD, appears drowsy, s/p PRN ativan for anxiety. Pt's aunt is at bedside who relays story of recent heroin overdose and subsequent intubated and extended hospital stay last month at [facility], followed by a psychiatric admission. Pt has been attending drug rehab at [facility] but left today in a panic. Pt is a poor historian and cannot elaborate as to why he left. Complains of "anxiety and depression", cannot explicitly say what he is " "worried about. Says his mood is "down". He wants to be admitted to get his psych meds right. Denies SI/HI/AVH.    Pt endorses wanting to attend rehab to get off of drugs. Does not elaborate. Drug of choice and only recent drug use is intravenous heroin  dollars a day. Last used a few weeks ago. First used at 21yo, stayed sober for 14 months at [facility] about 7 months ago.     Dad  when he was 15yo, started smoking marijuana, dropped out of school, started using heroin.    History of bipolar disorder but unclear when this was diagnosed. Tried methadone clinic but the timing at [facility] clinic was too regulated. Suboxone worked well for cravings and withdrawal.    Pt's aunt states she was trying to get him into another rehab program today but was having difficulty secondary to the upcoming weekend. She says [facility] would have provided a bed today but she couldn't get there early enough.    Housing situation is unstable, was living with his mother near [place] but is not welcome back unless he attends rehab, pt's sister just had a baby. Pt cannot stay with his Aunt who is at bedside as she lives in a [facility] community which is not allowing visitors secondary to COVID restrictions.    Psychiatric Review of Systems:  sleep: yes  appetite: no  weight: no  energy/anergy: yes  interest/pleasure/anhedonia: yes  somatic symptoms: no  libido: no  anxiety/panic: yes  guilty/hopelessness: yes  concentration: no  S.I.B.s/risky behavior: no  any drugs: no, recent history of heroin overdose  alcohol: no     Medical Review Of Systems:  Pertinent items noted in HPI    Psychiatric History:  Diagnose(s): Yes - bipolar 1, opiate use disorder  Previous Medication Trials: trileptal, melatonin, elavil, gabapentin, lexapro, topamax, wellbutrin, geodon    Discharge meds from 2020:  Subutex 8mg BID  Risperdal 3mg BID  Zoloft 150mg qd    Previous Psychiatric Hospitalizations: "a lot", most recently at [facility] 2 days " ago  Family Psychiatric History: No  Outpatient Psychiatrist: No  Outpatient Therapist: No    Suicide/Violence Risk Assessment:  Current/active suicidal ideation/plan/intent: No  Previous suicide attempts: No  Current/active homicidal ideation/plan/intent: No  History of threats/arrests associated with violent conduct - No  Access to firearms/lethal weapons - No    Social History:  Marital Status: not   Children: 0   Employment Status: on disability  Education: 11th grade  Special Ed: no  Housing Status: Yes - mother's home [place], not welcome back right now until he gets help for heroin  Developmental History: No  History of Abuse: No    Substance Abuse History:  Recreational Drugs: heroin  Use of Alcohol: denied  Rehab History: Approximately 15 times all in [place]  Tobacco Use: Yes - 1ppd since age 19yo  Use of Caffeine: No  Use of OTC: No  Is the patient aware of the biomedical complications associated with substance abuse and mental illness? No  Legal consequences of chemical use: Yes - arrested for possession of narcotics    Legal History:  Past Charges/Incarcerations: Yes - arrested for possession of narcotics  Pending Charges: No    Psychosocial Factors:  Stressors: health and drug and alcohol.   Functioning Relationships: good support system    Collateral:   Yes - Aunt at bedside, see HPI    Scheduled Meds:    Psychotherapeutics (From admission, onward)    None        PRN Meds:    Home Meds:  Prior to Admission medications    Medication Sig Start Date End Date Taking? Authorizing Provider   naloxone (NARCAN) 4 mg/actuation Spry 4mg by nasal route as needed for opioid overdose; may repeat every 2-3 minutes in alternating nostrils until medical help arrives. Call 911 8/1/20   Gavin Anderson MD     Psychotherapeutics (From admission, onward)    None        Allergies:  Patient has no known allergies.  Past Medical/Surgical History:  Past Medical History:   Diagnosis Date    ADHD (attention  "deficit hyperactivity disorder) 2/7/2012    Anxiety     Bipolar 1 disorder 2/7/2012    Depression     GERD (gastroesophageal reflux disease)      Past Surgical History:   Procedure Laterality Date    ESOPHAGOGASTRODUODENOSCOPY       OBJECTIVE     Vital Signs:  Temp:  [98.8 °F (37.1 °C)]   Pulse:  [86]   Resp:  [18]   BP: (116)/(76)   SpO2:  [97 %]     Mental Status Exam:  Appearance: unremarkable, age appropriate, lying in bed  Level of Consciousness: Drowsy  Behavior/Cooperation: cooperative, psychomotor retardation  Psychomotor: psychomotor retardation   Speech: normal tone, normal pitch, slowed, soft  Language: english, fluid  Orientation: grossly intact  Attention Span/Concentration: concentration diminished, s/p ativan  Memory: Grossly intact  Mood: "anxious"  Affect: constricted  Thought Process: linear, poverty of thought  Associations: poverty of thought  Thought Content: normal, no suicidality, no homicidality, delusions, or paranoia  Fund of Knowledge: Intact  Abstraction: did not assess  Insight: fair  Judgment: limited    Laboratory Data:  Recent Results (from the past 48 hour(s))   Drug screen panel, emergency    Collection Time: 09/18/20  7:41 PM   Result Value Ref Range    Benzodiazepines Negative     Methadone metabolites Negative     Cocaine (Metab.) Negative     Opiate Scrn, Ur Negative     Barbiturate Screen, Ur Negative     Amphetamine Screen, Ur Negative     THC Negative     Phencyclidine Negative     Creatinine, Random Ur 307.0 23.0 - 375.0 mg/dL    Toxicology Information SEE COMMENT    Urinalysis, Reflex to Urine Culture Urine, Clean Catch    Collection Time: 09/18/20  7:42 PM    Specimen: Urine   Result Value Ref Range    Specimen UA Urine, Clean Catch     Color, UA Aniya Yellow, Straw, Aniya    Appearance, UA Cloudy (A) Clear    pH, UA 5.0 5.0 - 8.0    Specific Gravity, UA 1.030 1.005 - 1.030    Protein, UA 1+ (A) Negative    Glucose, UA Negative Negative    Ketones, UA 1+ (A) Negative "    Bilirubin (UA) Negative Negative    Occult Blood UA Negative Negative    Nitrite, UA Negative Negative    Leukocytes, UA Negative Negative   COVID-19 Rapid Screening    Collection Time: 09/18/20  7:42 PM   Result Value Ref Range    SARS-CoV-2 RNA, Amplification, Qual Negative Negative   Urinalysis Microscopic    Collection Time: 09/18/20  7:42 PM   Result Value Ref Range    RBC, UA 2 0 - 4 /hpf    WBC, UA 3 0 - 5 /hpf    Bacteria Rare None-Occ /hpf    Hyaline Casts, UA 0 0-1/lpf /lpf    Microscopic Comment SEE COMMENT    CBC auto differential    Collection Time: 09/18/20  7:50 PM   Result Value Ref Range    WBC 10.12 3.90 - 12.70 K/uL    RBC 4.38 (L) 4.60 - 6.20 M/uL    Hemoglobin 12.9 (L) 14.0 - 18.0 g/dL    Hematocrit 40.0 40.0 - 54.0 %    Mean Corpuscular Volume 91 82 - 98 fL    Mean Corpuscular Hemoglobin 29.5 27.0 - 31.0 pg    Mean Corpuscular Hemoglobin Conc 32.3 32.0 - 36.0 g/dL    RDW 12.1 11.5 - 14.5 %    Platelets 270 150 - 350 K/uL    MPV 9.5 9.2 - 12.9 fL    Immature Granulocytes 0.4 0.0 - 0.5 %    Gran # (ANC) 6.7 1.8 - 7.7 K/uL    Immature Grans (Abs) 0.04 0.00 - 0.04 K/uL    Lymph # 2.2 1.0 - 4.8 K/uL    Mono # 0.9 0.3 - 1.0 K/uL    Eos # 0.2 0.0 - 0.5 K/uL    Baso # 0.02 0.00 - 0.20 K/uL    nRBC 0 0 /100 WBC    Gran% 66.6 38.0 - 73.0 %    Lymph% 21.9 18.0 - 48.0 %    Mono% 8.8 4.0 - 15.0 %    Eosinophil% 2.1 0.0 - 8.0 %    Basophil% 0.2 0.0 - 1.9 %    Differential Method Automated    Comprehensive metabolic panel    Collection Time: 09/18/20  7:50 PM   Result Value Ref Range    Sodium 138 136 - 145 mmol/L    Potassium 3.6 3.5 - 5.1 mmol/L    Chloride 100 95 - 110 mmol/L    CO2 29 23 - 29 mmol/L    Glucose 94 70 - 110 mg/dL    BUN, Bld 19 6 - 20 mg/dL    Creatinine 0.8 0.5 - 1.4 mg/dL    Calcium 8.9 8.7 - 10.5 mg/dL    Total Protein 7.1 6.0 - 8.4 g/dL    Albumin 4.4 3.5 - 5.2 g/dL    Total Bilirubin 0.3 0.1 - 1.0 mg/dL    Alkaline Phosphatase 63 55 - 135 U/L    AST 16 10 - 40 U/L    ALT 13 10 - 44  U/L    Anion Gap 9 8 - 16 mmol/L    eGFR if African American >60.0 >60 mL/min/1.73 m^2    eGFR if non African American >60.0 >60 mL/min/1.73 m^2   Ethanol    Collection Time: 09/18/20  7:50 PM   Result Value Ref Range    Alcohol, Medical, Serum <10 <10 mg/dL   Acetaminophen level    Collection Time: 09/18/20  7:50 PM   Result Value Ref Range    Acetaminophen (Tylenol), Serum <3.0 (L) 10.0 - 20.0 ug/mL      Lab Results   Component Value Date    VALPROATE <12.5 (L) 03/14/2015     Imaging:  Imaging Results    None          ASSESSMENT     Ashwin Madrid is a 40 y.o. male with a past psychiatric history of bipolar 1 disorder, opiate use disorder (IV heroin, last used weeks ago, UDS negative), currently presenting with anxiety. Emergency Psychiatry was originally consulted to address the patient's symptoms of anxiety, depression, and substance abuse, question need for PEC in the setting of recent opiate overdose requiring intubation and extended hospital stay. He does not meet PEC criteria, however, would benefit from brief inpatient psychiatric stay to protect his sobriety prior to admission to rehabilitation program. Can sign an FVA for psychiatric admission.    IMPRESSION  Opioid Use Disorder, Severe  Nicotine Use Disorder  H/O Bipolar 1 Disorder    RECOMMENDATION(S)      1. Scheduled Medication(s):  Continue Discharge Med Regimen from 9/16/2020:  --Subutex 8mg BID. Do not give in combination with benzodiazepines.  --Risperdal 3mg BID  --Zoloft 150mg qd    2. PRN Medication(s):  For opiate withdrawal symptoms:  -Bentyl 10mg PO Q6H PRN for abdominal cramps  -Robaxin 1000mg PO Q8H PRN for muscle cramps  -Zofran 4mg PO Q6H PRN for nausea/vomiting  -Imodium 2mg PO QID PRN for diarrhea (max 16mg in 24 hours)  -Motrin 600mg PO PRN for pain     3. Legal Status/Precaution(s):  -Patient does not meet criteria for PEC at this time as he does not express SI/HI and is not gravely disabled secondary to a psychiatric diagnosis.  However, once medically cleared by ED MD, would recommend admission to inpatient psychiatric facility under FVA for treatment of anxiety and depression and in order to protect pt's sobriety in the setting of recent overdose requiring intubation and extended hospital stay.  -Recommend placement in inpatient drug rehabilitation program after inpatient psychiatric stay. He will require outpatient psychiatric services including a suboxone provider.  -Continue to observe patient's behavior while in the ER and reassess the patient daily until placement is found.    In cases of emergency, daily coverage provided by Acute/ED Psych MD, NP, or SW, with associated contact numbers listed in the [facility] On Call Schedule.    Case discussed with emergency psychiatry staff: MD Mala Cuellar,   [facility], PGY-2  [facility]

## 2020-09-19 NOTE — ED NOTES
Bed: [facility]  Expected date: 9/18/20  Expected time: 5:37 PM  Means of arrival:   Comments:

## 2022-05-10 ENCOUNTER — PATIENT MESSAGE (OUTPATIENT)
Dept: UROLOGY | Facility: CLINIC | Age: 42
End: 2022-05-10
Payer: MEDICARE

## 2022-05-16 RX ORDER — TAMSULOSIN HYDROCHLORIDE 0.4 MG/1
1 CAPSULE ORAL DAILY
COMMUNITY
Start: 2022-02-16 | End: 2023-03-21 | Stop reason: CLARIF

## 2022-05-16 RX ORDER — AMITRIPTYLINE HYDROCHLORIDE 100 MG/1
100 TABLET ORAL NIGHTLY
COMMUNITY
Start: 2022-05-02 | End: 2023-03-21 | Stop reason: CLARIF

## 2022-05-16 RX ORDER — CYCLOBENZAPRINE HCL 5 MG
5 TABLET ORAL 3 TIMES DAILY PRN
COMMUNITY
Start: 2021-11-20 | End: 2023-03-21 | Stop reason: CLARIF

## 2022-05-16 RX ORDER — OXCARBAZEPINE 300 MG/1
300 TABLET, FILM COATED ORAL 2 TIMES DAILY
COMMUNITY
Start: 2022-02-16 | End: 2023-03-21 | Stop reason: CLARIF

## 2022-05-16 RX ORDER — HYDROXYZINE HYDROCHLORIDE 50 MG/1
100 TABLET, FILM COATED ORAL 2 TIMES DAILY
COMMUNITY
Start: 2021-11-20 | End: 2023-03-21 | Stop reason: CLARIF

## 2022-05-16 RX ORDER — MIRTAZAPINE 15 MG/1
15 TABLET, FILM COATED ORAL NIGHTLY
COMMUNITY
Start: 2021-11-20

## 2022-05-16 RX ORDER — POLYETHYLENE GLYCOL 3350 17 G/17G
POWDER, FOR SOLUTION ORAL
COMMUNITY
Start: 2022-02-16 | End: 2023-03-21 | Stop reason: CLARIF

## 2022-05-16 RX ORDER — OLANZAPINE 5 MG/1
5 TABLET ORAL EVERY MORNING
COMMUNITY
Start: 2022-02-16 | End: 2023-03-21 | Stop reason: CLARIF

## 2022-05-16 RX ORDER — SULFAMETHOXAZOLE AND TRIMETHOPRIM 800; 160 MG/1; MG/1
1 TABLET ORAL 2 TIMES DAILY
COMMUNITY
Start: 2022-04-20 | End: 2022-11-14 | Stop reason: ALTCHOICE

## 2022-05-16 RX ORDER — DICYCLOMINE HYDROCHLORIDE 10 MG/1
10 CAPSULE ORAL 2 TIMES DAILY PRN
COMMUNITY
Start: 2022-04-14 | End: 2023-03-21 | Stop reason: CLARIF

## 2022-05-16 RX ORDER — ONDANSETRON HYDROCHLORIDE 8 MG/1
8 TABLET, FILM COATED ORAL 3 TIMES DAILY
COMMUNITY
Start: 2021-11-20 | End: 2023-03-21 | Stop reason: CLARIF

## 2022-05-16 RX ORDER — LORAZEPAM 1 MG/1
TABLET ORAL
COMMUNITY
Start: 2021-11-19 | End: 2023-03-21 | Stop reason: CLARIF

## 2022-05-16 RX ORDER — QUETIAPINE FUMARATE 100 MG/1
100 TABLET, FILM COATED ORAL NIGHTLY
Status: ON HOLD | COMMUNITY
Start: 2021-12-14 | End: 2023-03-29 | Stop reason: HOSPADM

## 2022-05-16 RX ORDER — OLANZAPINE 10 MG/1
10 TABLET ORAL NIGHTLY
COMMUNITY
Start: 2022-02-16 | End: 2023-03-21 | Stop reason: CLARIF

## 2022-05-16 RX ORDER — FERROUS SULFATE TAB 325 MG (65 MG ELEMENTAL FE) 325 (65 FE) MG
TAB ORAL DAILY
COMMUNITY
Start: 2022-02-16 | End: 2023-03-21 | Stop reason: CLARIF

## 2022-05-16 RX ORDER — PANTOPRAZOLE SODIUM 20 MG/1
20 TABLET, DELAYED RELEASE ORAL DAILY
COMMUNITY
Start: 2022-02-16 | End: 2023-03-21 | Stop reason: CLARIF

## 2022-05-16 RX ORDER — ONDANSETRON 4 MG/1
4 TABLET, FILM COATED ORAL EVERY 6 HOURS PRN
COMMUNITY
Start: 2022-04-08 | End: 2023-03-21 | Stop reason: CLARIF

## 2022-05-16 RX ORDER — DIVALPROEX SODIUM 250 MG/1
TABLET, DELAYED RELEASE ORAL
COMMUNITY
Start: 2021-12-14 | End: 2023-03-21 | Stop reason: CLARIF

## 2022-05-16 RX ORDER — PHENAZOPYRIDINE HYDROCHLORIDE 100 MG/1
100 TABLET, FILM COATED ORAL 3 TIMES DAILY
COMMUNITY
Start: 2022-04-20 | End: 2023-03-21 | Stop reason: CLARIF

## 2023-03-21 PROBLEM — A41.9 SEPSIS: Status: ACTIVE | Noted: 2023-03-21

## 2023-03-21 PROBLEM — I21.4 NSTEMI (NON-ST ELEVATED MYOCARDIAL INFARCTION): Status: ACTIVE | Noted: 2023-03-21

## 2023-03-30 PROBLEM — D72.829 LEUKOCYTOSIS: Status: ACTIVE | Noted: 2023-03-30

## 2023-03-30 PROBLEM — R46.89 BEHAVIORAL CHANGE: Status: ACTIVE | Noted: 2023-03-30

## 2023-03-30 PROBLEM — R41.82 ALTERED MENTAL STATUS: Status: ACTIVE | Noted: 2023-03-30

## 2023-03-31 PROBLEM — F19.10 POLYSUBSTANCE ABUSE: Status: RESOLVED | Noted: 2020-08-01 | Resolved: 2023-03-31

## 2023-07-03 PROBLEM — J96.01 ACUTE RESPIRATORY FAILURE WITH HYPOXEMIA: Status: RESOLVED | Noted: 2018-12-12 | Resolved: 2023-07-03

## 2023-07-03 PROBLEM — A41.9 SEPSIS: Status: RESOLVED | Noted: 2023-03-21 | Resolved: 2023-07-03

## 2023-07-03 PROBLEM — I21.4 NSTEMI (NON-ST ELEVATED MYOCARDIAL INFARCTION): Status: RESOLVED | Noted: 2023-03-21 | Resolved: 2023-07-03
